# Patient Record
Sex: FEMALE | Race: WHITE | NOT HISPANIC OR LATINO | Employment: FULL TIME | URBAN - METROPOLITAN AREA
[De-identification: names, ages, dates, MRNs, and addresses within clinical notes are randomized per-mention and may not be internally consistent; named-entity substitution may affect disease eponyms.]

---

## 2021-07-08 LAB
ABO + RH BLD: NORMAL
C TRACH RRNA SPEC QL PROBE: NEGATIVE
HBV SURFACE AG SERPL QL IA: NEGATIVE
HIV-1 AND HIV-2 ANTIBODIES: NEGATIVE
INDIRECT COOMBS: NEGATIVE
N GONORRHOEAE, AMPLIFIED DNA: NEGATIVE
RPR: NORMAL
RUBELLA IMMUNE STATUS: NORMAL

## 2021-12-29 ENCOUNTER — HOSPITAL ENCOUNTER (OUTPATIENT)
Facility: HOSPITAL | Age: 26
Discharge: HOME | End: 2021-12-29
Attending: SPECIALIST | Admitting: SPECIALIST
Payer: COMMERCIAL

## 2021-12-29 VITALS
OXYGEN SATURATION: 100 % | HEIGHT: 71 IN | WEIGHT: 293 LBS | BODY MASS INDEX: 41.02 KG/M2 | RESPIRATION RATE: 18 BRPM | DIASTOLIC BLOOD PRESSURE: 64 MMHG | TEMPERATURE: 98 F | SYSTOLIC BLOOD PRESSURE: 119 MMHG | HEART RATE: 115 BPM

## 2021-12-29 DIAGNOSIS — M54.50 LOWER BACK PAIN: ICD-10-CM

## 2021-12-29 LAB
ALBUMIN SERPL BCP-MCNC: 3.1 G/DL (ref 3.5–5.2)
ALP SERPL-CCNC: 79 U/L (ref 55–135)
ALT SERPL W/O P-5'-P-CCNC: 10 U/L (ref 10–44)
AMPHET+METHAMPHET UR QL: NEGATIVE
ANION GAP SERPL CALC-SCNC: 9 MMOL/L (ref 8–16)
AST SERPL-CCNC: 15 U/L (ref 10–40)
BACTERIA #/AREA URNS HPF: ABNORMAL /HPF
BARBITURATES UR QL SCN>200 NG/ML: NEGATIVE
BASOPHILS # BLD AUTO: 0.01 K/UL (ref 0–0.2)
BASOPHILS NFR BLD: 0.2 % (ref 0–1.9)
BENZODIAZ UR QL SCN>200 NG/ML: NEGATIVE
BILIRUB SERPL-MCNC: 0.8 MG/DL (ref 0.1–1)
BILIRUB UR QL STRIP: NEGATIVE
BUN SERPL-MCNC: <5 MG/DL (ref 6–20)
BUPRENORPHINE UR QL: NEGATIVE
BZE UR QL SCN: NEGATIVE
CALCIUM SERPL-MCNC: 8.7 MG/DL (ref 8.7–10.5)
CANNABINOIDS UR QL SCN: NEGATIVE
CHLORIDE SERPL-SCNC: 104 MMOL/L (ref 95–110)
CLARITY UR: CLEAR
CO2 SERPL-SCNC: 21 MMOL/L (ref 23–29)
COLOR UR: YELLOW
CREAT SERPL-MCNC: 0.7 MG/DL (ref 0.5–1.4)
CREAT UR-MCNC: 201 MG/DL (ref 15–325)
DIFFERENTIAL METHOD: ABNORMAL
EOSINOPHIL # BLD AUTO: 0 K/UL (ref 0–0.5)
EOSINOPHIL NFR BLD: 0.2 % (ref 0–8)
ERYTHROCYTE [DISTWIDTH] IN BLOOD BY AUTOMATED COUNT: 13.3 % (ref 11.5–14.5)
EST. GFR  (AFRICAN AMERICAN): >60 ML/MIN/1.73 M^2
EST. GFR  (NON AFRICAN AMERICAN): >60 ML/MIN/1.73 M^2
GLUCOSE SERPL-MCNC: 85 MG/DL (ref 70–110)
GLUCOSE UR QL STRIP: NEGATIVE
HCT VFR BLD AUTO: 30.9 % (ref 37–48.5)
HGB BLD-MCNC: 10.1 G/DL (ref 12–16)
HGB UR QL STRIP: NEGATIVE
HYALINE CASTS #/AREA URNS LPF: 13 /LPF
IMM GRANULOCYTES # BLD AUTO: 0.04 K/UL (ref 0–0.04)
IMM GRANULOCYTES NFR BLD AUTO: 0.8 % (ref 0–0.5)
KETONES UR QL STRIP: 60
LEUKOCYTE ESTERASE UR QL STRIP: ABNORMAL
LYMPHOCYTES # BLD AUTO: 0.4 K/UL (ref 1–4.8)
LYMPHOCYTES NFR BLD: 8.4 % (ref 18–48)
MCH RBC QN AUTO: 28 PG (ref 27–31)
MCHC RBC AUTO-ENTMCNC: 32.7 G/DL (ref 32–36)
MCV RBC AUTO: 86 FL (ref 82–98)
MICROSCOPIC COMMENT: ABNORMAL
MONOCYTES # BLD AUTO: 0.4 K/UL (ref 0.3–1)
MONOCYTES NFR BLD: 7.8 % (ref 4–15)
NEUTROPHILS # BLD AUTO: 4 K/UL (ref 1.8–7.7)
NEUTROPHILS NFR BLD: 82.6 % (ref 38–73)
NITRITE UR QL STRIP: NEGATIVE
NRBC BLD-RTO: 0 /100 WBC
OPIATES UR QL SCN: NEGATIVE
PCP UR QL SCN>25 NG/ML: NEGATIVE
PH UR STRIP: 7 [PH] (ref 5–8)
PLATELET # BLD AUTO: 150 K/UL (ref 150–450)
PMV BLD AUTO: 11 FL (ref 9.2–12.9)
POTASSIUM SERPL-SCNC: 3.7 MMOL/L (ref 3.5–5.1)
PROT SERPL-MCNC: 6.5 G/DL (ref 6–8.4)
PROT UR QL STRIP: ABNORMAL
RBC # BLD AUTO: 3.61 M/UL (ref 4–5.4)
RBC #/AREA URNS HPF: 1 /HPF (ref 0–4)
SODIUM SERPL-SCNC: 134 MMOL/L (ref 136–145)
SP GR UR STRIP: 1.02 (ref 1–1.03)
SQUAMOUS #/AREA URNS HPF: 7 /HPF
TOXICOLOGY INFORMATION: NORMAL
URN SPEC COLLECT METH UR: ABNORMAL
UROBILINOGEN UR STRIP-ACNC: NEGATIVE EU/DL
WBC # BLD AUTO: 4.88 K/UL (ref 3.9–12.7)
WBC #/AREA URNS HPF: 8 /HPF (ref 0–5)

## 2021-12-29 PROCEDURE — 80053 COMPREHEN METABOLIC PANEL: CPT | Performed by: SPECIALIST

## 2021-12-29 PROCEDURE — 80307 DRUG TEST PRSMV CHEM ANLYZR: CPT | Performed by: SPECIALIST

## 2021-12-29 PROCEDURE — 99211 OFF/OP EST MAY X REQ PHY/QHP: CPT | Mod: 25

## 2021-12-29 PROCEDURE — 25000003 PHARM REV CODE 250: Performed by: SPECIALIST

## 2021-12-29 PROCEDURE — 81001 URINALYSIS AUTO W/SCOPE: CPT | Performed by: SPECIALIST

## 2021-12-29 PROCEDURE — 81003 URINALYSIS AUTO W/O SCOPE: CPT | Mod: 59 | Performed by: SPECIALIST

## 2021-12-29 PROCEDURE — 63600175 PHARM REV CODE 636 W HCPCS: Performed by: SPECIALIST

## 2021-12-29 PROCEDURE — 85025 COMPLETE CBC W/AUTO DIFF WBC: CPT | Performed by: SPECIALIST

## 2021-12-29 RX ORDER — SODIUM CHLORIDE, SODIUM LACTATE, POTASSIUM CHLORIDE, CALCIUM CHLORIDE 600; 310; 30; 20 MG/100ML; MG/100ML; MG/100ML; MG/100ML
INJECTION, SOLUTION INTRAVENOUS CONTINUOUS
Status: DISCONTINUED | OUTPATIENT
Start: 2021-12-29 | End: 2021-12-29 | Stop reason: HOSPADM

## 2021-12-29 RX ORDER — BUTORPHANOL TARTRATE 2 MG/ML
1 INJECTION INTRAMUSCULAR; INTRAVENOUS ONCE
Status: COMPLETED | OUTPATIENT
Start: 2021-12-29 | End: 2021-12-29

## 2021-12-29 RX ADMIN — PROMETHAZINE HYDROCHLORIDE 12.5 MG: 25 INJECTION INTRAMUSCULAR; INTRAVENOUS at 07:12

## 2021-12-29 RX ADMIN — BUTORPHANOL TARTRATE 1 MG: 2 INJECTION, SOLUTION INTRAMUSCULAR; INTRAVENOUS at 07:12

## 2021-12-29 RX ADMIN — SODIUM CHLORIDE, SODIUM LACTATE, POTASSIUM CHLORIDE, AND CALCIUM CHLORIDE 1000 ML: .6; .31; .03; .02 INJECTION, SOLUTION INTRAVENOUS at 07:12

## 2021-12-30 NOTE — NURSING
[facility]  Department of Obstetrics and Gynecology  Labor & Delivery Triage Assessment    PATIENT NAME: Rina Mckenzie  MRN: [id number]  TODAY'S DATE: 2021    CHIEF COMPLAINT: Contractions      OB History    Para Term  AB Living   2 1 0 0 0 0   SAB IAB Ectopic Multiple Live Births   0 0 0 0 0      # Outcome Date GA Lbr Polo/2nd Weight Sex Delivery Anes PTL Lv   2 Current            1 Para              Past Medical History:   Diagnosis Date    PCOS (polycystic ovarian syndrome)      Past Surgical History:   Procedure Laterality Date    CHOLECYSTECTOMY      gastric sleeve           VITAL SIGNS - ABNORMAL VITALS INCLUDE TEMP >100.4,RR <12 or >26, SUSTAINED MATERNAL PULSE <60 or >120     VITAL SIGNS (Most Recent)  Temp: 97.7 °F (36.5 °C) (21)  Pulse: (!) 115 (21)  Resp: 18 (21)  BP: 119/64 (21)  SpO2: 100 % (21)    VITAL SIGNS     normal  HEADACHE    no     VOMITING    no  VISUAL DISTURBANCES  no  EPIGASTRIC PAIN        no  PROTEINURIA 2+ or MORE             pending   EDEMA FACE/EXTREMITIES            no    FETAL MOVEMENT     FETAL MOVEMENT: Active  FETAL HEART RATE BASELINE = 145   normal  FETAL HEART RATE VARIABILITY:  Moderate  FETAL HEART RATE ACCELERATIONS FOR GESTATIONAL AGE: present  FETAL HEART RATE DECELERATIONS: none    ABDOMINAL PAIN/CRAMPING/CONTRACTIONS     Patient is not complaining of abdominal pain/cramping/contractions.    RUPTURE OF MEMBRANES OR LEAKING OF AMNIOTIC FLUID     Patient denies ROM or leaking of amniotic fluid.    VAGINAL BLEEDING     Patient denies vaginal bleeding.    VAGINAL EXAM       VAGINAL EXAM DEFERRED DUE TO:  Not in Labor    PAIN PRESENT ON ARRIVAL     ONSET:  Yesterday with increasing intensity today  LOCATION:  back  PAIN SCALE (0-10):  8  DESCRIPTION: Aching constant lower back with intermittent sharp pains in vagina    EXACERBATION OF CHRONIC CONDITION (i.e. DM, ASTHMA, HTN)      n/a    PATIENT DISPOSITION     Dr. Espino notified of above findings at 1858. report given to AMY Erazo RN  [facility]  12/29/2021

## 2021-12-30 NOTE — NURSING
[facility]  Department of Obstetrics and Gynecology  Labor & Delivery Triage Assessment    PATIENT NAME: Rina Mckenzie  MRN: [id number]  TODAY'S DATE: 2021    CHIEF COMPLAINT: Back Pain      OB History    Para Term  AB Living   2 1 0 0 0 0   SAB IAB Ectopic Multiple Live Births   0 0 0 0 0      # Outcome Date GA Lbr Polo/2nd Weight Sex Delivery Anes PTL Lv   2 Current            1 Para              Past Medical History:   Diagnosis Date    PCOS (polycystic ovarian syndrome)      Past Surgical History:   Procedure Laterality Date    CHOLECYSTECTOMY      gastric sleeve           VITAL SIGNS - ABNORMAL VITALS INCLUDE TEMP >100.4,RR <12 or >26, SUSTAINED MATERNAL PULSE <60 or >120     VITAL SIGNS (Most Recent)  Temp: 97.7 °F (36.5 °C) (21)  Pulse: (!) 115 (21)  Resp: 18 (21)  BP: 119/64 (21)  SpO2: 100 % (21)    VITAL SIGNS     normal  HEADACHE    no     VOMITING    no  VISUAL DISTURBANCES  no  EPIGASTRIC PAIN        no  PROTEINURIA 2+ or MORE             no   EDEMA FACE/EXTREMITIES            no    FETAL MOVEMENT     FETAL MOVEMENT: Active  FETAL HEART RATE BASELINE =  130  normal  FETAL HEART RATE VARIABILITY:  Moderate  FETAL HEART RATE ACCELERATIONS FOR GESTATIONAL AGE: present  FETAL HEART RATE DECELERATIONS: NA    ABDOMINAL PAIN/CRAMPING/CONTRACTIONS     Patient is not complaining of abdominal pain/cramping/contractions.    RUPTURE OF MEMBRANES OR LEAKING OF AMNIOTIC FLUID     Patient denies ROM or leaking of amniotic fluid.    VAGINAL BLEEDING     Patient denies vaginal bleeding.    VAGINAL EXAM     DILATION:  NA  STATION:  NA  EFFACEMENT:  NA  PRESENTATION:  NA    VAGINAL EXAM DEFERRED DUE TO:  Not in Labor    PAIN PRESENT ON ARRIVAL     ONSET:   Yesterday   LOCATION:  Lower back  And abd  PAIN SCALE (0-10):  8 and then 3 after pain med  DESCRIPTION: Constant     EXACERBATION OF CHRONIC CONDITION (i.e. DM, ASTHMA, HTN)      NA    PATIENT DISPOSITION     Discharged Home      Dr. Espino notified at 2123 of the above assessment.    Angelica Salinas RN  [facility]  12/29/2021

## 2022-01-18 LAB — PRENATAL STREP B CULTURE: POSITIVE

## 2022-01-21 ENCOUNTER — HOSPITAL ENCOUNTER (OUTPATIENT)
Facility: HOSPITAL | Age: 27
Discharge: HOME | End: 2022-01-21
Attending: OBSTETRICS & GYNECOLOGY | Admitting: OBSTETRICS & GYNECOLOGY
Payer: COMMERCIAL

## 2022-01-21 VITALS
BODY MASS INDEX: 41.02 KG/M2 | HEART RATE: 124 BPM | WEIGHT: 293 LBS | SYSTOLIC BLOOD PRESSURE: 137 MMHG | TEMPERATURE: 98 F | OXYGEN SATURATION: 99 % | HEIGHT: 71 IN | RESPIRATION RATE: 17 BRPM | DIASTOLIC BLOOD PRESSURE: 80 MMHG

## 2022-01-21 LAB
CTP QC/QA: YES
RUPTURE OF MEMBRANE: NEGATIVE

## 2022-01-21 PROCEDURE — 63600175 PHARM REV CODE 636 W HCPCS: Performed by: OBSTETRICS & GYNECOLOGY

## 2022-01-21 PROCEDURE — 59412 ANTEPARTUM MANIPULATION: CPT

## 2022-01-21 PROCEDURE — 96372 THER/PROPH/DIAG INJ SC/IM: CPT

## 2022-01-21 RX ORDER — TERBUTALINE SULFATE 1 MG/ML
0.25 INJECTION SUBCUTANEOUS ONCE
Status: COMPLETED | OUTPATIENT
Start: 2022-01-21 | End: 2022-01-21

## 2022-01-21 RX ADMIN — TERBUTALINE SULFATE 0.25 MG: 1 INJECTION, SOLUTION SUBCUTANEOUS at 08:01

## 2022-01-21 NOTE — DISCHARGE INSTRUCTIONS
Keep your scheduled appointment with your provider.    Call your Doctor if you have any of the following:  Temperature above 100 degrees  Nausea, vomiting and/or diarrhea  Severe headache, dizziness, or blurred vision  Notable increase in swelling of hands or feet  Notable swelling of face and lips  Difficulty, pain or burning with urination  Foul smelling vaginal discharge  Decreased fetal movement  Vaginal bleeding  Abdominal pain  Leakage of fluid    Come to the hospital if you have any of the following symptoms:  Your water breaks  More than 4-6 contractions in 1 hour for 2 or more hours  Vaginal bleeding like a period    After 28 weeks, you should feel 10 distinct fetal movements within a 2 hour period.    It is recommended that you drink 1/2 a gallon of water each day.  Tea, Soda and Juice are  in addition to this.

## 2022-01-21 NOTE — OP NOTE
Procedure - External cephalic version  Pre-op diagnosis - IUP at 36 wga, footling breech presentation  Post-op diagnosis - same  Surgeon - Becca Eastman MD  Assistant - Maryse Marquis MD    Procedure in detail - bedside ultrasound performed and baby was and footling breech presentation with the head in the maternal right upper quadrant.  Placenta was right lateral.  The patient had a reactive strip and was given a dose of terbutaline subQ.  Pressure was applied to the fetal head and buttocks to attempt to roll the baby backward.  Two attempts were made without success.  We then attempted to roll the baby forward without success.  The procedure was aborted.  The patient tolerated well.  She will be monitored for 4 hours after.  We discussed planning a primary  for 39 weeks, but if the baby flips before then she can have of vaginal delivery.

## 2022-01-21 NOTE — NURSING
0800 -Pt arrived for external version @ 0730. EFM applied and Saline lock placed.    0830 - Version attempted by Bakari Eastman and Kandis and unsuccessful.    1245 - Discharged home - No contractions, 0/10 pain scale FHR reactive. Discharge instructions given, which included to return for abdominal pain, leakage of fluid, vaginal bleeding, decrease fetal movement plus routine discharge instructions. Verbalized understanding

## 2022-02-09 ENCOUNTER — ANESTHESIA EVENT (OUTPATIENT)
Dept: OBSTETRICS AND GYNECOLOGY | Facility: HOSPITAL | Age: 27
End: 2022-02-09
Payer: COMMERCIAL

## 2022-02-10 ENCOUNTER — ANESTHESIA (OUTPATIENT)
Dept: OBSTETRICS AND GYNECOLOGY | Facility: HOSPITAL | Age: 27
End: 2022-02-10
Payer: COMMERCIAL

## 2022-02-10 ENCOUNTER — HOSPITAL ENCOUNTER (INPATIENT)
Facility: HOSPITAL | Age: 27
LOS: 2 days | Discharge: HOME | End: 2022-02-12
Attending: OBSTETRICS & GYNECOLOGY | Admitting: OBSTETRICS & GYNECOLOGY
Payer: COMMERCIAL

## 2022-02-10 LAB
ABO GROUP BLD: NORMAL
AMPHET+METHAMPHET UR QL: NEGATIVE
BACTERIA #/AREA URNS HPF: ABNORMAL /HPF
BARBITURATES UR QL SCN>200 NG/ML: NEGATIVE
BASOPHILS # BLD AUTO: 0.01 K/UL (ref 0–0.2)
BASOPHILS NFR BLD: 0.2 % (ref 0–1.9)
BENZODIAZ UR QL SCN>200 NG/ML: NEGATIVE
BILIRUB UR QL STRIP: NEGATIVE
BLD GP AB SCN CELLS X3 SERPL QL: NORMAL
BUN SERPL-MCNC: 5 MG/DL (ref 6–20)
BUPRENORPHINE UR QL: NEGATIVE
BZE UR QL SCN: NEGATIVE
CANNABINOIDS UR QL SCN: NEGATIVE
CLARITY UR: ABNORMAL
COLOR UR: YELLOW
CREAT SERPL-MCNC: 0.6 MG/DL (ref 0.5–1.4)
CREAT UR-MCNC: 467 MG/DL (ref 15–325)
DIFFERENTIAL METHOD: ABNORMAL
EOSINOPHIL # BLD AUTO: 0 K/UL (ref 0–0.5)
EOSINOPHIL NFR BLD: 0.2 % (ref 0–8)
ERYTHROCYTE [DISTWIDTH] IN BLOOD BY AUTOMATED COUNT: 14.2 % (ref 11.5–14.5)
ERYTHROCYTE [DISTWIDTH] IN BLOOD BY AUTOMATED COUNT: 14.5 % (ref 11.5–14.5)
EST. GFR  (AFRICAN AMERICAN): >60 ML/MIN/1.73 M^2
EST. GFR  (NON AFRICAN AMERICAN): >60 ML/MIN/1.73 M^2
GLUCOSE UR QL STRIP: NEGATIVE
HCT VFR BLD AUTO: 28.1 % (ref 37–48.5)
HCT VFR BLD AUTO: 32.5 % (ref 37–48.5)
HGB BLD-MCNC: 10.7 G/DL (ref 12–16)
HGB BLD-MCNC: 8.8 G/DL (ref 12–16)
HGB UR QL STRIP: NEGATIVE
HYALINE CASTS #/AREA URNS LPF: 4 /LPF
IMM GRANULOCYTES # BLD AUTO: 0.02 K/UL (ref 0–0.04)
IMM GRANULOCYTES NFR BLD AUTO: 0.3 % (ref 0–0.5)
KETONES UR QL STRIP: ABNORMAL
LEUKOCYTE ESTERASE UR QL STRIP: ABNORMAL
LYMPHOCYTES # BLD AUTO: 1.4 K/UL (ref 1–4.8)
LYMPHOCYTES NFR BLD: 22.4 % (ref 18–48)
MCH RBC QN AUTO: 27.1 PG (ref 27–31)
MCH RBC QN AUTO: 27.3 PG (ref 27–31)
MCHC RBC AUTO-ENTMCNC: 31.3 G/DL (ref 32–36)
MCHC RBC AUTO-ENTMCNC: 32.9 G/DL (ref 32–36)
MCV RBC AUTO: 83 FL (ref 82–98)
MCV RBC AUTO: 87 FL (ref 82–98)
MICROSCOPIC COMMENT: ABNORMAL
MONOCYTES # BLD AUTO: 0.4 K/UL (ref 0.3–1)
MONOCYTES NFR BLD: 6.6 % (ref 4–15)
NEUTROPHILS # BLD AUTO: 4.5 K/UL (ref 1.8–7.7)
NEUTROPHILS NFR BLD: 70.3 % (ref 38–73)
NITRITE UR QL STRIP: NEGATIVE
NRBC BLD-RTO: 0 /100 WBC
OPIATES UR QL SCN: NEGATIVE
OTHER ELEMENTS URNS MICRO: ABNORMAL
PCP UR QL SCN>25 NG/ML: NEGATIVE
PH UR STRIP: 6 [PH] (ref 5–8)
PLATELET # BLD AUTO: 158 K/UL (ref 150–450)
PLATELET # BLD AUTO: 207 K/UL (ref 150–450)
PMV BLD AUTO: 10.7 FL (ref 9.2–12.9)
PMV BLD AUTO: 10.8 FL (ref 9.2–12.9)
PROT UR QL STRIP: ABNORMAL
RBC # BLD AUTO: 3.25 M/UL (ref 4–5.4)
RBC # BLD AUTO: 3.92 M/UL (ref 4–5.4)
RBC #/AREA URNS HPF: 1 /HPF (ref 0–4)
RH BLD: NORMAL
RPR SER QL: NORMAL
SARS-COV-2 RDRP RESP QL NAA+PROBE: NEGATIVE
SP GR UR STRIP: 1.02 (ref 1–1.03)
SQUAMOUS #/AREA URNS HPF: 50 /HPF
TOXICOLOGY INFORMATION: ABNORMAL
URN SPEC COLLECT METH UR: ABNORMAL
UROBILINOGEN UR STRIP-ACNC: ABNORMAL EU/DL
WBC # BLD AUTO: 6.34 K/UL (ref 3.9–12.7)
WBC # BLD AUTO: 7.03 K/UL (ref 3.9–12.7)
WBC #/AREA URNS HPF: 60 /HPF (ref 0–5)

## 2022-02-10 PROCEDURE — 80307 DRUG TEST PRSMV CHEM ANLYZR: CPT | Performed by: OBSTETRICS & GYNECOLOGY

## 2022-02-10 PROCEDURE — 84520 ASSAY OF UREA NITROGEN: CPT | Performed by: OBSTETRICS & GYNECOLOGY

## 2022-02-10 PROCEDURE — 63600175 PHARM REV CODE 636 W HCPCS: Performed by: OBSTETRICS & GYNECOLOGY

## 2022-02-10 PROCEDURE — 37000009 HC ANESTHESIA EA ADD 15 MINS: Performed by: OBSTETRICS & GYNECOLOGY

## 2022-02-10 PROCEDURE — U0002 COVID-19 LAB TEST NON-CDC: HCPCS | Performed by: OBSTETRICS & GYNECOLOGY

## 2022-02-10 PROCEDURE — 25000003 PHARM REV CODE 250: Performed by: OBSTETRICS & GYNECOLOGY

## 2022-02-10 PROCEDURE — 63600175 PHARM REV CODE 636 W HCPCS: Performed by: ANESTHESIOLOGY

## 2022-02-10 PROCEDURE — 63600175 PHARM REV CODE 636 W HCPCS: Performed by: NURSE ANESTHETIST, CERTIFIED REGISTERED

## 2022-02-10 PROCEDURE — 86850 RBC ANTIBODY SCREEN: CPT | Performed by: OBSTETRICS & GYNECOLOGY

## 2022-02-10 PROCEDURE — 12000002 HC ACUTE/MED SURGE SEMI-PRIVATE ROOM

## 2022-02-10 PROCEDURE — 86592 SYPHILIS TEST NON-TREP QUAL: CPT | Performed by: OBSTETRICS & GYNECOLOGY

## 2022-02-10 PROCEDURE — 25000003 PHARM REV CODE 250: Performed by: ANESTHESIOLOGY

## 2022-02-10 PROCEDURE — 71000039 HC RECOVERY, EACH ADD'L HOUR: Performed by: OBSTETRICS & GYNECOLOGY

## 2022-02-10 PROCEDURE — 37000008 HC ANESTHESIA 1ST 15 MINUTES: Performed by: OBSTETRICS & GYNECOLOGY

## 2022-02-10 PROCEDURE — 36415 COLL VENOUS BLD VENIPUNCTURE: CPT | Performed by: OBSTETRICS & GYNECOLOGY

## 2022-02-10 PROCEDURE — 85025 COMPLETE CBC W/AUTO DIFF WBC: CPT | Performed by: OBSTETRICS & GYNECOLOGY

## 2022-02-10 PROCEDURE — 85027 COMPLETE CBC AUTOMATED: CPT | Performed by: OBSTETRICS & GYNECOLOGY

## 2022-02-10 PROCEDURE — 71000033 HC RECOVERY, INTIAL HOUR: Performed by: OBSTETRICS & GYNECOLOGY

## 2022-02-10 PROCEDURE — 81001 URINALYSIS AUTO W/SCOPE: CPT | Performed by: OBSTETRICS & GYNECOLOGY

## 2022-02-10 PROCEDURE — 86901 BLOOD TYPING SEROLOGIC RH(D): CPT | Performed by: OBSTETRICS & GYNECOLOGY

## 2022-02-10 PROCEDURE — 86900 BLOOD TYPING SEROLOGIC ABO: CPT | Performed by: OBSTETRICS & GYNECOLOGY

## 2022-02-10 PROCEDURE — 87086 URINE CULTURE/COLONY COUNT: CPT | Performed by: OBSTETRICS & GYNECOLOGY

## 2022-02-10 PROCEDURE — 36000685 HC CESAREAN SECTION LEVEL I

## 2022-02-10 PROCEDURE — 82565 ASSAY OF CREATININE: CPT | Performed by: OBSTETRICS & GYNECOLOGY

## 2022-02-10 RX ORDER — ONDANSETRON 2 MG/ML
INJECTION INTRAMUSCULAR; INTRAVENOUS
Status: DISCONTINUED | OUTPATIENT
Start: 2022-02-10 | End: 2022-02-10

## 2022-02-10 RX ORDER — MUPIROCIN 20 MG/G
OINTMENT TOPICAL 2 TIMES DAILY
Status: DISCONTINUED | OUTPATIENT
Start: 2022-02-10 | End: 2022-02-12 | Stop reason: HOSPADM

## 2022-02-10 RX ORDER — PRENATAL WITH FERROUS FUM AND FOLIC ACID 3080; 920; 120; 400; 22; 1.84; 3; 20; 10; 1; 12; 200; 27; 25; 2 [IU]/1; [IU]/1; MG/1; [IU]/1; MG/1; MG/1; MG/1; MG/1; MG/1; MG/1; UG/1; MG/1; MG/1; MG/1; MG/1
1 TABLET ORAL DAILY
Status: DISCONTINUED | OUTPATIENT
Start: 2022-02-11 | End: 2022-02-12 | Stop reason: HOSPADM

## 2022-02-10 RX ORDER — BUPIVACAINE HYDROCHLORIDE 5 MG/ML
24 INJECTION, SOLUTION EPIDURAL; INTRACAUDAL
Status: DISCONTINUED | OUTPATIENT
Start: 2022-02-10 | End: 2022-02-12 | Stop reason: HOSPADM

## 2022-02-10 RX ORDER — OXYTOCIN-SODIUM CHLORIDE 0.9% IV SOLN 30 UNIT/500ML 30-0.9/5 UT/ML-%
30 SOLUTION INTRAVENOUS ONCE
Status: DISCONTINUED | OUTPATIENT
Start: 2022-02-10 | End: 2022-02-12 | Stop reason: HOSPADM

## 2022-02-10 RX ORDER — PHENYLEPHRINE HYDROCHLORIDE 10 MG/ML
INJECTION INTRAVENOUS
Status: DISCONTINUED | OUTPATIENT
Start: 2022-02-10 | End: 2022-02-10

## 2022-02-10 RX ORDER — OXYCODONE HYDROCHLORIDE 5 MG/1
5 TABLET ORAL EVERY 4 HOURS PRN
Status: DISCONTINUED | OUTPATIENT
Start: 2022-02-10 | End: 2022-02-11

## 2022-02-10 RX ORDER — ADHESIVE BANDAGE
30 BANDAGE TOPICAL 2 TIMES DAILY PRN
Status: DISCONTINUED | OUTPATIENT
Start: 2022-02-11 | End: 2022-02-12 | Stop reason: HOSPADM

## 2022-02-10 RX ORDER — SODIUM CHLORIDE, SODIUM LACTATE, POTASSIUM CHLORIDE, CALCIUM CHLORIDE 600; 310; 30; 20 MG/100ML; MG/100ML; MG/100ML; MG/100ML
INJECTION, SOLUTION INTRAVENOUS CONTINUOUS
Status: DISCONTINUED | OUTPATIENT
Start: 2022-02-10 | End: 2022-02-12 | Stop reason: HOSPADM

## 2022-02-10 RX ORDER — ACETAMINOPHEN 10 MG/ML
INJECTION, SOLUTION INTRAVENOUS
Status: DISCONTINUED | OUTPATIENT
Start: 2022-02-10 | End: 2022-02-10

## 2022-02-10 RX ORDER — SIMETHICONE 80 MG
1 TABLET,CHEWABLE ORAL EVERY 6 HOURS PRN
Status: DISCONTINUED | OUTPATIENT
Start: 2022-02-10 | End: 2022-02-12 | Stop reason: HOSPADM

## 2022-02-10 RX ORDER — BISACODYL 10 MG
10 SUPPOSITORY, RECTAL RECTAL ONCE AS NEEDED
Status: DISCONTINUED | OUTPATIENT
Start: 2022-02-10 | End: 2022-02-12 | Stop reason: HOSPADM

## 2022-02-10 RX ORDER — PROCHLORPERAZINE EDISYLATE 5 MG/ML
5 INJECTION INTRAMUSCULAR; INTRAVENOUS EVERY 6 HOURS PRN
Status: DISCONTINUED | OUTPATIENT
Start: 2022-02-10 | End: 2022-02-12 | Stop reason: HOSPADM

## 2022-02-10 RX ORDER — SODIUM CHLORIDE, SODIUM LACTATE, POTASSIUM CHLORIDE, CALCIUM CHLORIDE 600; 310; 30; 20 MG/100ML; MG/100ML; MG/100ML; MG/100ML
INJECTION, SOLUTION INTRAVENOUS CONTINUOUS PRN
Status: DISCONTINUED | OUTPATIENT
Start: 2022-02-10 | End: 2022-02-10

## 2022-02-10 RX ORDER — HYDROMORPHONE HYDROCHLORIDE 1 MG/ML
1 INJECTION, SOLUTION INTRAMUSCULAR; INTRAVENOUS; SUBCUTANEOUS
Status: DISCONTINUED | OUTPATIENT
Start: 2022-02-10 | End: 2022-02-12 | Stop reason: HOSPADM

## 2022-02-10 RX ORDER — DIPHENHYDRAMINE HYDROCHLORIDE 50 MG/ML
25 INJECTION INTRAMUSCULAR; INTRAVENOUS EVERY 4 HOURS PRN
Status: DISCONTINUED | OUTPATIENT
Start: 2022-02-10 | End: 2022-02-12 | Stop reason: HOSPADM

## 2022-02-10 RX ORDER — DIPHENHYDRAMINE HCL 25 MG
25 CAPSULE ORAL EVERY 4 HOURS PRN
Status: DISCONTINUED | OUTPATIENT
Start: 2022-02-10 | End: 2022-02-12 | Stop reason: HOSPADM

## 2022-02-10 RX ORDER — DOCUSATE SODIUM 100 MG/1
200 CAPSULE, LIQUID FILLED ORAL 2 TIMES DAILY
Status: DISCONTINUED | OUTPATIENT
Start: 2022-02-10 | End: 2022-02-12 | Stop reason: HOSPADM

## 2022-02-10 RX ORDER — OXYCODONE HYDROCHLORIDE 5 MG/1
10 TABLET ORAL EVERY 4 HOURS PRN
Status: DISCONTINUED | OUTPATIENT
Start: 2022-02-10 | End: 2022-02-11

## 2022-02-10 RX ORDER — FENTANYL CITRATE 50 UG/ML
INJECTION, SOLUTION INTRAMUSCULAR; INTRAVENOUS
Status: DISCONTINUED | OUTPATIENT
Start: 2022-02-10 | End: 2022-02-10

## 2022-02-10 RX ORDER — NALBUPHINE HYDROCHLORIDE 10 MG/ML
5 INJECTION, SOLUTION INTRAMUSCULAR; INTRAVENOUS; SUBCUTANEOUS EVERY 4 HOURS PRN
Status: DISCONTINUED | OUTPATIENT
Start: 2022-02-10 | End: 2022-02-12 | Stop reason: HOSPADM

## 2022-02-10 RX ORDER — KETOROLAC TROMETHAMINE 30 MG/ML
30 INJECTION, SOLUTION INTRAMUSCULAR; INTRAVENOUS EVERY 6 HOURS
Status: COMPLETED | OUTPATIENT
Start: 2022-02-11 | End: 2022-02-11

## 2022-02-10 RX ORDER — ONDANSETRON 2 MG/ML
4 INJECTION INTRAMUSCULAR; INTRAVENOUS EVERY 6 HOURS PRN
Status: ACTIVE | OUTPATIENT
Start: 2022-02-10 | End: 2022-02-12

## 2022-02-10 RX ORDER — HYDROMORPHONE HYDROCHLORIDE 1 MG/ML
2 INJECTION, SOLUTION INTRAMUSCULAR; INTRAVENOUS; SUBCUTANEOUS
Status: DISCONTINUED | OUTPATIENT
Start: 2022-02-10 | End: 2022-02-10

## 2022-02-10 RX ORDER — OXYTOCIN-SODIUM CHLORIDE 0.9% IV SOLN 30 UNIT/500ML 30-0.9/5 UT/ML-%
SOLUTION INTRAVENOUS CONTINUOUS PRN
Status: DISCONTINUED | OUTPATIENT
Start: 2022-02-10 | End: 2022-02-10

## 2022-02-10 RX ORDER — KETOROLAC TROMETHAMINE 30 MG/ML
30 INJECTION, SOLUTION INTRAMUSCULAR; INTRAVENOUS EVERY 6 HOURS
Status: DISCONTINUED | OUTPATIENT
Start: 2022-02-11 | End: 2022-02-10

## 2022-02-10 RX ORDER — HYDROCORTISONE 25 MG/G
CREAM TOPICAL 3 TIMES DAILY PRN
Status: DISCONTINUED | OUTPATIENT
Start: 2022-02-10 | End: 2022-02-12 | Stop reason: HOSPADM

## 2022-02-10 RX ORDER — MORPHINE SULFATE 0.5 MG/ML
INJECTION, SOLUTION EPIDURAL; INTRATHECAL; INTRAVENOUS
Status: DISCONTINUED | OUTPATIENT
Start: 2022-02-10 | End: 2022-02-10

## 2022-02-10 RX ORDER — PROCHLORPERAZINE EDISYLATE 5 MG/ML
5 INJECTION INTRAMUSCULAR; INTRAVENOUS EVERY 6 HOURS PRN
Status: DISCONTINUED | OUTPATIENT
Start: 2022-02-10 | End: 2022-02-10

## 2022-02-10 RX ORDER — KETOROLAC TROMETHAMINE 30 MG/ML
30 INJECTION, SOLUTION INTRAMUSCULAR; INTRAVENOUS ONCE
Status: COMPLETED | OUTPATIENT
Start: 2022-02-10 | End: 2022-02-10

## 2022-02-10 RX ADMIN — SODIUM CHLORIDE, SODIUM LACTATE, POTASSIUM CHLORIDE, AND CALCIUM CHLORIDE: .6; .31; .03; .02 INJECTION, SOLUTION INTRAVENOUS at 12:02

## 2022-02-10 RX ADMIN — KETOROLAC TROMETHAMINE 30 MG: 30 INJECTION, SOLUTION INTRAMUSCULAR at 06:02

## 2022-02-10 RX ADMIN — MUPIROCIN: 20 OINTMENT TOPICAL at 09:02

## 2022-02-10 RX ADMIN — MORPHINE SULFATE 2.5 MG: 0.5 INJECTION, SOLUTION EPIDURAL; INTRATHECAL; INTRAVENOUS at 12:02

## 2022-02-10 RX ADMIN — ONDANSETRON 4 MG: 2 INJECTION INTRAMUSCULAR; INTRAVENOUS at 04:02

## 2022-02-10 RX ADMIN — PROCHLORPERAZINE EDISYLATE 5 MG: 5 INJECTION INTRAMUSCULAR; INTRAVENOUS at 06:02

## 2022-02-10 RX ADMIN — PHENYLEPHRINE HYDROCHLORIDE 100 MCG: 10 INJECTION INTRAVENOUS at 12:02

## 2022-02-10 RX ADMIN — HYDROMORPHONE HYDROCHLORIDE 1 MG: 1 INJECTION, SOLUTION INTRAMUSCULAR; INTRAVENOUS; SUBCUTANEOUS at 06:02

## 2022-02-10 RX ADMIN — CEFAZOLIN SODIUM 3 ML: 2 SOLUTION INTRAVENOUS at 12:02

## 2022-02-10 RX ADMIN — DIPHENHYDRAMINE HYDROCHLORIDE 25 MG: 25 CAPSULE ORAL at 09:02

## 2022-02-10 RX ADMIN — ONDANSETRON 4 MG: 2 INJECTION INTRAMUSCULAR; INTRAVENOUS at 12:02

## 2022-02-10 RX ADMIN — SODIUM CHLORIDE, SODIUM LACTATE, POTASSIUM CHLORIDE, AND CALCIUM CHLORIDE 1000 ML: .6; .31; .03; .02 INJECTION, SOLUTION INTRAVENOUS at 10:02

## 2022-02-10 RX ADMIN — DOCUSATE SODIUM 200 MG: 100 CAPSULE, LIQUID FILLED ORAL at 09:02

## 2022-02-10 RX ADMIN — ACETAMINOPHEN 1000 MG: 10 INJECTION, SOLUTION INTRAVENOUS at 12:02

## 2022-02-10 RX ADMIN — FENTANYL CITRATE 100 MCG: 50 INJECTION INTRAMUSCULAR; INTRAVENOUS at 12:02

## 2022-02-10 RX ADMIN — HYDROMORPHONE HYDROCHLORIDE 2 MG: 1 INJECTION, SOLUTION INTRAMUSCULAR; INTRAVENOUS; SUBCUTANEOUS at 03:02

## 2022-02-10 RX ADMIN — OXYCODONE HYDROCHLORIDE 10 MG: 5 TABLET ORAL at 03:02

## 2022-02-10 RX ADMIN — FENTANYL CITRATE 100 MCG: 50 INJECTION INTRAMUSCULAR; INTRAVENOUS at 01:02

## 2022-02-10 RX ADMIN — Medication 3500 ML/HR: at 12:02

## 2022-02-10 NOTE — L&D DELIVERY NOTE
[facility]   Section   Operative Note    SUMMARY     Date of Procedure: 2/10/2022     Procedure: primary low transverse  section    Surgeon(s) and Role:     * Becca Eastman MD - Primary     * Yanira Flores MD - Assisting        Pre-Operative Diagnosis: IUP at 39 wga, kelsey breech presentation    Post-Operative Diagnosis: same    Anesthesia: spinal           Description of the Findings of the Procedure: VMI, kelsey breech presentation, 8#0oz, apgars 8/9.  Grossly normal uterus, fallopian tubes, and ovaries      Complications: No    QBL - 406ml     Procedure in detail - The risks, benefits, indication, and alternatives of the procedure were discussed with the patient and informed consent was obtained.  She was taken to the operating room where spinal anesthesia was found to be adequate.She was prepped and draped in normal sterile fashion in the supine position with a Sotelo catheter in place. A Pfannenstiel skin incision was made with a scalpel and carried through to the underlying layer fascia with the bovie. The fascia was incised in the midline and this incision was extended laterally using the curved Olguin scissors. The superior aspect of the fascial incision was grasped with Leslie clamps and underlying rectus muscles were dissected off sharply using curved Olguin scissors and bluntly. The inferior aspect of the fascial incision was treated in a similar manner. The rectus muscles were  in the midline and the peritoneum was identified and entered bluntly. The peritoneal incision was extended superiorly and inferiorly with good visualization of bladder. The bladder blade was inserted and vesicouterine peritoneum was incised in the midline. This incision was extended laterally and the bladder flap created digitally. The lower uterine segment was incised in the midline and this incision was extended laterally digitally also. The membranes were ruptured upon entry. The bladder  blade was removed and the infant's buttocks and trunk delivered, arms were swept medially and delivered easily.  Head delivered in flexed position without difficulty.   The cord was clamped and cut and the infant was handed off to waiting nursery staff. The placenta was delivered and the uterus was exteriorized and cleared of all clots and debris. The uterine incision was repaired with 0 PDS in a running locked fashion in two layers.  Several zero and 2- O chromic sutures were placed along the incision and excellent hemostasis was seen. The uterus was returned the pelvis and the pelvis was irrigated copiously with warm normal saline. The uterine incision and lower uterine segment were again checked for hemostasis and all instruments and sponges were removed from the abdomen and pelvis. The peritoneum was closed using 2-0 Vicryl in running fashion. The rectus muscles were checked for hemostasis. The fascia was closed using 0 PDS in a running fashion. The subcuticular fat was irrigated with normal saline and hemostasis was achieved using the Bovie. Willard's fascia was reapproximated using 2-0 Vicryl in a running fashion. A solution of exparel and marcaine was injected sub q per unit protocol.  The skin was closed using 4-0 Vicryl in a subcuticular fashion and a Mepilex dressing was placed over the incision. The patient tolerated procedure well. Sponge lap needle and instrument counts were correct ×2. She was taken recovery in stable condition.    Specimens:   Specimen (24h ago, onward)            None          Condition: Good    Disposition: L&D    Attestation: Good         Delivery Information for Roc Mckenzie    Birth information:  YOB: [birth year]22   Time of birth: 12:56 PM   Sex: male   Head Delivery Date/Time:     Delivery type:    Gestational Age: 39w2d    Delivery Providers    Delivering clinician:            Measurements    Weight:   Length:          Apgars    Living status:   Apgars:  1 min.:  5  min.:  10 min.:  15 min.:  20 min.:    Skin color:         Heart rate:         Reflex irritability:         Muscle tone:         Respiratory effort:         Total:                                Interventions/Resuscitation         Cord    No data filed       Placenta    Placenta delivery date/time:   Placenta removal:            Labor Events:       labor:       Labor Onset Date/Time:         Dilation Complete Date/Time:         Start Pushing Date/Time:         Start Pushing Date/Time:       Rupture Date/Time:            Rupture type:          Fluid Amount:       Fluid Color:                steroids:       Antibiotics given for GBS:       Induction:       Indications for induction:        Augmentation:       Indications for augmentation:       Labor complications:       Additional complications:          Cervical ripening:                     Delivery:      Episiotomy:       Indication for Episiotomy:       Perineal Lacerations:   Repaired:      Periurethral Laceration:   Repaired:     Labial Laceration:   Repaired:     Sulcus Laceration:   Repaired:     Vaginal Laceration:   Repaired:     Cervical Laceration:   Repaired:     Repair suture:       Repair # of packets:       Last Value - EBL - Nursing (mL):       Sum - EBL - Nursing (mL): 0     Last Value - EBL - Anesthesia (mL):      Calculated QBL (mL):       Vaginal Sweep Performed:       Surgicount Correct:         Other providers:            Details (if applicable):  Trial of Labor      Categorization:      Priority:     Indications for :     Incision Type:       Additional  information:  Forceps:    Vacuum:    Breech:    Observed anomalies    Other (Comments):

## 2022-02-10 NOTE — ANESTHESIA PREPROCEDURE EVALUATION
02/10/2022  Rina Mckenzie is a 26 y.o., female.    Anesthesia Evaluation    I have reviewed the Patient Summary Reports.    I have reviewed the Nursing Notes. I have reviewed the NPO Status.   I have reviewed the Medications.     Review of Systems  Anesthesia Hx:  Patient reports painful placement with 1st epidural.  Patient also reports slow to wake with general anesthesia. Denies Family Hx of Anesthesia complications.   Denies Personal Hx of Anesthesia complications.   Social:  Non-Smoker, No Alcohol Use    Hematology/Oncology:  Hematology Normal   Oncology Normal     EENT/Dental:   Lower permanent retainer   Cardiovascular:  Cardiovascular Normal  History of palpitations due to anxiety   Pulmonary:  Pulmonary Normal    Renal/:  Renal/ Normal     Hepatic/GI:   GERD History of vertical sleeve gastrectomy   Musculoskeletal:   Occasional low back pain and pelvic clicking with pregnancy.  Occasional TMJ popping.   Neurological:   Headaches (Occasional)    Endocrine:  Endocrine Normal    Psych:   anxiety depression          Physical Exam  General:  Morbid Obesity    Airway/Jaw/Neck:  Airway Findings: Mouth Opening: Normal Tongue: Normal  Mallampati: III  Improves to II with phonation.  TM Distance: Normal, at least 6 cm  Jaw/Neck Findings:  Neck ROM: Normal ROM      Dental:  Dental Findings: In tact   Chest/Lungs:  Chest/Lungs Findings: Clear to auscultation, Normal Respiratory Rate     Heart/Vascular:  Heart Findings: Rate: Normal  Rhythm: Regular Rhythm  Sounds: Normal  Heart murmur: negative       Mental Status:  Mental Status Findings:  Cooperative, Alert and Oriented         Anesthesia Plan  Type of Anesthesia, risks & benefits discussed:  Anesthesia Type:  CSE    Patient's Preference:   Plan Factors:          Intra-op Monitoring Plan: standard ASA monitors  Intra-op Monitoring Plan Comments:   Post  Op Pain Control Plan: multimodal analgesia and IV/PO Opioids PRN  Post Op Pain Control Plan Comments:     Induction:    Beta Blocker:         Informed Consent: Patient understands risks and agrees with Anesthesia plan.  Questions answered. Anesthesia consent signed with patient.  ASA Score: 3     Day of Surgery Review of History & Physical:        Anesthesia Plan Notes: Multimodal analgesia: CSE, Duramorph, Ofirmev        Ready For Surgery From Anesthesia Perspective.

## 2022-02-10 NOTE — TRANSFER OF CARE
"Anesthesia Transfer of Care Note    Patient: Rina Mckenzie    Procedure(s) Performed: Procedure(s) (LRB):   SECTION  (PRIMARY)   (BREECH) (N/A)    Patient location: Labor and Delivery    Anesthesia Type: CSE    Transport from OR: Transported from OR on room air with adequate spontaneous ventilation    Post pain: adequate analgesia    Post assessment: no apparent anesthetic complications    Post vital signs: stable    Level of consciousness: awake, alert and oriented    Nausea/Vomiting: no nausea/vomiting    Complications: none    Transfer of care protocol was followed      Last vitals:   Visit Vitals  /62 (BP Location: Right arm, Patient Position: Lying)   Pulse 73   Temp 36 °C (96.8 °F) (Axillary)   Resp 16   Ht 5' 11" (1.803 m)   Wt (!) 140.6 kg (310 lb)   SpO2 100%   Breastfeeding No   BMI 43.24 kg/m²     "

## 2022-02-10 NOTE — LACTATION NOTE
This note was copied from a baby's chart.  Mom reports baby is breastfeeding well & had just finished breastfeed baby. Breastfeeding basics reviewed. Discussed feeding cues & feeding frequency 8 or more times in 24 hours. Instructed to never to delay or limit feedings. Encouraged lots of skin to skin with baby. Assistance offered prn. Mom verbalized understanding

## 2022-02-11 PROCEDURE — 12000002 HC ACUTE/MED SURGE SEMI-PRIVATE ROOM

## 2022-02-11 PROCEDURE — 63600175 PHARM REV CODE 636 W HCPCS: Performed by: STUDENT IN AN ORGANIZED HEALTH CARE EDUCATION/TRAINING PROGRAM

## 2022-02-11 PROCEDURE — 25000003 PHARM REV CODE 250: Performed by: ANESTHESIOLOGY

## 2022-02-11 PROCEDURE — 25000003 PHARM REV CODE 250: Performed by: OBSTETRICS & GYNECOLOGY

## 2022-02-11 RX ORDER — OXYCODONE AND ACETAMINOPHEN 5; 325 MG/1; MG/1
1 TABLET ORAL EVERY 4 HOURS PRN
Status: DISCONTINUED | OUTPATIENT
Start: 2022-02-11 | End: 2022-02-12 | Stop reason: HOSPADM

## 2022-02-11 RX ORDER — OXYCODONE AND ACETAMINOPHEN 10; 325 MG/1; MG/1
1 TABLET ORAL EVERY 4 HOURS PRN
Status: DISCONTINUED | OUTPATIENT
Start: 2022-02-11 | End: 2022-02-12 | Stop reason: HOSPADM

## 2022-02-11 RX ADMIN — OXYCODONE HYDROCHLORIDE AND ACETAMINOPHEN 1 TABLET: 10; 325 TABLET ORAL at 01:02

## 2022-02-11 RX ADMIN — Medication: at 08:02

## 2022-02-11 RX ADMIN — MUPIROCIN: 20 OINTMENT TOPICAL at 08:02

## 2022-02-11 RX ADMIN — DIPHENHYDRAMINE HYDROCHLORIDE 25 MG: 25 CAPSULE ORAL at 01:02

## 2022-02-11 RX ADMIN — KETOROLAC TROMETHAMINE 30 MG: 30 INJECTION, SOLUTION INTRAMUSCULAR at 05:02

## 2022-02-11 RX ADMIN — OXYCODONE HYDROCHLORIDE 5 MG: 5 TABLET ORAL at 02:02

## 2022-02-11 RX ADMIN — MUPIROCIN: 20 OINTMENT TOPICAL at 09:02

## 2022-02-11 RX ADMIN — DOCUSATE SODIUM 200 MG: 100 CAPSULE, LIQUID FILLED ORAL at 09:02

## 2022-02-11 RX ADMIN — OXYCODONE HYDROCHLORIDE 5 MG: 5 TABLET ORAL at 05:02

## 2022-02-11 RX ADMIN — KETOROLAC TROMETHAMINE 30 MG: 30 INJECTION, SOLUTION INTRAMUSCULAR at 12:02

## 2022-02-11 RX ADMIN — OXYCODONE HYDROCHLORIDE AND ACETAMINOPHEN 1 TABLET: 10; 325 TABLET ORAL at 06:02

## 2022-02-11 RX ADMIN — DOCUSATE SODIUM 200 MG: 100 CAPSULE, LIQUID FILLED ORAL at 08:02

## 2022-02-11 RX ADMIN — OXYCODONE HYDROCHLORIDE AND ACETAMINOPHEN 1 TABLET: 10; 325 TABLET ORAL at 09:02

## 2022-02-11 RX ADMIN — OXYCODONE HYDROCHLORIDE 10 MG: 5 TABLET ORAL at 09:02

## 2022-02-11 RX ADMIN — PRENATAL VIT W/ FE FUMARATE-FA TAB 27-0.8 MG 1 TABLET: 27-0.8 TAB at 08:02

## 2022-02-11 NOTE — SUBJECTIVE & OBJECTIVE
Interval History: s/p primary c/section POD #1    She is doing well this morning. She is tolerating a regular diet without nausea or vomiting. She is voiding spontaneously. She is ambulating. She has passed flatus, and has not a BM. Vaginal bleeding is mild. She denies fever or chills. Abdominal pain is mild and controlled with oral medications. She Is breastfeeding. She desires circumcision for her male baby: yes.    Objective:     Vital Signs (Most Recent):  Temp: 98 °F (36.7 °C) (02/11/22 1110)  Pulse: 89 (02/11/22 1110)  Resp: 19 (02/11/22 1359)  BP: 135/78 (02/11/22 1110)  SpO2: 97 % (02/11/22 1110) Vital Signs (24h Range):  Temp:  [97.7 °F (36.5 °C)-98.5 °F (36.9 °C)] 98 °F (36.7 °C)  Pulse:  [66-94] 89  Resp:  [15-19] 19  SpO2:  [96 %-99 %] 97 %  BP: (106-136)/(58-85) 135/78     Weight: (!) 140.6 kg (310 lb)  Body mass index is 43.24 kg/m².      Intake/Output Summary (Last 24 hours) at 2/11/2022 1508  Last data filed at 2/11/2022 1359  Gross per 24 hour   Intake --   Output 3256 ml   Net -3256 ml         Significant Labs:  Lab Results   Component Value Date    GROUPTRH AB POS 07/08/2021    HEPBSAG Negative 07/08/2021    STREPBCULT positive 01/18/2022     Recent Labs   Lab 02/10/22  1946   HGB 8.8*   HCT 28.1*       I have personallly reviewed all pertinent lab results from the last 24 hours.    Physical Exam   Gen - NAD  Uterus - firm below umbilicus, non tender  Ext - trace edema bilateral lower extremity, no calf tenderness  Incision - mepilex in place, dry

## 2022-02-11 NOTE — PLAN OF CARE
Rina is progressing well. V.S. WNL. Pain controlled with PO medication. Ambulates around room and hallway with ease. Fundus firm, lochia light. Denies needs or concerns.

## 2022-02-11 NOTE — ANESTHESIA POSTPROCEDURE EVALUATION
Anesthesia Post Evaluation    Patient: Rina Mckenzie    Procedure(s) Performed: Procedure(s) (LRB):   SECTION  (PRIMARY)   (BREECH) (N/A)    Final Anesthesia Type: CSE      Patient location during evaluation: labor & delivery  Patient participation: Yes- Able to Participate  Level of consciousness: awake and alert and oriented  Post-procedure vital signs: reviewed and stable  Pain management: adequate  Airway patency: patent    PONV status at discharge: No PONV  Anesthetic complications: no      Cardiovascular status: blood pressure returned to baseline and hemodynamically stable  Respiratory status: spontaneous ventilation, unassisted and room air  Hydration status: euvolemic  Follow-up not needed.  Comments: Patient reports full return of motor and sensory to her lower extremities. No headache or backache. Ambulating without difficulty. No apparent anesthetic complications.          Vitals Value Taken Time   /78 22 0834   Temp 36.5 °C (97.7 °F) 22 0834   Pulse 79 22 0834   Resp 17 22 0955   SpO2 99 % 22 0834         No case tracking events are documented in the log.      Pain/Shlomo Score: Pain Rating Prior to Med Admin: 7 (2022  9:55 AM)  Pain Rating Post Med Admin: 3 (2/10/2022  6:45 PM)

## 2022-02-11 NOTE — PROGRESS NOTES
[facility]  Obstetrics  Postpartum Progress Note    Patient Name: Rina Mckenzie  MRN: [id number]  Admission Date: 2/10/2022  Hospital Length of Stay: 1 days  Attending Physician: Becca Eastman MD  Primary Care Provider: Primary Doctor No    Subjective:     Principal Problem:Breech presentation    Hospital Course:  No notes on file    Interval History: s/p primary c/section POD #1    She is doing well this morning. She is tolerating a regular diet without nausea or vomiting. She is voiding spontaneously. She is ambulating. She has passed flatus, and has not a BM. Vaginal bleeding is mild. She denies fever or chills. Abdominal pain is mild and controlled with oral medications. She Is breastfeeding. She desires circumcision for her male baby: yes.    Objective:     Vital Signs (Most Recent):  Temp: 98 °F (36.7 °C) (22 1110)  Pulse: 89 (22 1110)  Resp: 19 (22 1359)  BP: 135/78 (22 1110)  SpO2: 97 % (22 1110) Vital Signs (24h Range):  Temp:  [97.7 °F (36.5 °C)-98.5 °F (36.9 °C)] 98 °F (36.7 °C)  Pulse:  [66-94] 89  Resp:  [15-19] 19  SpO2:  [96 %-99 %] 97 %  BP: (106-136)/(58-85) 135/78     Weight: (!) 140.6 kg (310 lb)  Body mass index is 43.24 kg/m².      Intake/Output Summary (Last 24 hours) at 2022 1508  Last data filed at 2022 1359  Gross per 24 hour   Intake --   Output 3256 ml   Net -3256 ml         Significant Labs:  Lab Results   Component Value Date    GROUPTRH AB POS 2021    HEPBSAG Negative 2021    STREPBCULT positive 2022     Recent Labs   Lab 02/10/22  1946   HGB 8.8*   HCT 28.1*       I have personallly reviewed all pertinent lab results from the last 24 hours.    Physical Exam   Gen - NAD  Uterus - firm below umbilicus, non tender  Ext - trace edema bilateral lower extremity, no calf tenderness  Incision - mepilex in place, dry    Assessment/Plan:     26 y.o. female  for:    * Breech presentation  S/p primary c/section  POD #1, doing well    Continue post op care        .    Becca Eastman MD  Obstetrics  [facility]

## 2022-02-11 NOTE — NURSING
Dr. Maddox contacted by RN to request Toradol order to replace scheduled Ibuprofen order. Patient states that she has had the gastric sleeve procedure and can't take oral Ibuprofen. Order received for Toradol 30mg x2 doses (0000, 0600).

## 2022-02-11 NOTE — LACTATION NOTE
02/11/22 1010   Maternal Assessment   Breast Density Bilateral:;soft   Areola Bilateral:;elastic   Nipples Bilateral:;everted   Maternal Infant Feeding   Maternal Emotional State independent   Infant Positioning cradle   Signs of Milk Transfer audible swallow   Pain with Feeding no   Latch Assistance no     Patient breastfeeding baby on right breast in cradle position. Baby latched deeply, nursing well with audible swallows. Mother denies pain during feeding. Reviewed basic breastfeeding instructions and encouraged to call me for any further breastfeeding assistance. Patient verbalizes understanding of all instructions with good recall.

## 2022-02-11 NOTE — PROGRESS NOTES
OB Screen completed and no needs identified at this time.  White board in room updated with contact information, and mother was encouraged to contact office if further needs arise.     22 1040   OB SCREEN   Assessment Type Discharge Planning Assessment   Source of Information patient;health record   Received Prenatal Care Yes   Any indications/suspicions for None   Is this a teen pregnancy No   Is the baby in NICU No   Indication for adoption/Safe Haven No   Indication for DME/post-acute needs No   HIV (+) No   Any congenital  disorders No   Fetal demise/ death No

## 2022-02-12 VITALS
DIASTOLIC BLOOD PRESSURE: 84 MMHG | OXYGEN SATURATION: 98 % | TEMPERATURE: 99 F | HEART RATE: 80 BPM | HEIGHT: 71 IN | RESPIRATION RATE: 20 BRPM | BODY MASS INDEX: 41.02 KG/M2 | SYSTOLIC BLOOD PRESSURE: 125 MMHG | WEIGHT: 293 LBS

## 2022-02-12 LAB
BACTERIA UR CULT: NO GROWTH
BASOPHILS # BLD AUTO: 0.01 K/UL (ref 0–0.2)
BASOPHILS NFR BLD: 0.2 % (ref 0–1.9)
DIFFERENTIAL METHOD: ABNORMAL
EOSINOPHIL # BLD AUTO: 0.1 K/UL (ref 0–0.5)
EOSINOPHIL NFR BLD: 1 % (ref 0–8)
ERYTHROCYTE [DISTWIDTH] IN BLOOD BY AUTOMATED COUNT: 14.6 % (ref 11.5–14.5)
HCT VFR BLD AUTO: 28.4 % (ref 37–48.5)
HGB BLD-MCNC: 8.9 G/DL (ref 12–16)
IMM GRANULOCYTES # BLD AUTO: 0.02 K/UL (ref 0–0.04)
IMM GRANULOCYTES NFR BLD AUTO: 0.3 % (ref 0–0.5)
LYMPHOCYTES # BLD AUTO: 1.3 K/UL (ref 1–4.8)
LYMPHOCYTES NFR BLD: 22.8 % (ref 18–48)
MCH RBC QN AUTO: 27.1 PG (ref 27–31)
MCHC RBC AUTO-ENTMCNC: 31.3 G/DL (ref 32–36)
MCV RBC AUTO: 86 FL (ref 82–98)
MONOCYTES # BLD AUTO: 0.4 K/UL (ref 0.3–1)
MONOCYTES NFR BLD: 7.1 % (ref 4–15)
NEUTROPHILS # BLD AUTO: 4 K/UL (ref 1.8–7.7)
NEUTROPHILS NFR BLD: 68.6 % (ref 38–73)
NRBC BLD-RTO: 0 /100 WBC
PLATELET # BLD AUTO: 180 K/UL (ref 150–450)
PMV BLD AUTO: 10.8 FL (ref 9.2–12.9)
RBC # BLD AUTO: 3.29 M/UL (ref 4–5.4)
WBC # BLD AUTO: 5.78 K/UL (ref 3.9–12.7)

## 2022-02-12 PROCEDURE — 25000003 PHARM REV CODE 250: Performed by: OBSTETRICS & GYNECOLOGY

## 2022-02-12 PROCEDURE — 36415 COLL VENOUS BLD VENIPUNCTURE: CPT | Performed by: OBSTETRICS & GYNECOLOGY

## 2022-02-12 PROCEDURE — 85025 COMPLETE CBC W/AUTO DIFF WBC: CPT | Performed by: OBSTETRICS & GYNECOLOGY

## 2022-02-12 RX ORDER — OXYCODONE AND ACETAMINOPHEN 5; 325 MG/1; MG/1
1-2 TABLET ORAL EVERY 4 HOURS PRN
Qty: 30 TABLET | Refills: 0 | Status: SHIPPED | OUTPATIENT
Start: 2022-02-12 | End: 2022-11-03

## 2022-02-12 RX ADMIN — OXYCODONE HYDROCHLORIDE AND ACETAMINOPHEN 1 TABLET: 10; 325 TABLET ORAL at 03:02

## 2022-02-12 RX ADMIN — OXYCODONE HYDROCHLORIDE AND ACETAMINOPHEN 1 TABLET: 10; 325 TABLET ORAL at 08:02

## 2022-02-12 RX ADMIN — PRENATAL VIT W/ FE FUMARATE-FA TAB 27-0.8 MG 1 TABLET: 27-0.8 TAB at 08:02

## 2022-02-12 RX ADMIN — DOCUSATE SODIUM 200 MG: 100 CAPSULE, LIQUID FILLED ORAL at 08:02

## 2022-02-12 RX ADMIN — OXYCODONE HYDROCHLORIDE AND ACETAMINOPHEN 1 TABLET: 10; 325 TABLET ORAL at 12:02

## 2022-02-12 NOTE — DISCHARGE INSTRUCTIONS
Pelvic rest for 6 weeks (no sex, tampons, douching, nothing in the vagina)    You can experience vaginal bleeding on and off for up to 6 weeks, it will gradually get lighter and the color will change from bright red to a brownish discharge towards the end.    Activity:  NO strenuous activities or exercising for 6 weeks.  Do not /lift anything over 15 pounds and no heavy housework or cleaning for 6 weeks.  Limit stair climbing to twice a day during the first 2 weeks.   NO driving for 4 weeks.  You may take short car trips but do not drive.    You may shower ONLY for the first 2 weeks, after 2 weeks you can soak in a bathtub.  Use a mild soap, no heavy perfumes or fragrances to avoid irritation.     Walking frequently following a  delivery promotes healing and decreases pain associated with gas.   If constipation develops:  You may take Colace (stool softener), Milk of Magnesia, Dulcolax or Miralax.  All of these medications are sold over the counter.      Incision Care:   Clean your incision with mild soap & warm water only- do not scrub- let warm water run over it, then pat dry.      Pain Relief:  You may take Motrin for mild pain & uterine cramping.      Emotional Changes:  You may experience baby blues after delivery.  You may feel let down, anxious and cry easily.  This is normal.  These feelings can begin 2-3 days after delivery and usually disappear in about a week or two.  Prolonged sadness may indicate postpartum depression.      Call your doctor for any of the following:  Difficulty breathing, problems with any of your medications, inability to eat.    Foul smelling vaginal discharge.  If you notice pus-like drainage from your incision, if your incision or the area around it becomes hot or swollen, or if you notice a foul smelling odor.  Temperature above 100.4.    Heavy vaginal bleeding.  All women bleed different after delivery and each delivery is different.  Heavy bleeding consists of  saturating a shamir pad in a 1 hour time period.  Passing clots are normal, if you pass a blood clot larger than the size of a golf ball call your doctor's office.   If you experience pain in your legs/calves, if one leg increases in size and becomes swollen or becomes hot to touch or discolored.   Crying or periods of sadness beyond 2 weeks.      If you are breast feeding:  Wash your breasts with mild soap and warm water.  You should wear a supportive bra.  You should continue to take a prenatal vitamin for 6 weeks or until breastfeeding is discontinued.  If nipples are sore, apply a few drops of breast milk after nursing and let air dry or you can use Lanolin cream.   If breasts are engorged, apply warmth and express milk.    If you are not breastfeeding:  Wear a tight bra and do not stimulate your breasts.  Avoid handling your breasts and do not express milk.  You may apply ice packs or cabbage leaves to relieve discomfort from engorgement.  If your breasts become warm to touch, reddened or lumps develop call your doctor.

## 2022-02-12 NOTE — DISCHARGE SUMMARY
[facility]  Discharge Summary  Obstetrics - Triage      Admit Date: 2/10/2022    Discharge Date and Time:  2022 11:22 AM    Attending Physician: Becca Eastman MD     Discharge Provider: Becca Eastman    Reason for Admission: IUP at 39 wga, breech presentation    Hospital Course (synopsis of major diagnoses, care, treatment, and services provided during the course of the hospital stay):     Rina Mckenzie is a 26 y.o.  female who presented to labor and delivery for primary  at 39 weeks secondary to breech presentation.  She had a  section without complication and her postop course was unremarkable.  On postop day 2. She was tolerating a regular diet, passing gas, her pain was well controlled with oral medication, her vaginal bleeding was minimal, and she requested discharge home.  Throughout her stay her vital signs are stable and she was afebrile.  She will be discharged home with discharge instructions, a prescription for pain medicine, and she will follow up with me next week.  We discussed the use of ibuprofen since she has a history of a gastric sleeve.  The patient was uncertain whether she can take it or not.  We discussed taking it with caution with food and no more than 400 mg at a time.    Gen - NAD  Uterus - firm below umbilicus, non tender  Ext - trace edema bilateral lower extremity, no calf tenderness  Incision - mepilex in place, dry.    She was admitted to the labor and delivery triage area. Vital signs on admit were: Temp: 98.1 °F (36.7 °C), Pulse: 98, Resp: 17, BP: 118/77, SpO2: 97 %.      Final Diagnoses:    Principal Problem: Breech presentation   Secondary Diagnoses: none    Discharged Condition: good    Disposition: Home or Self Care    Follow Up/Patient Instructions:     Medications:  Reconciled Home Medications:      Medication List      START taking these medications    oxyCODONE-acetaminophen 5-325 mg per tablet  Commonly known as:  PERCOCET  Take 1-2 tablets by mouth every 4 (four) hours as needed for Pain.        CONTINUE taking these medications    PRENATAL 1+1 ORAL  Take by mouth.          Discharge Procedure Orders   Diet Adult Regular     Lifting restrictions   Scheduling Instructions: No lifting greater than 10 # for 6 weeks     No driving until:   Order Comments: No driving for 2 weeks      Follow-up Information     Becca Eastman MD. Schedule an appointment as soon as possible for a visit in 1 week.    Specialties: Obstetrics, Obstetrics and Gynecology  Why: For follow up  Contact information:  [address]  [address]  [facility]  [place] [address]  [phone]

## 2022-02-28 ENCOUNTER — OFFICE VISIT (OUTPATIENT)
Dept: URGENT CARE | Facility: CLINIC | Age: 27
End: 2022-02-28
Payer: MEDICAID

## 2022-02-28 VITALS
RESPIRATION RATE: 16 BRPM | OXYGEN SATURATION: 99 % | HEIGHT: 71 IN | DIASTOLIC BLOOD PRESSURE: 85 MMHG | HEART RATE: 82 BPM | TEMPERATURE: 97 F | WEIGHT: 283 LBS | BODY MASS INDEX: 39.62 KG/M2 | SYSTOLIC BLOOD PRESSURE: 137 MMHG

## 2022-02-28 DIAGNOSIS — B37.2 CANDIDIASIS OF SKIN: Primary | ICD-10-CM

## 2022-02-28 PROCEDURE — 1159F PR MEDICATION LIST DOCUMENTED IN MEDICAL RECORD: ICD-10-PCS | Mod: CPTII,S$GLB,, | Performed by: NURSE PRACTITIONER

## 2022-02-28 PROCEDURE — 3075F SYST BP GE 130 - 139MM HG: CPT | Mod: CPTII,S$GLB,, | Performed by: NURSE PRACTITIONER

## 2022-02-28 PROCEDURE — 3079F DIAST BP 80-89 MM HG: CPT | Mod: CPTII,S$GLB,, | Performed by: NURSE PRACTITIONER

## 2022-02-28 PROCEDURE — 3075F PR MOST RECENT SYSTOLIC BLOOD PRESS GE 130-139MM HG: ICD-10-PCS | Mod: CPTII,S$GLB,, | Performed by: NURSE PRACTITIONER

## 2022-02-28 PROCEDURE — 99203 OFFICE O/P NEW LOW 30 MIN: CPT | Mod: S$GLB,,, | Performed by: NURSE PRACTITIONER

## 2022-02-28 PROCEDURE — 1159F MED LIST DOCD IN RCRD: CPT | Mod: CPTII,S$GLB,, | Performed by: NURSE PRACTITIONER

## 2022-02-28 PROCEDURE — 1160F RVW MEDS BY RX/DR IN RCRD: CPT | Mod: CPTII,S$GLB,, | Performed by: NURSE PRACTITIONER

## 2022-02-28 PROCEDURE — 99203 PR OFFICE/OUTPT VISIT, NEW, LEVL III, 30-44 MIN: ICD-10-PCS | Mod: S$GLB,,, | Performed by: NURSE PRACTITIONER

## 2022-02-28 PROCEDURE — 3079F PR MOST RECENT DIASTOLIC BLOOD PRESSURE 80-89 MM HG: ICD-10-PCS | Mod: CPTII,S$GLB,, | Performed by: NURSE PRACTITIONER

## 2022-02-28 PROCEDURE — 1160F PR REVIEW ALL MEDS BY PRESCRIBER/CLIN PHARMACIST DOCUMENTED: ICD-10-PCS | Mod: CPTII,S$GLB,, | Performed by: NURSE PRACTITIONER

## 2022-02-28 PROCEDURE — 3008F PR BODY MASS INDEX (BMI) DOCUMENTED: ICD-10-PCS | Mod: CPTII,S$GLB,, | Performed by: NURSE PRACTITIONER

## 2022-02-28 PROCEDURE — 3008F BODY MASS INDEX DOCD: CPT | Mod: CPTII,S$GLB,, | Performed by: NURSE PRACTITIONER

## 2022-02-28 RX ORDER — FLUCONAZOLE 150 MG/1
150 TABLET ORAL EVERY OTHER DAY
Qty: 5 TABLET | Refills: 0 | Status: SHIPPED | OUTPATIENT
Start: 2022-02-28 | End: 2022-03-09

## 2022-02-28 RX ORDER — NYSTATIN 100000 [USP'U]/G
POWDER TOPICAL 2 TIMES DAILY
Qty: 60 G | Refills: 0 | Status: SHIPPED | OUTPATIENT
Start: 2022-02-28 | End: 2023-02-08

## 2022-03-01 NOTE — PATIENT INSTRUCTIONS
Nystatin powder to affected area twice daily  Keep area clean and dry  Fluconazole 150mg every other day x 5  Follow up if symptoms persist or worsen

## 2022-03-01 NOTE — PROGRESS NOTES
"Subjective:       Patient ID: Rina Mckenzie is a 26 y.o. female.    Vitals:  height is 5' 11" (1.803 m) and weight is 128.4 kg (283 lb). Her oral temperature is 97 °F (36.1 °C). Her blood pressure is 137/85 and her pulse is 82. Her respiration is 16 and oxygen saturation is 99%.     Chief Complaint: Wound Infection    Pt had a  on 02/10/2022.  States she believes her incision site is infected.   Incision site with redness and foul odor.  OB is Dr Becca Eastman. She is breastfeeding.       Constitution: Negative for fever.   Skin: Positive for rash.       Objective:      Physical Exam   Constitutional: She is oriented to person, place, and time. She appears well-developed.   HENT:   Head: Normocephalic and atraumatic.   Mouth/Throat: Oropharynx is clear and moist.   Eyes: Conjunctivae and EOM are normal. Pupils are equal, round, and reactive to light.   Neck: Neck supple.   Cardiovascular: Normal rate, regular rhythm and normal heart sounds.   Pulmonary/Chest: Effort normal and breath sounds normal.   Musculoskeletal: Normal range of motion.         General: Normal range of motion.   Neurological: She is alert and oriented to person, place, and time.   Skin: Skin is warm, dry, rash and papular (Lower abdomen with papular red rash). Capillary refill takes 2 to 3 seconds.         Comments:  incision well approximated and healing well. No drainage noted from incision.   Psychiatric: Her behavior is normal.   Nursing note and vitals reviewed.        Assessment:       1. Candidiasis of skin          Plan:         Candidiasis of skin    Other orders  -     fluconazole (DIFLUCAN) 150 MG Tab; Take 1 tablet (150 mg total) by mouth every other day. for 5 doses  Dispense: 5 tablet; Refill: 0  -     nystatin (MYCOSTATIN) powder; Apply topically 2 (two) times daily.  Dispense: 60 g; Refill: 0        Nystatin powder to affected area twice daily  Keep area clean and dry  Fluconazole 150mg every other day x " 5  Follow up with Dr Eastman  Follow up if symptoms persist or worsen

## 2022-03-03 ENCOUNTER — PATIENT MESSAGE (OUTPATIENT)
Dept: URGENT CARE | Facility: CLINIC | Age: 27
End: 2022-03-03
Payer: MEDICAID

## 2022-11-02 ENCOUNTER — HOSPITAL ENCOUNTER (EMERGENCY)
Facility: HOSPITAL | Age: 27
Discharge: HOME | End: 2022-11-02
Attending: EMERGENCY MEDICINE
Payer: COMMERCIAL

## 2022-11-02 VITALS
RESPIRATION RATE: 16 BRPM | TEMPERATURE: 98 F | HEART RATE: 74 BPM | DIASTOLIC BLOOD PRESSURE: 55 MMHG | OXYGEN SATURATION: 98 % | BODY MASS INDEX: 39.2 KG/M2 | HEIGHT: 71 IN | WEIGHT: 280 LBS | SYSTOLIC BLOOD PRESSURE: 104 MMHG

## 2022-11-02 DIAGNOSIS — N83.209 HEMORRHAGIC OVARIAN CYST: Primary | ICD-10-CM

## 2022-11-02 LAB
ALBUMIN SERPL BCP-MCNC: 4 G/DL (ref 3.5–5.2)
ALP SERPL-CCNC: 69 U/L (ref 55–135)
ALT SERPL W/O P-5'-P-CCNC: 16 U/L (ref 10–44)
ANION GAP SERPL CALC-SCNC: 8 MMOL/L (ref 8–16)
AST SERPL-CCNC: 13 U/L (ref 10–40)
B-HCG UR QL: NEGATIVE
BACTERIA #/AREA URNS HPF: NEGATIVE /HPF
BASOPHILS # BLD AUTO: 0.01 K/UL (ref 0–0.2)
BASOPHILS NFR BLD: 0.1 % (ref 0–1.9)
BILIRUB SERPL-MCNC: 1 MG/DL (ref 0.1–1)
BILIRUB UR QL STRIP: NEGATIVE
BUN SERPL-MCNC: 8 MG/DL (ref 6–20)
CALCIUM SERPL-MCNC: 9.3 MG/DL (ref 8.7–10.5)
CHLORIDE SERPL-SCNC: 108 MMOL/L (ref 95–110)
CLARITY UR: ABNORMAL
CO2 SERPL-SCNC: 24 MMOL/L (ref 23–29)
COLOR UR: YELLOW
CREAT SERPL-MCNC: 0.7 MG/DL (ref 0.5–1.4)
CTP QC/QA: YES
DIFFERENTIAL METHOD: ABNORMAL
EOSINOPHIL # BLD AUTO: 0.1 K/UL (ref 0–0.5)
EOSINOPHIL NFR BLD: 0.7 % (ref 0–8)
ERYTHROCYTE [DISTWIDTH] IN BLOOD BY AUTOMATED COUNT: 14.3 % (ref 11.5–14.5)
EST. GFR  (NO RACE VARIABLE): >60 ML/MIN/1.73 M^2
GLUCOSE SERPL-MCNC: 97 MG/DL (ref 70–110)
GLUCOSE UR QL STRIP: NEGATIVE
HCT VFR BLD AUTO: 38.9 % (ref 37–48.5)
HGB BLD-MCNC: 12.3 G/DL (ref 12–16)
HGB UR QL STRIP: NEGATIVE
HYALINE CASTS #/AREA URNS LPF: 8 /LPF
IMM GRANULOCYTES # BLD AUTO: 0.02 K/UL (ref 0–0.04)
IMM GRANULOCYTES NFR BLD AUTO: 0.3 % (ref 0–0.5)
KETONES UR QL STRIP: NEGATIVE
LEUKOCYTE ESTERASE UR QL STRIP: ABNORMAL
LIPASE SERPL-CCNC: 36 U/L (ref 4–60)
LYMPHOCYTES # BLD AUTO: 1.4 K/UL (ref 1–4.8)
LYMPHOCYTES NFR BLD: 18 % (ref 18–48)
MCH RBC QN AUTO: 26.5 PG (ref 27–31)
MCHC RBC AUTO-ENTMCNC: 31.6 G/DL (ref 32–36)
MCV RBC AUTO: 84 FL (ref 82–98)
MICROSCOPIC COMMENT: ABNORMAL
MONOCYTES # BLD AUTO: 0.4 K/UL (ref 0.3–1)
MONOCYTES NFR BLD: 5.5 % (ref 4–15)
NEUTROPHILS # BLD AUTO: 5.8 K/UL (ref 1.8–7.7)
NEUTROPHILS NFR BLD: 75.4 % (ref 38–73)
NITRITE UR QL STRIP: NEGATIVE
NRBC BLD-RTO: 0 /100 WBC
PH UR STRIP: 7 [PH] (ref 5–8)
PLATELET # BLD AUTO: 238 K/UL (ref 150–450)
PMV BLD AUTO: 11.2 FL (ref 9.2–12.9)
POTASSIUM SERPL-SCNC: 4.2 MMOL/L (ref 3.5–5.1)
PROT SERPL-MCNC: 7.1 G/DL (ref 6–8.4)
PROT UR QL STRIP: ABNORMAL
RBC # BLD AUTO: 4.64 M/UL (ref 4–5.4)
RBC #/AREA URNS HPF: 1 /HPF (ref 0–4)
SODIUM SERPL-SCNC: 140 MMOL/L (ref 136–145)
SP GR UR STRIP: 1.02 (ref 1–1.03)
SQUAMOUS #/AREA URNS HPF: 19 /HPF
URN SPEC COLLECT METH UR: ABNORMAL
UROBILINOGEN UR STRIP-ACNC: NEGATIVE EU/DL
WBC # BLD AUTO: 7.63 K/UL (ref 3.9–12.7)
WBC #/AREA URNS HPF: 8 /HPF (ref 0–5)

## 2022-11-02 PROCEDURE — 25000003 PHARM REV CODE 250: Performed by: EMERGENCY MEDICINE

## 2022-11-02 PROCEDURE — 96376 TX/PRO/DX INJ SAME DRUG ADON: CPT

## 2022-11-02 PROCEDURE — 85025 COMPLETE CBC W/AUTO DIFF WBC: CPT | Performed by: EMERGENCY MEDICINE

## 2022-11-02 PROCEDURE — 96375 TX/PRO/DX INJ NEW DRUG ADDON: CPT

## 2022-11-02 PROCEDURE — 81001 URINALYSIS AUTO W/SCOPE: CPT | Performed by: EMERGENCY MEDICINE

## 2022-11-02 PROCEDURE — 96374 THER/PROPH/DIAG INJ IV PUSH: CPT

## 2022-11-02 PROCEDURE — 81003 URINALYSIS AUTO W/O SCOPE: CPT | Performed by: EMERGENCY MEDICINE

## 2022-11-02 PROCEDURE — 81025 URINE PREGNANCY TEST: CPT | Performed by: EMERGENCY MEDICINE

## 2022-11-02 PROCEDURE — 25500020 PHARM REV CODE 255: Performed by: EMERGENCY MEDICINE

## 2022-11-02 PROCEDURE — 63600175 PHARM REV CODE 636 W HCPCS: Performed by: EMERGENCY MEDICINE

## 2022-11-02 PROCEDURE — 83690 ASSAY OF LIPASE: CPT | Performed by: EMERGENCY MEDICINE

## 2022-11-02 PROCEDURE — 96361 HYDRATE IV INFUSION ADD-ON: CPT

## 2022-11-02 PROCEDURE — 99285 EMERGENCY DEPT VISIT HI MDM: CPT | Mod: 25

## 2022-11-02 PROCEDURE — 80053 COMPREHEN METABOLIC PANEL: CPT | Performed by: EMERGENCY MEDICINE

## 2022-11-02 RX ORDER — HYDROMORPHONE HYDROCHLORIDE 1 MG/ML
1 INJECTION, SOLUTION INTRAMUSCULAR; INTRAVENOUS; SUBCUTANEOUS
Status: COMPLETED | OUTPATIENT
Start: 2022-11-02 | End: 2022-11-02

## 2022-11-02 RX ORDER — HYDROCODONE BITARTRATE AND ACETAMINOPHEN 5; 325 MG/1; MG/1
1 TABLET ORAL EVERY 6 HOURS PRN
Qty: 12 TABLET | Refills: 0 | Status: SHIPPED | OUTPATIENT
Start: 2022-11-02 | End: 2022-11-03

## 2022-11-02 RX ORDER — ONDANSETRON 2 MG/ML
4 INJECTION INTRAMUSCULAR; INTRAVENOUS
Status: COMPLETED | OUTPATIENT
Start: 2022-11-02 | End: 2022-11-02

## 2022-11-02 RX ORDER — DICLOFENAC SODIUM 75 MG/1
75 TABLET, DELAYED RELEASE ORAL 2 TIMES DAILY
Qty: 14 TABLET | Refills: 0 | Status: SHIPPED | OUTPATIENT
Start: 2022-11-02 | End: 2023-02-08

## 2022-11-02 RX ORDER — KETOROLAC TROMETHAMINE 30 MG/ML
15 INJECTION, SOLUTION INTRAMUSCULAR; INTRAVENOUS
Status: COMPLETED | OUTPATIENT
Start: 2022-11-02 | End: 2022-11-02

## 2022-11-02 RX ORDER — MORPHINE SULFATE 4 MG/ML
4 INJECTION, SOLUTION INTRAMUSCULAR; INTRAVENOUS
Status: COMPLETED | OUTPATIENT
Start: 2022-11-02 | End: 2022-11-02

## 2022-11-02 RX ADMIN — HYDROMORPHONE HYDROCHLORIDE 1 MG: 1 INJECTION, SOLUTION INTRAMUSCULAR; INTRAVENOUS; SUBCUTANEOUS at 01:11

## 2022-11-02 RX ADMIN — KETOROLAC TROMETHAMINE 15 MG: 30 INJECTION, SOLUTION INTRAMUSCULAR at 01:11

## 2022-11-02 RX ADMIN — MORPHINE SULFATE 4 MG: 4 INJECTION, SOLUTION INTRAMUSCULAR; INTRAVENOUS at 10:11

## 2022-11-02 RX ADMIN — ONDANSETRON 4 MG: 2 INJECTION INTRAMUSCULAR; INTRAVENOUS at 08:11

## 2022-11-02 RX ADMIN — SODIUM CHLORIDE 1000 ML: 0.9 INJECTION, SOLUTION INTRAVENOUS at 08:11

## 2022-11-02 RX ADMIN — SODIUM CHLORIDE 1000 ML: 0.9 INJECTION, SOLUTION INTRAVENOUS at 01:11

## 2022-11-02 RX ADMIN — IOHEXOL 100 ML: 350 INJECTION, SOLUTION INTRAVENOUS at 09:11

## 2022-11-02 RX ADMIN — MORPHINE SULFATE 4 MG: 4 INJECTION, SOLUTION INTRAMUSCULAR; INTRAVENOUS at 08:11

## 2022-11-02 NOTE — ED PROVIDER NOTES
Encounter Date: 2022       History     Chief Complaint   Patient presents with    Abdominal Pain     Patient presents emergency department reported abdominal pain began in the periumbilical area not radiating over to the right lower quadrant symptoms began yesterday worsened somewhat she reports nausea decreased p.o. intake she denies any vomiting no diarrhea is noted she denies any blood in her stool last bowel movement was yesterday was normal she denies any urinary symptoms patient does report a history of cholecystectomy and gastric sleeve in the past she also reports that she has polycystic ovarian syndrome and may have ovarian cyst at this time she reports her last ultrasound was wall she was pregnant and delivered her child February patient denies any sick contacts at home mother reports that she is a       Review of patient's allergies indicates:  No Known Allergies  Past Medical History:   Diagnosis Date    PCOS (polycystic ovarian syndrome)      Past Surgical History:   Procedure Laterality Date     SECTION N/A 02/10/2022    Procedure:  SECTION  (PRIMARY)   (BREECH);  Surgeon: Becca Eastman MD;  [facility];  Service: OB/GYN;  Laterality: N/A;     SECTION      CHOLECYSTECTOMY      gastric sleeve       No family history on file.  Social History     Tobacco Use    Smoking status: Never    Smokeless tobacco: Never   Substance Use Topics    Alcohol use: Not Currently    Drug use: Never     Review of Systems   Constitutional:  Negative for chills, fatigue and fever.   HENT:  Positive for congestion.    Respiratory:  Positive for cough.    Gastrointestinal:  Positive for abdominal pain and nausea. Negative for blood in stool, diarrhea and vomiting.   Genitourinary:  Negative for dysuria, frequency and hematuria.   All other systems reviewed and are negative.    Physical Exam     Initial Vitals [22 0638]   BP Pulse Resp Temp SpO2   126/80 97 16 97.2  °F (36.2 °C) 98 %      MAP       --         Physical Exam    Constitutional: She appears well-developed and well-nourished. No distress.   HENT:   Head: Normocephalic and atraumatic.   Right Ear: External ear normal.   Left Ear: External ear normal.   Mouth/Throat: Oropharynx is clear and moist.   Eyes: Conjunctivae and EOM are normal. Pupils are equal, round, and reactive to light.   Neck: Neck supple.   Normal range of motion.  Cardiovascular:  Normal rate, regular rhythm, normal heart sounds and intact distal pulses.           Pulmonary/Chest: Breath sounds normal. No respiratory distress.   Abdominal: Abdomen is soft. Bowel sounds are normal. There is abdominal tenderness in the right lower quadrant, periumbilical area and suprapubic area.   Musculoskeletal:         General: No edema. Normal range of motion.      Cervical back: Normal range of motion and neck supple.     Neurological: She is alert and oriented to person, place, and time. GCS score is 15. GCS eye subscore is 4. GCS verbal subscore is 5. GCS motor subscore is 6.   Skin: Skin is warm and dry. Capillary refill takes less than 2 seconds. No rash noted.   Psychiatric: She has a normal mood and affect. Her behavior is normal.       ED Course   Procedures  Labs Reviewed   CBC W/ AUTO DIFFERENTIAL - Abnormal; Notable for the following components:       Result Value    MCH 26.5 (*)     MCHC 31.6 (*)     Gran % 75.4 (*)     All other components within normal limits   URINALYSIS, REFLEX TO URINE CULTURE - Abnormal; Notable for the following components:    Appearance, UA Hazy (*)     Protein, UA Trace (*)     Leukocytes, UA 1+ (*)     All other components within normal limits    Narrative:     Specimen Source->Urine   URINALYSIS MICROSCOPIC - Abnormal; Notable for the following components:    WBC, UA 8 (*)     Hyaline Casts, UA 8 (*)     All other components within normal limits    Narrative:     Specimen Source->Urine   COMPREHENSIVE METABOLIC PANEL    LIPASE   POCT URINE PREGNANCY          Imaging Results              US Pelvis Comp with Transvag NON-OB (xpd (Final result)  Result time 11/02/22 11:38:49      Final result by Jeanna Espinoza MD (11/02/22 11:38:49)                   Narrative:    Pelvic ultrasound    Clinical history is right lower quadrant pain    Transabdominal and transvaginal pelvic ultrasound was performed. The uterus measures 11.3 x 4.5 x 7.0 cm.  The right ovary measures 5.2 x 2.9 x 2.7 cm. There is a 2 cm complex right ovarian cyst. There is normal flow to the right ovary.  The left ovary measures 3.0 x 1.9 x 1.9 cm with normal flow.    Transvaginally there are small nabothian cysts. Endometrium measures 13 mm. There is minimal fluid in the cul-de-sac.    IMPRESSION: Minimal free fluid in the cul-de-sac    2 cm right ovarian hemorrhagic cyst is    Normal appearance left ovary and uterus    Electronically signed by:  Jeanna Espinoza MD  11/2/2022 11:38 AM CDT Workstation: [id number]                                     CT Abdomen Pelvis With Contrast (Final result)  Result time 11/02/22 10:06:20      Final result by Jeanna Espinoza MD (11/02/22 10:06:20)                   Narrative:    All CT scans at this facility used dose modulation, iterative reconstruction and/or weight-based dosing when appropriate to reduce radiation doses  as low as reasonably achievable.    HISTORY: RLQ abdominal pain (Age >= 14y)    FINDINGS: Axial postcontrast imaging was performed with 100 mL Omnipaque 350 IV contrast .    CT ABDOMEN: The lung bases are clear.    The liver, spleen, pancreas and adrenal glands are normal. Gallbladder is absent  The kidneys enhance symmetrically without hydronephrosis or calculi there are small bilateral renal cysts.    There are no thick-walled or dilated bowel loops. There are postsurgical changes from gastric sleeve procedure. The appendix is normal.    There is no mesenteric or retroperitoneal adenopathy. Aorta is  normal in caliber. The musculature is normal.    CT PELVIS: There is minimal free fluid within the cul-de-sac. The uterus, ovaries and bladder are normal. There is no pelvic adenopathy. There are no osseous abnormalities.    IMPRESSION: No evidence of appendicitis    Minimal free fluid in the cul-de-sac most likely physiologic    Cholecystectomy and gastric sleeve procedure    Electronically signed by:  Jeanna Espinoza MD  11/2/2022 10:06 AM CDT Workstation: [id number]                                     Medications   ketorolac injection 15 mg (has no administration in time range)   HYDROmorphone injection 1 mg (has no administration in time range)   sodium chloride 0.9% bolus 1,000 mL (0 mLs Intravenous Stopped 11/2/22 0905)   ondansetron injection 4 mg (4 mg Intravenous Given 11/2/22 0805)   morphine injection 4 mg (4 mg Intravenous Given 11/2/22 0809)   iohexoL (OMNIPAQUE 350) injection 100 mL (100 mLs Intravenous Given 11/2/22 0935)   morphine injection 4 mg (4 mg Intravenous Given 11/2/22 1010)     Medical Decision Making:   ED Management:  Patient presents emergency department reported abdominal pain CT scan showed no evidence of appendicitis there was noted some is a normal free fluid in the pelvis ultrasound shows a right-sided hemorrhagic ovarian cyst no evidence of torsion at this time will treat with anti-inflammatories pain control outpatient follow-up with Gynecology return to the ER for any worsened symptoms or new symptoms concerns                        Clinical Impression:   Final diagnoses:  [N83.209] Hemorrhagic ovarian cyst (Primary)      ED Disposition Condition    Discharge Stable          ED Prescriptions       Medication Sig Dispense Start Date End Date Auth. Provider    diclofenac (VOLTAREN) 75 MG EC tablet Take 1 tablet (75 mg total) by mouth 2 (two) times daily. 14 tablet 11/2/2022 -- Gurpreet Frank MD    HYDROcodone-acetaminophen (NORCO) 5-325 mg per tablet Take 1 tablet by mouth  every 6 (six) hours as needed for Pain. 12 tablet 11/2/2022 -- Gurpreet Frank MD          Follow-up Information       Follow up With Specialties Details Why Contact Info    Becca Eastman MD Obstetrics, Obstetrics and Gynecology Call today for re-examination of your symptoms [address]  [address]  [facility]  [place] [address]  [phone]               Gurpreet Frank MD  11/02/22 1212

## 2022-11-02 NOTE — ED NOTES
"PRIVATE ROOM. EVEN AND NON LABORED RESPIRATIONS.  AIRWAY CLEAR.  PULSES REGULAR.  < 3" CAPILLARY REFILL. SKIN WDI.  MAEW.  NON DISTENDED OBESE ABDOMEN. C/O LOW ABDOMINAL PAIN. DENIES VAGINAL DISCHARGE, URINARY FREQUENCY OR URGENCY. ALERT, ORIENTED AND AMBULATORY. CALM  AT THIS TIME.  CALL LIGHT IN REACH.   "

## 2022-11-03 ENCOUNTER — ANESTHESIA (OUTPATIENT)
Dept: SURGERY | Facility: HOSPITAL | Age: 27
End: 2022-11-03
Payer: COMMERCIAL

## 2022-11-03 ENCOUNTER — ANESTHESIA EVENT (OUTPATIENT)
Dept: SURGERY | Facility: HOSPITAL | Age: 27
End: 2022-11-03
Payer: COMMERCIAL

## 2022-11-03 ENCOUNTER — HOSPITAL ENCOUNTER (OUTPATIENT)
Facility: HOSPITAL | Age: 27
Discharge: HOME | End: 2022-11-04
Attending: EMERGENCY MEDICINE | Admitting: SURGERY
Payer: MEDICAID

## 2022-11-03 DIAGNOSIS — K35.30 ACUTE APPENDICITIS WITH LOCALIZED PERITONITIS, WITHOUT PERFORATION, ABSCESS, OR GANGRENE: Primary | ICD-10-CM

## 2022-11-03 DIAGNOSIS — N83.201 RIGHT OVARIAN CYST: ICD-10-CM

## 2022-11-03 DIAGNOSIS — R10.31 RIGHT LOWER QUADRANT ABDOMINAL PAIN: ICD-10-CM

## 2022-11-03 LAB
ALBUMIN SERPL BCP-MCNC: 3.6 G/DL (ref 3.5–5.2)
ALP SERPL-CCNC: 63 U/L (ref 55–135)
ALT SERPL W/O P-5'-P-CCNC: 15 U/L (ref 10–44)
ANION GAP SERPL CALC-SCNC: 5 MMOL/L (ref 8–16)
AST SERPL-CCNC: 11 U/L (ref 10–40)
BASOPHILS # BLD AUTO: 0.01 K/UL (ref 0–0.2)
BASOPHILS NFR BLD: 0.1 % (ref 0–1.9)
BILIRUB SERPL-MCNC: 0.8 MG/DL (ref 0.1–1)
BUN SERPL-MCNC: 9 MG/DL (ref 6–20)
CALCIUM SERPL-MCNC: 8.6 MG/DL (ref 8.7–10.5)
CHLORIDE SERPL-SCNC: 105 MMOL/L (ref 95–110)
CO2 SERPL-SCNC: 26 MMOL/L (ref 23–29)
CREAT SERPL-MCNC: 0.8 MG/DL (ref 0.5–1.4)
DIFFERENTIAL METHOD: ABNORMAL
EOSINOPHIL # BLD AUTO: 0 K/UL (ref 0–0.5)
EOSINOPHIL NFR BLD: 0.5 % (ref 0–8)
ERYTHROCYTE [DISTWIDTH] IN BLOOD BY AUTOMATED COUNT: 14 % (ref 11.5–14.5)
EST. GFR  (NO RACE VARIABLE): >60 ML/MIN/1.73 M^2
GLUCOSE SERPL-MCNC: 79 MG/DL (ref 70–110)
HCT VFR BLD AUTO: 33.5 % (ref 37–48.5)
HGB BLD-MCNC: 10.7 G/DL (ref 12–16)
IMM GRANULOCYTES # BLD AUTO: 0.02 K/UL (ref 0–0.04)
IMM GRANULOCYTES NFR BLD AUTO: 0.3 % (ref 0–0.5)
LYMPHOCYTES # BLD AUTO: 1.3 K/UL (ref 1–4.8)
LYMPHOCYTES NFR BLD: 18 % (ref 18–48)
MAGNESIUM SERPL-MCNC: 1.9 MG/DL (ref 1.6–2.6)
MCH RBC QN AUTO: 26.4 PG (ref 27–31)
MCHC RBC AUTO-ENTMCNC: 31.9 G/DL (ref 32–36)
MCV RBC AUTO: 83 FL (ref 82–98)
MONOCYTES # BLD AUTO: 0.5 K/UL (ref 0.3–1)
MONOCYTES NFR BLD: 6.9 % (ref 4–15)
NEUTROPHILS # BLD AUTO: 5.5 K/UL (ref 1.8–7.7)
NEUTROPHILS NFR BLD: 74.2 % (ref 38–73)
NRBC BLD-RTO: 0 /100 WBC
PLATELET # BLD AUTO: 186 K/UL (ref 150–450)
PMV BLD AUTO: 10.7 FL (ref 9.2–12.9)
POTASSIUM SERPL-SCNC: 4.1 MMOL/L (ref 3.5–5.1)
PROT SERPL-MCNC: 6.7 G/DL (ref 6–8.4)
RBC # BLD AUTO: 4.06 M/UL (ref 4–5.4)
SODIUM SERPL-SCNC: 136 MMOL/L (ref 136–145)
WBC # BLD AUTO: 7.35 K/UL (ref 3.9–12.7)

## 2022-11-03 PROCEDURE — 37000008 HC ANESTHESIA 1ST 15 MINUTES: Performed by: SURGERY

## 2022-11-03 PROCEDURE — 83735 ASSAY OF MAGNESIUM: CPT | Performed by: EMERGENCY MEDICINE

## 2022-11-03 PROCEDURE — 71000039 HC RECOVERY, EACH ADD'L HOUR: Performed by: SURGERY

## 2022-11-03 PROCEDURE — G0378 HOSPITAL OBSERVATION PER HR: HCPCS

## 2022-11-03 PROCEDURE — 99219 PR INITIAL OBSERVATION CARE,LEVL II: CPT | Mod: 57,,, | Performed by: SURGERY

## 2022-11-03 PROCEDURE — D9220A PRA ANESTHESIA: Mod: CRNA,,, | Performed by: NURSE ANESTHETIST, CERTIFIED REGISTERED

## 2022-11-03 PROCEDURE — 63600175 PHARM REV CODE 636 W HCPCS: Performed by: NURSE ANESTHETIST, CERTIFIED REGISTERED

## 2022-11-03 PROCEDURE — D9220A PRA ANESTHESIA: ICD-10-PCS | Mod: ANES,,, | Performed by: STUDENT IN AN ORGANIZED HEALTH CARE EDUCATION/TRAINING PROGRAM

## 2022-11-03 PROCEDURE — 36000709 HC OR TIME LEV III EA ADD 15 MIN: Performed by: SURGERY

## 2022-11-03 PROCEDURE — D9220A PRA ANESTHESIA: Mod: ANES,,, | Performed by: STUDENT IN AN ORGANIZED HEALTH CARE EDUCATION/TRAINING PROGRAM

## 2022-11-03 PROCEDURE — 96376 TX/PRO/DX INJ SAME DRUG ADON: CPT | Mod: 59

## 2022-11-03 PROCEDURE — 44970 LAPAROSCOPY APPENDECTOMY: CPT | Mod: ,,, | Performed by: SURGERY

## 2022-11-03 PROCEDURE — 94799 UNLISTED PULMONARY SVC/PX: CPT

## 2022-11-03 PROCEDURE — 85025 COMPLETE CBC W/AUTO DIFF WBC: CPT | Performed by: EMERGENCY MEDICINE

## 2022-11-03 PROCEDURE — 63600175 PHARM REV CODE 636 W HCPCS: Performed by: SURGERY

## 2022-11-03 PROCEDURE — 27201423 OPTIME MED/SURG SUP & DEVICES STERILE SUPPLY: Performed by: SURGERY

## 2022-11-03 PROCEDURE — 94761 N-INVAS EAR/PLS OXIMETRY MLT: CPT

## 2022-11-03 PROCEDURE — 25000003 PHARM REV CODE 250: Performed by: NURSE ANESTHETIST, CERTIFIED REGISTERED

## 2022-11-03 PROCEDURE — 99219 PR INITIAL OBSERVATION CARE,LEVL II: ICD-10-PCS | Mod: 57,,, | Performed by: SURGERY

## 2022-11-03 PROCEDURE — 71000033 HC RECOVERY, INTIAL HOUR: Performed by: SURGERY

## 2022-11-03 PROCEDURE — C9290 INJ, BUPIVACAINE LIPOSOME: HCPCS | Performed by: SURGERY

## 2022-11-03 PROCEDURE — 36000708 HC OR TIME LEV III 1ST 15 MIN: Performed by: SURGERY

## 2022-11-03 PROCEDURE — 99285 EMERGENCY DEPT VISIT HI MDM: CPT

## 2022-11-03 PROCEDURE — 37000009 HC ANESTHESIA EA ADD 15 MINS: Performed by: SURGERY

## 2022-11-03 PROCEDURE — 44970 PR LAP,APPENDECTOMY: ICD-10-PCS | Mod: ,,, | Performed by: SURGERY

## 2022-11-03 PROCEDURE — 25000003 PHARM REV CODE 250: Performed by: SURGERY

## 2022-11-03 PROCEDURE — D9220A PRA ANESTHESIA: ICD-10-PCS | Mod: CRNA,,, | Performed by: NURSE ANESTHETIST, CERTIFIED REGISTERED

## 2022-11-03 PROCEDURE — 25000003 PHARM REV CODE 250: Performed by: EMERGENCY MEDICINE

## 2022-11-03 PROCEDURE — 63600175 PHARM REV CODE 636 W HCPCS: Performed by: STUDENT IN AN ORGANIZED HEALTH CARE EDUCATION/TRAINING PROGRAM

## 2022-11-03 PROCEDURE — 63600175 PHARM REV CODE 636 W HCPCS: Performed by: EMERGENCY MEDICINE

## 2022-11-03 PROCEDURE — 96361 HYDRATE IV INFUSION ADD-ON: CPT

## 2022-11-03 PROCEDURE — 96375 TX/PRO/DX INJ NEW DRUG ADDON: CPT | Mod: 59

## 2022-11-03 PROCEDURE — 80053 COMPREHEN METABOLIC PANEL: CPT | Performed by: EMERGENCY MEDICINE

## 2022-11-03 PROCEDURE — 25000003 PHARM REV CODE 250: Performed by: STUDENT IN AN ORGANIZED HEALTH CARE EDUCATION/TRAINING PROGRAM

## 2022-11-03 RX ORDER — SUCCINYLCHOLINE CHLORIDE 20 MG/ML
INJECTION INTRAMUSCULAR; INTRAVENOUS
Status: DISCONTINUED | OUTPATIENT
Start: 2022-11-03 | End: 2022-11-03

## 2022-11-03 RX ORDER — HYDROMORPHONE HYDROCHLORIDE 1 MG/ML
1 INJECTION, SOLUTION INTRAMUSCULAR; INTRAVENOUS; SUBCUTANEOUS
Status: COMPLETED | OUTPATIENT
Start: 2022-11-03 | End: 2022-11-03

## 2022-11-03 RX ORDER — DEXAMETHASONE SODIUM PHOSPHATE 4 MG/ML
INJECTION, SOLUTION INTRA-ARTICULAR; INTRALESIONAL; INTRAMUSCULAR; INTRAVENOUS; SOFT TISSUE
Status: DISCONTINUED | OUTPATIENT
Start: 2022-11-03 | End: 2022-11-03

## 2022-11-03 RX ORDER — MIDAZOLAM HYDROCHLORIDE 1 MG/ML
INJECTION INTRAMUSCULAR; INTRAVENOUS
Status: DISCONTINUED | OUTPATIENT
Start: 2022-11-03 | End: 2022-11-03

## 2022-11-03 RX ORDER — ROCURONIUM BROMIDE 10 MG/ML
INJECTION, SOLUTION INTRAVENOUS
Status: DISCONTINUED | OUTPATIENT
Start: 2022-11-03 | End: 2022-11-03

## 2022-11-03 RX ORDER — TALC
6 POWDER (GRAM) TOPICAL NIGHTLY PRN
Status: DISCONTINUED | OUTPATIENT
Start: 2022-11-03 | End: 2022-11-04 | Stop reason: HOSPADM

## 2022-11-03 RX ORDER — PREDNISONE 20 MG/1
20 TABLET ORAL DAILY
Status: DISCONTINUED | OUTPATIENT
Start: 2022-11-04 | End: 2022-11-04 | Stop reason: HOSPADM

## 2022-11-03 RX ORDER — ONDANSETRON 2 MG/ML
4 INJECTION INTRAMUSCULAR; INTRAVENOUS DAILY PRN
Status: DISCONTINUED | OUTPATIENT
Start: 2022-11-03 | End: 2022-11-03 | Stop reason: HOSPADM

## 2022-11-03 RX ORDER — SERTRALINE HYDROCHLORIDE 50 MG/1
50 TABLET, FILM COATED ORAL DAILY
COMMUNITY
Start: 2022-07-22 | End: 2023-02-08

## 2022-11-03 RX ORDER — MUPIROCIN 20 MG/G
1 OINTMENT TOPICAL 2 TIMES DAILY
Status: DISCONTINUED | OUTPATIENT
Start: 2022-11-03 | End: 2022-11-04 | Stop reason: HOSPADM

## 2022-11-03 RX ORDER — FENTANYL CITRATE 50 UG/ML
INJECTION, SOLUTION INTRAMUSCULAR; INTRAVENOUS
Status: DISCONTINUED | OUTPATIENT
Start: 2022-11-03 | End: 2022-11-03

## 2022-11-03 RX ORDER — DIPHENHYDRAMINE HYDROCHLORIDE 50 MG/ML
12.5 INJECTION INTRAMUSCULAR; INTRAVENOUS
Status: DISCONTINUED | OUTPATIENT
Start: 2022-11-03 | End: 2022-11-03 | Stop reason: HOSPADM

## 2022-11-03 RX ORDER — LIDOCAINE HYDROCHLORIDE 20 MG/ML
INJECTION, SOLUTION EPIDURAL; INFILTRATION; INTRACAUDAL; PERINEURAL
Status: DISCONTINUED | OUTPATIENT
Start: 2022-11-03 | End: 2022-11-03

## 2022-11-03 RX ORDER — SODIUM CHLORIDE 0.9 % (FLUSH) 0.9 %
10 SYRINGE (ML) INJECTION
Status: DISCONTINUED | OUTPATIENT
Start: 2022-11-03 | End: 2022-11-04 | Stop reason: HOSPADM

## 2022-11-03 RX ORDER — SODIUM CHLORIDE 9 MG/ML
1000 INJECTION, SOLUTION INTRAVENOUS
Status: ACTIVE | OUTPATIENT
Start: 2022-11-03 | End: 2022-11-04

## 2022-11-03 RX ORDER — SERTRALINE HYDROCHLORIDE 50 MG/1
50 TABLET, FILM COATED ORAL DAILY
Status: DISCONTINUED | OUTPATIENT
Start: 2022-11-04 | End: 2022-11-04 | Stop reason: HOSPADM

## 2022-11-03 RX ORDER — OXYCODONE HYDROCHLORIDE 5 MG/1
5 TABLET ORAL
Status: DISCONTINUED | OUTPATIENT
Start: 2022-11-03 | End: 2022-11-03 | Stop reason: HOSPADM

## 2022-11-03 RX ORDER — PREDNISONE 10 MG/1
20 TABLET ORAL DAILY
COMMUNITY
Start: 2022-10-18 | End: 2023-02-08

## 2022-11-03 RX ORDER — ONDANSETRON 4 MG/1
TABLET, ORALLY DISINTEGRATING ORAL
COMMUNITY
End: 2022-11-03

## 2022-11-03 RX ORDER — ONDANSETRON 4 MG/1
8 TABLET, ORALLY DISINTEGRATING ORAL EVERY 8 HOURS PRN
Status: DISCONTINUED | OUTPATIENT
Start: 2022-11-03 | End: 2022-11-04 | Stop reason: HOSPADM

## 2022-11-03 RX ORDER — ONDANSETRON 2 MG/ML
4 INJECTION INTRAMUSCULAR; INTRAVENOUS
Status: COMPLETED | OUTPATIENT
Start: 2022-11-03 | End: 2022-11-03

## 2022-11-03 RX ORDER — HYDROMORPHONE HYDROCHLORIDE 1 MG/ML
0.2 INJECTION, SOLUTION INTRAMUSCULAR; INTRAVENOUS; SUBCUTANEOUS EVERY 5 MIN PRN
Status: DISCONTINUED | OUTPATIENT
Start: 2022-11-03 | End: 2022-11-03 | Stop reason: HOSPADM

## 2022-11-03 RX ORDER — ONDANSETRON 2 MG/ML
INJECTION INTRAMUSCULAR; INTRAVENOUS
Status: DISCONTINUED | OUTPATIENT
Start: 2022-11-03 | End: 2022-11-03

## 2022-11-03 RX ORDER — ACETAMINOPHEN 10 MG/ML
INJECTION, SOLUTION INTRAVENOUS
Status: DISCONTINUED | OUTPATIENT
Start: 2022-11-03 | End: 2022-11-03

## 2022-11-03 RX ORDER — LIDOCAINE HYDROCHLORIDE 20 MG/ML
JELLY TOPICAL
Status: DISCONTINUED | OUTPATIENT
Start: 2022-11-03 | End: 2022-11-03

## 2022-11-03 RX ORDER — ACETAMINOPHEN 325 MG/1
650 TABLET ORAL EVERY 8 HOURS PRN
Status: DISCONTINUED | OUTPATIENT
Start: 2022-11-03 | End: 2022-11-04 | Stop reason: HOSPADM

## 2022-11-03 RX ORDER — MEPERIDINE HYDROCHLORIDE 50 MG/ML
12.5 INJECTION INTRAMUSCULAR; INTRAVENOUS; SUBCUTANEOUS EVERY 10 MIN PRN
Status: DISCONTINUED | OUTPATIENT
Start: 2022-11-03 | End: 2022-11-03 | Stop reason: HOSPADM

## 2022-11-03 RX ORDER — HYDROCODONE BITARTRATE AND ACETAMINOPHEN 5; 325 MG/1; MG/1
1 TABLET ORAL EVERY 4 HOURS PRN
Status: DISCONTINUED | OUTPATIENT
Start: 2022-11-03 | End: 2022-11-04 | Stop reason: HOSPADM

## 2022-11-03 RX ORDER — FAMOTIDINE 10 MG/ML
INJECTION INTRAVENOUS
Status: DISCONTINUED | OUTPATIENT
Start: 2022-11-03 | End: 2022-11-03

## 2022-11-03 RX ORDER — BUPIVACAINE HYDROCHLORIDE AND EPINEPHRINE 2.5; 5 MG/ML; UG/ML
INJECTION, SOLUTION EPIDURAL; INFILTRATION; INTRACAUDAL; PERINEURAL
Status: DISCONTINUED | OUTPATIENT
Start: 2022-11-03 | End: 2022-11-03 | Stop reason: HOSPADM

## 2022-11-03 RX ORDER — HYDROMORPHONE HYDROCHLORIDE 1 MG/ML
1 INJECTION, SOLUTION INTRAMUSCULAR; INTRAVENOUS; SUBCUTANEOUS EVERY 4 HOURS PRN
Status: DISCONTINUED | OUTPATIENT
Start: 2022-11-03 | End: 2022-11-04 | Stop reason: HOSPADM

## 2022-11-03 RX ORDER — LIDOCAINE HYDROCHLORIDE 10 MG/ML
1 INJECTION, SOLUTION EPIDURAL; INFILTRATION; INTRACAUDAL; PERINEURAL ONCE AS NEEDED
Status: DISCONTINUED | OUTPATIENT
Start: 2022-11-03 | End: 2022-11-04 | Stop reason: HOSPADM

## 2022-11-03 RX ORDER — PROPOFOL 10 MG/ML
VIAL (ML) INTRAVENOUS
Status: DISCONTINUED | OUTPATIENT
Start: 2022-11-03 | End: 2022-11-03

## 2022-11-03 RX ORDER — BUTALBITAL, ACETAMINOPHEN AND CAFFEINE 300; 40; 50 MG/1; MG/1; MG/1
CAPSULE ORAL
COMMUNITY
End: 2022-11-03

## 2022-11-03 RX ADMIN — SUGAMMADEX 100 MG: 100 INJECTION, SOLUTION INTRAVENOUS at 04:11

## 2022-11-03 RX ADMIN — MIDAZOLAM HYDROCHLORIDE 2 MG: 1 INJECTION, SOLUTION INTRAMUSCULAR; INTRAVENOUS at 03:11

## 2022-11-03 RX ADMIN — LIDOCAINE HYDROCHLORIDE 1 EACH: 20 JELLY TOPICAL at 03:11

## 2022-11-03 RX ADMIN — OXYCODONE HYDROCHLORIDE 5 MG: 5 TABLET ORAL at 04:11

## 2022-11-03 RX ADMIN — Medication 120 MG: at 03:11

## 2022-11-03 RX ADMIN — ROCURONIUM BROMIDE 10 MG: 10 INJECTION, SOLUTION INTRAVENOUS at 03:11

## 2022-11-03 RX ADMIN — SODIUM CHLORIDE, SODIUM LACTATE, POTASSIUM CHLORIDE, AND CALCIUM CHLORIDE: .6; .31; .03; .02 INJECTION, SOLUTION INTRAVENOUS at 03:11

## 2022-11-03 RX ADMIN — HYDROMORPHONE HYDROCHLORIDE 0.2 MG: 1 INJECTION, SOLUTION INTRAMUSCULAR; INTRAVENOUS; SUBCUTANEOUS at 04:11

## 2022-11-03 RX ADMIN — DIPHENHYDRAMINE HYDROCHLORIDE 12.5 MG: 50 INJECTION INTRAMUSCULAR; INTRAVENOUS at 06:11

## 2022-11-03 RX ADMIN — DEXAMETHASONE SODIUM PHOSPHATE 4 MG: 4 INJECTION, SOLUTION INTRAMUSCULAR; INTRAVENOUS at 03:11

## 2022-11-03 RX ADMIN — LIDOCAINE HYDROCHLORIDE 80 MG: 20 INJECTION, SOLUTION EPIDURAL; INFILTRATION; INTRACAUDAL; PERINEURAL at 03:11

## 2022-11-03 RX ADMIN — PROPOFOL 200 MG: 10 INJECTION, EMULSION INTRAVENOUS at 03:11

## 2022-11-03 RX ADMIN — ONDANSETRON 4 MG: 2 INJECTION INTRAMUSCULAR; INTRAVENOUS at 03:11

## 2022-11-03 RX ADMIN — PIPERACILLIN SODIUM AND TAZOBACTAM SODIUM 3.38 G: 2; .25 INJECTION, POWDER, LYOPHILIZED, FOR SOLUTION INTRAVENOUS at 03:11

## 2022-11-03 RX ADMIN — HYDROCODONE BITARTRATE AND ACETAMINOPHEN 1 TABLET: 5; 325 TABLET ORAL at 06:11

## 2022-11-03 RX ADMIN — HYDROMORPHONE HYDROCHLORIDE 1 MG: 1 INJECTION, SOLUTION INTRAMUSCULAR; INTRAVENOUS; SUBCUTANEOUS at 09:11

## 2022-11-03 RX ADMIN — HYDROMORPHONE HYDROCHLORIDE 1 MG: 1 INJECTION, SOLUTION INTRAMUSCULAR; INTRAVENOUS; SUBCUTANEOUS at 12:11

## 2022-11-03 RX ADMIN — MUPIROCIN 1 G: 20 OINTMENT TOPICAL at 09:11

## 2022-11-03 RX ADMIN — SODIUM CHLORIDE 1000 ML: 0.9 INJECTION, SOLUTION INTRAVENOUS at 12:11

## 2022-11-03 RX ADMIN — ACETAMINOPHEN 1000 MG: 10 INJECTION, SOLUTION INTRAVENOUS at 03:11

## 2022-11-03 RX ADMIN — HYDROMORPHONE HYDROCHLORIDE 0.2 MG: 1 INJECTION, SOLUTION INTRAMUSCULAR; INTRAVENOUS; SUBCUTANEOUS at 05:11

## 2022-11-03 RX ADMIN — FENTANYL CITRATE 100 MCG: 50 INJECTION INTRAMUSCULAR; INTRAVENOUS at 03:11

## 2022-11-03 RX ADMIN — ONDANSETRON 4 MG: 2 INJECTION, SOLUTION INTRAMUSCULAR; INTRAVENOUS at 04:11

## 2022-11-03 RX ADMIN — FAMOTIDINE 20 MG: 10 INJECTION, SOLUTION INTRAVENOUS at 03:11

## 2022-11-03 RX ADMIN — ONDANSETRON 4 MG: 2 INJECTION INTRAMUSCULAR; INTRAVENOUS at 12:11

## 2022-11-03 NOTE — ED PROVIDER NOTES
Encounter Date: 11/3/2022       History     Chief Complaint   Patient presents with    Abdominal Pain     RLQ pain for 2 days and is worsening     Patient returns emergency department reported abdominal pain she was seen by me yesterday for evaluation for right lower quadrant pain CT scan the abdomen showed no appendicitis of was evidence of some free fluid in the pelvis ultrasound showed a right hemorrhagic ovarian cyst on the measuring 2 centimeters with minimal free fluid in the pelvis no evidence of torsion that time her laboratory evaluation was within normal limits and I treated her symptoms likely pain from a hemorrhagic cyst she followed up with Dr. Eastman today her pain remains intractable with continued nausea vomiting      Review of patient's allergies indicates:  No Known Allergies  Past Medical History:   Diagnosis Date    PCOS (polycystic ovarian syndrome)      Past Surgical History:   Procedure Laterality Date     SECTION N/A 02/10/2022    Procedure:  SECTION  (PRIMARY)   (BREECH);  Surgeon: Becca Eastman MD;  Location: [facility] L[facility]D;  Service: OB/GYN;  Laterality: N/A;     SECTION      CHOLECYSTECTOMY      gastric sleeve       No family history on file.  Social History     Tobacco Use    Smoking status: Never    Smokeless tobacco: Never   Substance Use Topics    Alcohol use: Not Currently    Drug use: Never     Review of Systems   Constitutional:  Negative for chills and fever.   Gastrointestinal:  Positive for abdominal pain, nausea and vomiting. Negative for diarrhea.   Genitourinary:  Negative for dysuria and hematuria.   All other systems reviewed and are negative.    Physical Exam     Initial Vitals [22 1200]   BP Pulse Resp Temp SpO2   115/60 90 18 98.5 °F (36.9 °C) 99 %      MAP       --         Physical Exam    Constitutional: She appears well-developed and well-nourished. No distress.   HENT:   Head: Normocephalic and atraumatic.   Right Ear: External ear normal.    Left Ear: External ear normal.   Mouth/Throat: Oropharynx is clear and moist.   Eyes: Conjunctivae and EOM are normal. Pupils are equal, round, and reactive to light.   Neck: Neck supple.   Normal range of motion.  Cardiovascular:  Normal rate, regular rhythm, normal heart sounds and intact distal pulses.           Pulmonary/Chest: Breath sounds normal. No respiratory distress.   Abdominal: Abdomen is soft. Bowel sounds are normal. There is abdominal tenderness.   Generalized tenderness worse in the right lower and suprapubic regions   Musculoskeletal:         General: No edema. Normal range of motion.      Cervical back: Normal range of motion and neck supple.     Neurological: She is alert and oriented to person, place, and time. GCS score is 15. GCS eye subscore is 4. GCS verbal subscore is 5. GCS motor subscore is 6.   Skin: Skin is warm and dry. Capillary refill takes less than 2 seconds. No rash noted.   Psychiatric: She has a normal mood and affect. Her behavior is normal.       ED Course   Procedures  Labs Reviewed   CBC W/ AUTO DIFFERENTIAL - Abnormal; Notable for the following components:       Result Value    Hemoglobin 10.7 (*)     Hematocrit 33.5 (*)     MCH 26.4 (*)     MCHC 31.9 (*)     Gran % 74.2 (*)     All other components within normal limits   COMPREHENSIVE METABOLIC PANEL - Abnormal; Notable for the following components:    Calcium 8.6 (*)     Anion Gap 5 (*)     All other components within normal limits   MAGNESIUM   URINALYSIS, REFLEX TO URINE CULTURE   PREGNANCY TEST, URINE RAPID          Imaging Results    None          Medications   0.9%  NaCl infusion (has no administration in time range)   sodium chloride 0.9% bolus 1,000 mL (1,000 mLs Intravenous New Bag 11/3/22 1233)   HYDROmorphone injection 1 mg (1 mg Intravenous Given 11/3/22 1232)   ondansetron injection 4 mg (4 mg Intravenous Given 11/3/22 1232)     Medical Decision Making:   ED Management:  I have consult Dr. Lopez who has  evaluated patient in the emergency department he is recommending appendectomy will admit patient to the operating room for operative management                        Clinical Impression:   Final diagnoses:  [R10.31] Right lower quadrant abdominal pain      ED Disposition Condition    Observation                 Gurpreet Frank MD  11/03/22 9528

## 2022-11-03 NOTE — TRANSFER OF CARE
"Anesthesia Transfer of Care Note    Patient: Rina Hearn    Procedure(s) Performed: Procedure(s) (LRB):  APPENDECTOMY, LAPAROSCOPIC (N/A)    Patient location: PACU    Anesthesia Type: general    Transport from OR: Transported from OR on room air with adequate spontaneous ventilation    Post pain: adequate analgesia    Post assessment: no apparent anesthetic complications and tolerated procedure well    Post vital signs: stable    Level of consciousness: responds to stimulation    Nausea/Vomiting: no nausea/vomiting    Complications: none    Transfer of care protocol was followedComments: SV, exchanging well.  To PACU, VSS upon arrival. Report to RN       Last vitals:   Visit Vitals  /60   Pulse 67   Temp 36.9 °C (98.5 °F) (Oral)   Resp 18   Ht 5' 11" (1.803 m)   Wt 123.4 kg (272 lb)   LMP 10/19/2022   SpO2 99%   BMI 37.94 kg/m²     "

## 2022-11-03 NOTE — ANESTHESIA PREPROCEDURE EVALUATION
2022  Rina Hearn is a 27 y.o., female.      Patient Active Problem List   Diagnosis    Acute appendicitis with localized peritonitis, without perforation, abscess, or gangrene    Right ovarian cyst       Past Surgical History:   Procedure Laterality Date     SECTION N/A 02/10/2022    Procedure:  SECTION  (PRIMARY)   (BREECH);  Surgeon: Becca Eastman MD;  Location: [facility] L&D;  Service: OB/GYN;  Laterality: N/A;     SECTION      CHOLECYSTECTOMY      gastric sleeve          Tobacco Use:  The patient  reports that she has never smoked. She has never used smokeless tobacco.     No results found for this or any previous visit.     Imaging Results    None          Lab Results   Component Value Date    WBC 7.35 2022    HGB 10.7 (L) 2022    HCT 33.5 (L) 2022    MCV 83 2022     2022     BMP  Lab Results   Component Value Date     2022    K 4.1 2022     2022    CO2 26 2022    BUN 9 2022    CREATININE 0.8 2022    CALCIUM 8.6 (L) 2022    ANIONGAP 5 (L) 2022    GLU 79 2022    GLU 97 2022    GLU 85 2021       No results found for this or any previous visit.            Pre-op Assessment    I have reviewed the Patient Summary Reports.    I have reviewed the Nursing Notes. I have reviewed the NPO Status.   I have reviewed the Medications.     Review of Systems  Anesthesia Hx:  Patient reports painful placement with 1st epidural.  Patient also reports slow to wake with general anesthesia. Denies Family Hx of Anesthesia complications.   Denies Personal Hx of Anesthesia complications.   Social:  Non-Smoker, No Alcohol Use    Hematology/Oncology:     Oncology Normal    -- Anemia:   EENT/Dental:   Lower permanent retainer   Cardiovascular:  Cardiovascular Normal  ECG has been  reviewed. History of palpitations due to anxiety   Pulmonary:  Pulmonary Normal    Renal/:  Renal/ Normal     Hepatic/GI:   GERD History of vertical sleeve gastrectomy   Musculoskeletal:   Occasional low back pain and pelvic clicking with pregnancy.  Occasional TMJ popping.   Neurological:   Headaches (Occasional)    Endocrine:  Endocrine Normal  Obesity / BMI > 30  Psych:   anxiety depression          Physical Exam  General:  Well nourished, Cooperative, Alert and Oriented      Airway/Jaw/Neck:  Airway Findings: Mouth Opening: Normal   Tongue: Normal   Mallampati: III Improves to II with phonation.  TM Distance: Normal, at least 6 cm   Jaw/Neck Findings:  Neck ROM: Normal ROM       Dental:  Dental Findings: In tact     Chest/Lungs:  Chest/Lungs Findings: Clear to auscultation, Normal Respiratory Rate      Heart/Vascular:  Heart Findings: Rate: Normal  Rhythm: Regular Rhythm  Sounds: Normal  Heart murmur: negative       Mental Status:  Mental Status Findings:  Cooperative, Alert and Oriented         Anesthesia Plan  Type of Anesthesia, risks & benefits discussed:  Anesthesia Type:  Gen ETT    Patient's Preference:   Plan Factors:          Intra-op Monitoring Plan: standard ASA monitors  Intra-op Monitoring Plan Comments:   Post Op Pain Control Plan: multimodal analgesia and IV/PO Opioids PRN  Post Op Pain Control Plan Comments:     Induction:   rapid sequence  Beta Blocker:         Informed Consent: Informed consent signed with the Patient and all parties understand the risks and agree with anesthesia plan.  All questions answered.  Anesthesia consent signed with patient.  ASA Score: 3   Emergent   Day of Surgery Review of History & Physical:        Anesthesia Plan Notes: Regine Rae        Ready For Surgery From Anesthesia Perspective.           Physical Exam  General: Well nourished, Cooperative, Alert and Oriented    Airway:  Mallampati: III / II  Mouth Opening: Normal  TM Distance: Normal, at least  6 cm  Tongue: Normal  Neck ROM: Normal ROM    Dental:  In tact    Chest/Lungs:  Clear to auscultation, Normal Respiratory Rate    Heart:  Rate: Normal  Rhythm: Regular Rhythm  Sounds: Normal          Anesthesia Plan  Type of Anesthesia, risks & benefits discussed:    Anesthesia Type: Gen ETT  Intra-op Monitoring Plan: standard ASA monitors  Post Op Pain Control Plan: multimodal analgesia and IV/PO Opioids PRN  Induction:  rapid sequence  Informed Consent: Informed consent signed with the Patient and all parties understand the risks and agree with anesthesia plan.  All questions answered.   ASA Score: 3 Emergent  Anesthesia Plan Notes: Regine Rae    Ready For Surgery From Anesthesia Perspective.       .

## 2022-11-03 NOTE — BRIEF OP NOTE
[facility]  Brief Operative Note    SUMMARY     Surgery Date: 11/3/2022     Surgeon(s) and Role:     * Sanjay Lopez III, MD - Primary    Assisting Surgeon: None    Pre-op Diagnosis:  Acute appendicitis with localized peritonitis, without perforation, abscess, or gangrene [K35.30] hemorrhagic right ovarian cyst     Post-op Diagnosis:  Post-Op Diagnosis Codes:     * Acute appendicitis with localized peritonitis, without perforation, abscess, or gangrene [K35.30], hemorrhagic ovarian cyst.     Procedure(s) (LRB):  APPENDECTOMY, LAPAROSCOPIC (N/A)    Anesthesia: General    Operative Findings: The patient was taken to the operating room and transferred to the operating room table in the supine position.  The patient was given general anesthesia and intubated.  Sotelo catheter was placed.  Abdomen was sterilely prepped and draped.  LUQ incision was made. Abdomen insufflated with Veress needle. Abdomen entered with the 5 mm visiport. The patient was put in Trendelenburg position.  Under direct visualization, a 5 mm port was placed in the suprapubic position in the midline.  Another 12 mm port was placed in the left lower quadrant.  The patient was then tilted a little to the left and the small bowel was swept away from the right lower quadrant.  The cecum was identified.  The appendix was identified.  It did not appear significantly inflamed.  Appendix was retracted anteriorly. The Ligasure was used to divide mesoappendix. The appendix was then transected at its base using a laparoscopic stapler.  The appendix was removed using an endocatch bag. There was brownish fluid in the pelvis most likely from the hemorrhagic ovarian cyst. It did not look like pus. It was suctioned. The ovaries were large but there was no active bleeding. The lower quadrants of the abdomen and pelvis were irrigated with suction . There was no evidence of any bleeding or leak at the end of the case, so our instruments  were removed, the ports were removed. The abdomen was deflated. The 12 mm port site abdominal fascia was closed using interrupted vicryl suture. The skin sites were closed using 4-0 Monocryl.  The patient's Sotelo catheter was removed.  Patient was extubated and brought to the recovery room in stable condition.     Estimated Blood Loss: 5 mL  Complications none   Instrument counts correct   Estimated Blood Loss has been documented.         Specimens:   Specimen (24h ago, onward)       Start     Ordered    11/03/22 1555  Specimen to Pathology - Surgery  Once        Comments: Pre-op Diagnosis: Acute appendicitis with localized peritonitis, without perforation, abscess, or gangrene [K35.30]Procedure(s):APPENDECTOMY, LAPAROSCOPIC Number of specimens: 1Name of specimens: APPENDIX     Question:  Release to patient  Answer:  Immediate    11/03/22 4830                    [id number]

## 2022-11-03 NOTE — ANESTHESIA POSTPROCEDURE EVALUATION
Anesthesia Post Evaluation    Patient: Rina Hearn    Procedure(s) Performed: Procedure(s) (LRB):  APPENDECTOMY, LAPAROSCOPIC (N/A)    Final Anesthesia Type: general      Patient location during evaluation: PACU  Patient participation: Yes- Able to Participate  Level of consciousness: awake and alert  Post-procedure vital signs: reviewed and stable  Pain management: adequate  Airway patency: patent    PONV status at discharge: No PONV  Anesthetic complications: no      Cardiovascular status: hemodynamically stable  Respiratory status: unassisted, spontaneous ventilation and room air  Hydration status: euvolemic  Follow-up not needed.          Vitals Value Taken Time   /63 11/03/22 1800   Temp 36.7 °C (98 °F) 11/03/22 1645   Pulse 70 11/03/22 1812   Resp 18 11/03/22 1800   SpO2 98 % 11/03/22 1812   Vitals shown include unvalidated device data.      Event Time   Out of Recovery 18:13:03         Pain/Shlomo Score: Pain Rating Prior to Med Admin: 6 (11/3/2022  5:15 PM)  Pain Rating Post Med Admin: 4 (11/2/2022  1:30 PM)  Shlomo Score: 10 (11/3/2022  6:00 PM)

## 2022-11-03 NOTE — CONSULTS
[facility] - Emergency Dept  General Surgery  Consult Note    Inpatient consult to General Surgery  Consult performed by: Sanjay Lopez III, MD  Consult ordered by: Gurpreet Frank MD      Subjective:     Chief Complaint/Reason for Admission:  Worsening right lower quadrant pain and nausea    History of Present Illness:  Patient is 27-year-old female with past medical history of polycystic ovarian disease, obesity with BMI 37.9.  She had sudden onset of severe lower abdominal pain associated with nausea Tuesday night.  She presented to the emergency department yesterday.  Found to be tender on exam worse in the right lower quadrant.  CT scan negative for any inflammatory process.  Appendix read as normal.  Pelvic ultrasound showed 2 cm right ovarian cyst probably hemorrhagic with normal-appearing tube and ovary.  Patient was discharged.  Overnight pain has become more severe.  It is constant.  She is had persistent nausea and several episodes of vomiting.  She saw her gyn who then recommended that she came back to the emergency department.  She is afebrile.  She is hemodynamically stable.  White blood cell count normal at 7.3.  She states that she has experienced symptoms of pelvic discomfort due to her polycystic ovarian disease but this seems to be much different.  She has past surgical history of gastric sleeve, cholecystectomy, .    UPT negative     Current Facility-Administered Medications on File Prior to Encounter   Medication    [COMPLETED] sodium chloride 0.9% bolus 1,000 mL     Current Outpatient Medications on File Prior to Encounter   Medication Sig    butalbital-acetaminophen-caff (FIORICET) -40 mg Cap Fioricet 50 mg-300 mg-40 mg capsule   Take 1 capsule every 8 hours by oral route as needed.    diclofenac (VOLTAREN) 75 MG EC tablet Take 1 tablet (75 mg total) by mouth 2 (two) times daily.    HYDROcodone-acetaminophen (NORCO) 5-325 mg per tablet Take 1 tablet by  mouth every 6 (six) hours as needed for Pain.    nystatin (MYCOSTATIN) powder Apply topically 2 (two) times daily.    ondansetron (ZOFRAN-ODT) 4 MG TbDL ondansetron 4 mg disintegrating tablet   Place 1 tablet twice a day by translingual route.    oxyCODONE-acetaminophen (PERCOCET) 5-325 mg per tablet Take 1-2 tablets by mouth every 4 (four) hours as needed for Pain. (Patient not taking: Reported on 2022)    predniSONE (DELTASONE) 10 MG tablet Take 20 mg by mouth once daily.    prenatal vit/iron fum/folic ac (PRENATAL 1+1 ORAL) Take by mouth.    sertraline (ZOLOFT) 50 MG tablet Take 50 mg by mouth once daily.       Review of patient's allergies indicates:  No Known Allergies    Past Medical History:   Diagnosis Date    PCOS (polycystic ovarian syndrome)      Past Surgical History:   Procedure Laterality Date     SECTION N/A 02/10/2022    Procedure:  SECTION  (PRIMARY)   (BREECH);  Surgeon: Becca Eastman MD;  Location: [facility] L&D;  Service: OB/GYN;  Laterality: N/A;     SECTION      CHOLECYSTECTOMY      gastric sleeve       Family History    None       Tobacco Use    Smoking status: Never    Smokeless tobacco: Never   Substance and Sexual Activity    Alcohol use: Not Currently    Drug use: Never    Sexual activity: Yes     Partners: Male     Review of Systems   Constitutional:  Negative for appetite change, chills, fever and unexpected weight change.   HENT:  Negative for hearing loss, rhinorrhea, sore throat and voice change.    Eyes:  Negative for photophobia and visual disturbance.   Respiratory:  Negative for cough, choking and shortness of breath.    Cardiovascular:  Negative for chest pain, palpitations and leg swelling.   Gastrointestinal:  Positive for abdominal pain, nausea and vomiting. Negative for blood in stool, constipation and diarrhea.   Endocrine: Negative for cold intolerance, heat intolerance, polydipsia and polyuria.   Musculoskeletal:  Negative for arthralgias, back  pain, joint swelling and neck stiffness.   Skin:  Negative for color change, pallor and rash.   Neurological:  Negative for dizziness, seizures, syncope and headaches.   Hematological:  Negative for adenopathy. Does not bruise/bleed easily.   Psychiatric/Behavioral:  Negative for agitation, behavioral problems and confusion.    Objective:     Vital Signs (Most Recent):  Temp: 98.5 °F (36.9 °C) (11/03/22 1200)  Pulse: 90 (11/03/22 1200)  Resp: 18 (11/03/22 1232)  BP: 115/60 (11/03/22 1200)  SpO2: 99 % (11/03/22 1200) Vital Signs (24h Range):  Temp:  [97.9 °F (36.6 °C)-98.5 °F (36.9 °C)] 98.5 °F (36.9 °C)  Pulse:  [64-90] 90  Resp:  [13-19] 18  SpO2:  [98 %-100 %] 99 %  BP: ()/(50-60) 115/60     Weight: 123.4 kg (272 lb)  Body mass index is 37.94 kg/m².    No intake or output data in the 24 hours ending 11/03/22 1342    Physical Exam  Vitals reviewed.   Constitutional:       General: She is awake. She is not in acute distress.     Appearance: She is morbidly obese. She is not toxic-appearing.   HENT:      Head: Normocephalic and atraumatic.   Pulmonary:      Effort: Pulmonary effort is normal. No tachypnea, bradypnea, accessory muscle usage or respiratory distress.   Abdominal:      General: There is no distension.      Palpations: Abdomen is soft.      Tenderness: There is abdominal tenderness in the right lower quadrant and suprapubic area. There is guarding.   Musculoskeletal:      Cervical back: Neck supple.   Neurological:      Mental Status: She is alert and oriented to person, place, and time.   Psychiatric:         Behavior: Behavior is cooperative.       Significant Labs:  CBC:   Recent Labs   Lab 11/03/22  1227   WBC 7.35   RBC 4.06   HGB 10.7*   HCT 33.5*      MCV 83   MCH 26.4*   MCHC 31.9*     CMP:   Recent Labs   Lab 11/03/22  1227   GLU 79   CALCIUM 8.6*   ALBUMIN 3.6   PROT 6.7      K 4.1   CO2 26      BUN 9   CREATININE 0.8   ALKPHOS 63   ALT 15   AST 11   BILITOT 0.8        Significant Diagnostics:  I have reviewed all pertinent imaging results/findings within the past 24 hours.    Assessment/Plan:   Worsening RLQ pain with nausea.   Possible acute appendicitis  Known 2 cm right ovarian cyst - hemorrhagic cyst     CT imaging showed normal appearing appendix yesterday. She has had worsening RLQ pain and nausea overnight after ED discharge. She is focally tender on exam with guarding in the RLQ. We had long discussion about her case. There is a high clinical suspicion for acute appendicitis. I have recommended and offered laparoscopy with appendectomy. She grace like to proceed with appendectomy. Risks and benefits discussed. Risks include possibility of removing normal appendix.     Thank you for your consult.     Sanjay Lopez III, MD  General Surgery  [facility] - Emergency Dept

## 2022-11-04 VITALS
WEIGHT: 271.63 LBS | TEMPERATURE: 98 F | OXYGEN SATURATION: 100 % | DIASTOLIC BLOOD PRESSURE: 75 MMHG | BODY MASS INDEX: 38.03 KG/M2 | HEART RATE: 69 BPM | HEIGHT: 71 IN | SYSTOLIC BLOOD PRESSURE: 129 MMHG | RESPIRATION RATE: 16 BRPM

## 2022-11-04 LAB
BACTERIA #/AREA URNS HPF: NEGATIVE /HPF
BILIRUB UR QL STRIP: NEGATIVE
CLARITY UR: CLEAR
COLOR UR: YELLOW
GLUCOSE UR QL STRIP: NEGATIVE
HGB UR QL STRIP: NEGATIVE
HYALINE CASTS #/AREA URNS LPF: 1 /LPF
KETONES UR QL STRIP: ABNORMAL
LEUKOCYTE ESTERASE UR QL STRIP: ABNORMAL
MICROSCOPIC COMMENT: NORMAL
NITRITE UR QL STRIP: NEGATIVE
PH UR STRIP: 7 [PH] (ref 5–8)
PROT UR QL STRIP: NEGATIVE
RBC #/AREA URNS HPF: 1 /HPF (ref 0–4)
SP GR UR STRIP: 1.01 (ref 1–1.03)
SQUAMOUS #/AREA URNS HPF: 2 /HPF
URN SPEC COLLECT METH UR: ABNORMAL
UROBILINOGEN UR STRIP-ACNC: NEGATIVE EU/DL
WBC #/AREA URNS HPF: 4 /HPF (ref 0–5)

## 2022-11-04 PROCEDURE — 96365 THER/PROPH/DIAG IV INF INIT: CPT

## 2022-11-04 PROCEDURE — G0378 HOSPITAL OBSERVATION PER HR: HCPCS

## 2022-11-04 PROCEDURE — 25000003 PHARM REV CODE 250: Performed by: SURGERY

## 2022-11-04 PROCEDURE — 99900035 HC TECH TIME PER 15 MIN (STAT)

## 2022-11-04 PROCEDURE — 81001 URINALYSIS AUTO W/SCOPE: CPT | Performed by: SURGERY

## 2022-11-04 PROCEDURE — 96376 TX/PRO/DX INJ SAME DRUG ADON: CPT

## 2022-11-04 PROCEDURE — 63600175 PHARM REV CODE 636 W HCPCS: Performed by: SURGERY

## 2022-11-04 RX ORDER — HYDROCODONE BITARTRATE AND ACETAMINOPHEN 5; 325 MG/1; MG/1
1 TABLET ORAL EVERY 6 HOURS PRN
Qty: 25 TABLET | Refills: 0 | Status: SHIPPED | OUTPATIENT
Start: 2022-11-04 | End: 2023-02-08

## 2022-11-04 RX ORDER — AMOXICILLIN AND CLAVULANATE POTASSIUM 875; 125 MG/1; MG/1
1 TABLET, FILM COATED ORAL EVERY 12 HOURS
Qty: 14 TABLET | Refills: 0 | Status: SHIPPED | OUTPATIENT
Start: 2022-11-04 | End: 2023-02-08

## 2022-11-04 RX ORDER — ONDANSETRON 4 MG/1
4 TABLET, FILM COATED ORAL 2 TIMES DAILY
Qty: 30 TABLET | Refills: 0 | Status: SHIPPED | OUTPATIENT
Start: 2022-11-04 | End: 2023-02-08

## 2022-11-04 RX ADMIN — PIPERACILLIN SODIUM AND TAZOBACTAM SODIUM 3.38 G: 3; .375 INJECTION, POWDER, LYOPHILIZED, FOR SOLUTION INTRAVENOUS at 03:11

## 2022-11-04 RX ADMIN — ONDANSETRON 8 MG: 4 TABLET, ORALLY DISINTEGRATING ORAL at 02:11

## 2022-11-04 RX ADMIN — MUPIROCIN 1 G: 20 OINTMENT TOPICAL at 08:11

## 2022-11-04 RX ADMIN — PIPERACILLIN SODIUM AND TAZOBACTAM SODIUM 3.38 G: 3; .375 INJECTION, POWDER, LYOPHILIZED, FOR SOLUTION INTRAVENOUS at 12:11

## 2022-11-04 RX ADMIN — HYDROMORPHONE HYDROCHLORIDE 1 MG: 1 INJECTION, SOLUTION INTRAMUSCULAR; INTRAVENOUS; SUBCUTANEOUS at 08:11

## 2022-11-04 RX ADMIN — PIPERACILLIN SODIUM AND TAZOBACTAM SODIUM 3.38 G: 3; .375 INJECTION, POWDER, LYOPHILIZED, FOR SOLUTION INTRAVENOUS at 08:11

## 2022-11-04 RX ADMIN — PREDNISONE 20 MG: 20 TABLET ORAL at 08:11

## 2022-11-04 RX ADMIN — SERTRALINE HYDROCHLORIDE 50 MG: 50 TABLET ORAL at 08:11

## 2022-11-04 RX ADMIN — HYDROMORPHONE HYDROCHLORIDE 1 MG: 1 INJECTION, SOLUTION INTRAMUSCULAR; INTRAVENOUS; SUBCUTANEOUS at 04:11

## 2022-11-04 RX ADMIN — HYDROCODONE BITARTRATE AND ACETAMINOPHEN 1 TABLET: 5; 325 TABLET ORAL at 12:11

## 2022-11-04 RX ADMIN — HYDROMORPHONE HYDROCHLORIDE 1 MG: 1 INJECTION, SOLUTION INTRAMUSCULAR; INTRAVENOUS; SUBCUTANEOUS at 02:11

## 2022-11-04 NOTE — PLAN OF CARE
[facility]  Initial Discharge Assessment       Primary Care Provider: Primary Doctor No    Admission Diagnosis: Acute appendicitis with localized peritonitis, without perforation, abscess, or gangrene [K35.30]  Right lower quadrant abdominal pain [R10.31]    Admission Date: 11/3/2022  Expected Discharge Date: TBD    Social work intern met with Pt at bedside to complete discharge assessment. Pt AAOx4s. Demographics and insurance verified. Pt stated she goes to OB for primary care. No home health. No dialysis. Pt reports ability to complete ADLs without assistance. Pt verbalized plan to discharge home via family transport. Pt has no other needs to be addressed at this time.    Discharge Barriers Identified: None    Payor: BLUE CROSS BLUE SHIELD / Plan: BCBS OF [employer] EPO / Product Type: Commercial /     Extended Emergency Contact Information  Primary Emergency Contact: NadiyaKeyon  Address: [address] [place] [address] [facility]  Home Phone: [phone]  Mobile Phone: [phone]  Relation: Spouse  Secondary Emergency Contact: Ashlyn Hearn  Mobile Phone: [phone]  Relation: Relative  Preferred language: English   needed? No    Discharge Plan A: Home with family  Discharge Plan B: Home with family      [id number] - [place], [place]  [address]  [address] [facility]  [address] [address]  Phone: [phone] Fax: [phone]    [facility] [address] [place], [place] - [address]  [address] [address]  Phone: [phone] Fax: [phone]      Initial Assessment (most recent)       Adult Discharge Assessment - 11/04/22 1108          Discharge Assessment    Assessment Type Discharge Planning Assessment     Confirmed/corrected address, phone number and insurance Yes     Confirmed Demographics Correct on Facesheet     Source of Information patient     Does patient/caregiver understand  observation status Yes     Communicated DELANEY with patient/caregiver Yes     Reason For Admission Acute appendicitis with localized peritonitis, without perforation, abscess, or gangrene     Lives With child(mt), dependent;spouse     Facility Arrived From: Home     Do you expect to return to your current living situation? Yes     Do you have help at home or someone to help you manage your care at home? Yes     Who are your caregiver(s) and their phone number(s)? Keyon Hearn (Spouse)   [phone]     Prior to hospitilization cognitive status: Alert/Oriented     Current cognitive status: Alert/Oriented     Walking or Climbing Stairs Difficulty none     Dressing/Bathing Difficulty none     Home Accessibility wheelchair accessible     Home Layout Able to live on 1st floor     Equipment Currently Used at Home none     Readmission within 30 days? No     Patient currently being followed by outpatient case management? No     Do you currently have service(s) that help you manage your care at home? No     Do you take prescription medications? No     Do you have prescription coverage? Yes     Coverage Payor:  [employer] [place] OF [place] [employer] [facility]     Do you have any problems affording any of your prescribed medications? No     Who is going to help you get home at discharge? Keyon Hearn (Spouse)   [phone]     How do you get to doctors appointments? car, drives self     Are you on dialysis? No     Do you take coumadin? No     Discharge Plan A Home with family     Discharge Plan B Home with family     DME Needed Upon Discharge  none     Discharge Plan discussed with: Patient     Discharge Barriers Identified None        Physical Activity    On average, how many days per week do you engage in moderate to strenuous exercise (like a brisk walk)? 5 days     On average, how many minutes do you engage in exercise at this level? 30 min        Financial Resource Strain    How hard is it for you to pay for the very  basics like food, housing, medical care, and heating? Somewhat hard        Housing Stability    In the last 12 months, was there a time when you were not able to pay the mortgage or rent on time? No     In the last 12 months, how many places have you lived? 2     In the last 12 months, was there a time when you did not have a steady place to sleep or slept in a shelter (including now)? No        Transportation Needs    In the past 12 months, has lack of transportation kept you from medical appointments or from getting medications? No     In the past 12 months, has lack of transportation kept you from meetings, work, or from getting things needed for daily living? No        Food Insecurity    Within the past 12 months, you worried that your food would run out before you got the money to buy more. Sometimes true     Within the past 12 months, the food you bought just didn't last and you didn't have money to get more. Never true        Stress    Do you feel stress - tense, restless, nervous, or anxious, or unable to sleep at night because your mind is troubled all the time - these days? To some extent        Social Connections    In a typical week, how many times do you talk on the phone with family, friends, or neighbors? More than three times a week     How often do you get together with friends or relatives? Once a week     How often do you attend Catholic or Anabaptist services? Never     Do you belong to any clubs or organizations such as Catholic groups, unions, fraternal or athletic groups, or school groups? No     How often do you attend meetings of the clubs or organizations you belong to? Never     Are you , , , , never , or living with a partner?         Alcohol Use    Q1: How often do you have a drink containing alcohol? Never     Q2: How many drinks containing alcohol do you have on a typical day when you are drinking? Patient does not drink     Q3: How often do you  have six or more drinks on one occasion? Never        Relationship/Environment    Name(s) of Who Lives With Patient Keyon Hearn (Spouse)   [phone]

## 2022-11-04 NOTE — PROGRESS NOTES
DC instructions reviewed with pt, pt verbalizes understanding, copy given to pt. Pt escorted off unit via wheelchair and family.

## 2022-11-04 NOTE — H&P
Chief Complaint/Reason for Admission:  Worsening right lower quadrant pain and nausea     History of Present Illness:  Patient is 27-year-old female with past medical history of polycystic ovarian disease, obesity with BMI 37.9.  She had sudden onset of severe lower abdominal pain associated with nausea Tuesday night.  She presented to the emergency department yesterday.  Found to be tender on exam worse in the right lower quadrant.  CT scan negative for any inflammatory process.  Appendix read as normal.  Pelvic ultrasound showed 2 cm right ovarian cyst probably hemorrhagic with normal-appearing tube and ovary.  Patient was discharged.  Overnight pain has become more severe.  It is constant.  She is had persistent nausea and several episodes of vomiting.  She saw her gyn who then recommended that she came back to the emergency department.  She is afebrile.  She is hemodynamically stable.  White blood cell count normal at 7.3.  She states that she has experienced symptoms of pelvic discomfort due to her polycystic ovarian disease but this seems to be much different.  She has past surgical history of gastric sleeve, cholecystectomy, .     UPT negative          Current Facility-Administered Medications on File Prior to Encounter   Medication    [COMPLETED] sodium chloride 0.9% bolus 1,000 mL           Current Outpatient Medications on File Prior to Encounter   Medication Sig    butalbital-acetaminophen-caff (FIORICET) -40 mg Cap Fioricet 50 mg-300 mg-40 mg capsule   Take 1 capsule every 8 hours by oral route as needed.    diclofenac (VOLTAREN) 75 MG EC tablet Take 1 tablet (75 mg total) by mouth 2 (two) times daily.    HYDROcodone-acetaminophen (NORCO) 5-325 mg per tablet Take 1 tablet by mouth every 6 (six) hours as needed for Pain.    nystatin (MYCOSTATIN) powder Apply topically 2 (two) times daily.    ondansetron (ZOFRAN-ODT) 4 MG TbDL ondansetron 4 mg disintegrating tablet   Place 1 tablet twice a  day by translingual route.    oxyCODONE-acetaminophen (PERCOCET) 5-325 mg per tablet Take 1-2 tablets by mouth every 4 (four) hours as needed for Pain. (Patient not taking: Reported on 2022)    predniSONE (DELTASONE) 10 MG tablet Take 20 mg by mouth once daily.    prenatal vit/iron fum/folic ac (PRENATAL 1+1 ORAL) Take by mouth.    sertraline (ZOLOFT) 50 MG tablet Take 50 mg by mouth once daily.         Review of patient's allergies indicates:  No Known Allergies          Past Medical History:   Diagnosis Date    PCOS (polycystic ovarian syndrome)              Past Surgical History:   Procedure Laterality Date     SECTION N/A 02/10/2022     Procedure:  SECTION  (PRIMARY)   (BREECH);  Surgeon: Becca Eastman MD;  Location: [facility] L&D;  Service: OB/GYN;  Laterality: N/A;     SECTION        CHOLECYSTECTOMY        gastric sleeve          Family History    None               Tobacco Use    Smoking status: Never    Smokeless tobacco: Never   Substance and Sexual Activity    Alcohol use: Not Currently    Drug use: Never    Sexual activity: Yes       Partners: Male      Review of Systems   Constitutional:  Negative for appetite change, chills, fever and unexpected weight change.   HENT:  Negative for hearing loss, rhinorrhea, sore throat and voice change.    Eyes:  Negative for photophobia and visual disturbance.   Respiratory:  Negative for cough, choking and shortness of breath.    Cardiovascular:  Negative for chest pain, palpitations and leg swelling.   Gastrointestinal:  Positive for abdominal pain, nausea and vomiting. Negative for blood in stool, constipation and diarrhea.   Endocrine: Negative for cold intolerance, heat intolerance, polydipsia and polyuria.   Musculoskeletal:  Negative for arthralgias, back pain, joint swelling and neck stiffness.   Skin:  Negative for color change, pallor and rash.   Neurological:  Negative for dizziness, seizures, syncope and headaches.    Hematological:  Negative for adenopathy. Does not bruise/bleed easily.   Psychiatric/Behavioral:  Negative for agitation, behavioral problems and confusion.    Objective:      Vital Signs (Most Recent):  Temp: 98.5 °F (36.9 °C) (11/03/22 1200)  Pulse: 90 (11/03/22 1200)  Resp: 18 (11/03/22 1232)  BP: 115/60 (11/03/22 1200)  SpO2: 99 % (11/03/22 1200) Vital Signs (24h Range):  Temp:  [97.9 °F (36.6 °C)-98.5 °F (36.9 °C)] 98.5 °F (36.9 °C)  Pulse:  [64-90] 90  Resp:  [13-19] 18  SpO2:  [98 %-100 %] 99 %  BP: ()/(50-60) 115/60      Weight: 123.4 kg (272 lb)  Body mass index is 37.94 kg/m².     No intake or output data in the 24 hours ending 11/03/22 1342     Physical Exam  Vitals reviewed.   Constitutional:       General: She is awake. She is not in acute distress.     Appearance: She is morbidly obese. She is not toxic-appearing.   HENT:      Head: Normocephalic and atraumatic.   Pulmonary:      Effort: Pulmonary effort is normal. No tachypnea, bradypnea, accessory muscle usage or respiratory distress.   Abdominal:      General: There is no distension.      Palpations: Abdomen is soft.      Tenderness: There is abdominal tenderness in the right lower quadrant and suprapubic area. There is guarding.   Musculoskeletal:      Cervical back: Neck supple.   Neurological:      Mental Status: She is alert and oriented to person, place, and time.   Psychiatric:         Behavior: Behavior is cooperative.         Significant Labs:  CBC:       Recent Labs   Lab 11/03/22  1227   WBC 7.35   RBC 4.06   HGB 10.7*   HCT 33.5*      MCV 83   MCH 26.4*   MCHC 31.9*      CMP:       Recent Labs   Lab 11/03/22  1227   GLU 79   CALCIUM 8.6*   ALBUMIN 3.6   PROT 6.7      K 4.1   CO2 26      BUN 9   CREATININE 0.8   ALKPHOS 63   ALT 15   AST 11   BILITOT 0.8         Significant Diagnostics:  I have reviewed all pertinent imaging results/findings within the past 24 hours.     Assessment/Plan:   Worsening RLQ pain with  nausea.   Possible acute appendicitis  Known 2 cm right ovarian cyst - hemorrhagic cyst      CT imaging showed normal appearing appendix yesterday. She has had worsening RLQ pain and nausea overnight after ED discharge. She is focally tender on exam with guarding in the RLQ. We had long discussion about her case. There is a high clinical suspicion for acute appendicitis. I have recommended and offered laparoscopy with appendectomy. She grace like to proceed with appendectomy. Risks and benefits discussed. Risks include possibility of removing normal appendix.      Thank you for your consult.      Sanjay Lopez III, MD  General Surgery  [facility] - Emergency Dept          Revision History

## 2022-11-04 NOTE — OP NOTE
Surgery Date: 11/3/2022     Surgeon(s) and Role:     * Sanjay Lopez III, MD - Primary    Assisting Surgeon: None    Pre-op Diagnosis:  Acute appendicitis with localized peritonitis, without perforation, abscess, or gangrene [K35.30] hemorrhagic right ovarian cyst     Post-op Diagnosis:  Post-Op Diagnosis Codes:     * Acute appendicitis with localized peritonitis, without perforation, abscess, or gangrene [K35.30], hemorrhagic ovarian cyst.     Procedure(s) (LRB):  APPENDECTOMY, LAPAROSCOPIC (N/A)    Anesthesia: General    Operative Findings: The patient was taken to the operating room and transferred to the operating room table in the supine position.  The patient was given general anesthesia and intubated.  Sotelo catheter was placed.  Abdomen was sterilely prepped and draped.  LUQ incision was made. Abdomen insufflated with Veress needle. Abdomen entered with the 5 mm visiport. The patient was put in Trendelenburg position.  Under direct visualization, a 5 mm port was placed in the suprapubic position in the midline.  Another 12 mm port was placed in the left lower quadrant.  The patient was then tilted a little to the left and the small bowel was swept away from the right lower quadrant.  The cecum was identified.  The appendix was identified.  It did not appear significantly inflamed.  Appendix was retracted anteriorly. The Ligasure was used to divide mesoappendix. The appendix was then transected at its base using a laparoscopic stapler.  The appendix was removed using an endocatch bag. There was brownish fluid in the pelvis most likely from the hemorrhagic ovarian cyst. It did not look like pus. It was suctioned. The ovaries were large but there was no active bleeding. The lower quadrants of the abdomen and pelvis were irrigated with suction . There was no evidence of any bleeding or leak at the end of the case, so our instruments were removed, the ports were removed. The abdomen was deflated.  The 12 mm port site abdominal fascia was closed using interrupted vicryl suture. The skin sites were closed using 4-0 Monocryl.  The patient's Sotelo catheter was removed.  Patient was extubated and brought to the recovery room in stable condition.     Estimated Blood Loss: 5 mL  Complications none   Instrument counts correct   Estimated Blood Loss has been documented.         Specimens:   Specimen (24h ago, onward)       Start     Ordered    11/03/22 1551  Specimen to Pathology - Surgery  Once        Comments: Pre-op Diagnosis: Acute appendicitis with localized peritonitis, without perforation, abscess, or gangrene [K35.30]Procedure(s):APPENDECTOMY, LAPAROSCOPIC Number of specimens: 1Name of specimens: APPENDIX     Question:  Release to patient  Answer:  Immediate    11/03/22 1892

## 2022-11-07 NOTE — PLAN OF CARE
Chart and discharge orders reviewed.  Patient discharged home with no further case management needs.     11/07/22 1437   Final Note   Assessment Type Final Discharge Note   Anticipated Discharge Disposition Home   Post-Acute Status   Discharge Delays None known at this time

## 2022-11-17 ENCOUNTER — OFFICE VISIT (OUTPATIENT)
Dept: SURGERY | Facility: CLINIC | Age: 27
End: 2022-11-17
Payer: COMMERCIAL

## 2022-11-17 VITALS — SYSTOLIC BLOOD PRESSURE: 123 MMHG | TEMPERATURE: 90 F | HEART RATE: 73 BPM | DIASTOLIC BLOOD PRESSURE: 76 MMHG

## 2022-11-17 DIAGNOSIS — K35.30 ACUTE APPENDICITIS WITH LOCALIZED PERITONITIS, WITHOUT PERFORATION, ABSCESS, OR GANGRENE: Primary | ICD-10-CM

## 2022-11-17 PROCEDURE — 3074F SYST BP LT 130 MM HG: CPT | Mod: CPTII,S$GLB,, | Performed by: SURGERY

## 2022-11-17 PROCEDURE — 3078F PR MOST RECENT DIASTOLIC BLOOD PRESSURE < 80 MM HG: ICD-10-PCS | Mod: CPTII,S$GLB,, | Performed by: SURGERY

## 2022-11-17 PROCEDURE — 99024 PR POST-OP FOLLOW-UP VISIT: ICD-10-PCS | Mod: S$GLB,,, | Performed by: SURGERY

## 2022-11-17 PROCEDURE — 1159F PR MEDICATION LIST DOCUMENTED IN MEDICAL RECORD: ICD-10-PCS | Mod: CPTII,S$GLB,, | Performed by: SURGERY

## 2022-11-17 PROCEDURE — 1160F PR REVIEW ALL MEDS BY PRESCRIBER/CLIN PHARMACIST DOCUMENTED: ICD-10-PCS | Mod: CPTII,S$GLB,, | Performed by: SURGERY

## 2022-11-17 PROCEDURE — 1159F MED LIST DOCD IN RCRD: CPT | Mod: CPTII,S$GLB,, | Performed by: SURGERY

## 2022-11-17 PROCEDURE — 3074F PR MOST RECENT SYSTOLIC BLOOD PRESSURE < 130 MM HG: ICD-10-PCS | Mod: CPTII,S$GLB,, | Performed by: SURGERY

## 2022-11-17 PROCEDURE — 1160F RVW MEDS BY RX/DR IN RCRD: CPT | Mod: CPTII,S$GLB,, | Performed by: SURGERY

## 2022-11-17 PROCEDURE — 99024 POSTOP FOLLOW-UP VISIT: CPT | Mod: S$GLB,,, | Performed by: SURGERY

## 2022-11-17 PROCEDURE — 3078F DIAST BP <80 MM HG: CPT | Mod: CPTII,S$GLB,, | Performed by: SURGERY

## 2022-11-22 NOTE — PROGRESS NOTES
Subjective:       Patient ID: Rina Hearn is a 27 y.o. female.    Chief Complaint: Post-op Evaluation      HPI:  Post op appendectomy. Feeling well. RLQ pain resolved. Path benign.     Review of Systems    Objective:      Physical Exam  Constitutional:       General: She is not in acute distress.  Pulmonary:      Effort: Pulmonary effort is normal. No respiratory distress.   Abdominal:      General: There is no distension.      Palpations: Abdomen is soft.      Tenderness: There is no abdominal tenderness. There is no guarding or rebound.   Skin:     Comments: Incisions are clean, dry and intact  There is no evidence of infection, hematoma or seroma    Neurological:      Mental Status: She is alert and oriented to person, place, and time.   Psychiatric:         Behavior: Behavior is cooperative.       Assessment/Plan:   Acute appendicitis with localized peritonitis, without perforation, abscess, or gangrene      S/p appendectomy. Path benign. Doing well. RTC PRN.

## 2023-02-08 ENCOUNTER — OFFICE VISIT (OUTPATIENT)
Dept: URGENT CARE | Facility: CLINIC | Age: 28
End: 2023-02-08
Payer: COMMERCIAL

## 2023-02-08 VITALS
SYSTOLIC BLOOD PRESSURE: 117 MMHG | HEIGHT: 72 IN | BODY MASS INDEX: 37 KG/M2 | TEMPERATURE: 98 F | OXYGEN SATURATION: 99 % | RESPIRATION RATE: 20 BRPM | HEART RATE: 99 BPM | DIASTOLIC BLOOD PRESSURE: 77 MMHG | WEIGHT: 273.19 LBS

## 2023-02-08 DIAGNOSIS — J02.0 STREP THROAT: ICD-10-CM

## 2023-02-08 DIAGNOSIS — J02.9 SORE THROAT: Primary | ICD-10-CM

## 2023-02-08 DIAGNOSIS — R05.9 COUGH, UNSPECIFIED TYPE: ICD-10-CM

## 2023-02-08 DIAGNOSIS — J06.9 VIRAL URI: ICD-10-CM

## 2023-02-08 LAB
CTP QC/QA: YES
CTP QC/QA: YES
S PYO RRNA THROAT QL PROBE: POSITIVE
SARS-COV-2 AG RESP QL IA.RAPID: NEGATIVE

## 2023-02-08 PROCEDURE — 87811 SARS-COV-2 COVID19 W/OPTIC: CPT | Mod: QW,S$GLB,,

## 2023-02-08 PROCEDURE — 99214 OFFICE O/P EST MOD 30 MIN: CPT | Mod: S$GLB,,,

## 2023-02-08 PROCEDURE — 87880 STREP A ASSAY W/OPTIC: CPT | Mod: QW,,,

## 2023-02-08 PROCEDURE — 87811 SARS CORONAVIRUS 2 ANTIGEN POCT, MANUAL READ: ICD-10-PCS | Mod: QW,S$GLB,,

## 2023-02-08 PROCEDURE — 87880 POCT RAPID STREP A: ICD-10-PCS | Mod: QW,,,

## 2023-02-08 PROCEDURE — 99214 PR OFFICE/OUTPT VISIT, EST, LEVL IV, 30-39 MIN: ICD-10-PCS | Mod: S$GLB,,,

## 2023-02-08 RX ORDER — AMOXICILLIN 500 MG/1
500 TABLET, FILM COATED ORAL EVERY 12 HOURS
Qty: 20 TABLET | Refills: 0 | Status: SHIPPED | OUTPATIENT
Start: 2023-02-08 | End: 2023-02-18

## 2023-02-08 RX ORDER — BENZONATATE 200 MG/1
200 CAPSULE ORAL 3 TIMES DAILY PRN
Qty: 15 CAPSULE | Refills: 0 | Status: SHIPPED | OUTPATIENT
Start: 2023-02-08 | End: 2023-02-13

## 2023-02-08 RX ORDER — AZELASTINE 1 MG/ML
1 SPRAY, METERED NASAL 2 TIMES DAILY
Qty: 30 ML | Refills: 0 | Status: SHIPPED | OUTPATIENT
Start: 2023-02-08 | End: 2023-02-15

## 2023-02-08 NOTE — PROGRESS NOTES
"Subjective:       Patient ID: Rina Hearn is a 27 y.o. female.    Vitals:  height is 6' (1.829 m) and weight is 123.9 kg (273 lb 3.2 oz). Her temperature is 97.8 °F (36.6 °C). Her blood pressure is 117/77 and her pulse is 99. Her respiration is 20 and oxygen saturation is 99%.     Chief Complaint: Sore Throat    Pt states" c/o migraine that radiates to neck, sore throat, fever, nausea that has been going on for 3 days. Took ibuprofen @ 7:45 a.m.."       Constitution: Negative for activity change, appetite change, chills, sweating and fever.   HENT:  Positive for congestion and sore throat. Negative for ear pain, sinus pain and sinus pressure.    Neck: Negative for neck pain.   Cardiovascular:  Negative for chest pain.   Eyes:  Negative for blurred vision.   Respiratory:  Positive for cough. Negative for chest tightness, sputum production and shortness of breath.    Gastrointestinal:  Negative for abdominal pain.   Neurological:  Positive for headaches. Negative for dizziness and history of vertigo.     Objective:      Physical Exam   Constitutional:  Non-toxic appearance. She does not appear ill. No distress.   HENT:   Head: Normocephalic.   Ears:   Right Ear: Tympanic membrane, external ear and ear canal normal.   Left Ear: Tympanic membrane, external ear and ear canal normal.   Nose: Nose normal.   Mouth/Throat: Uvula is midline. Mucous membranes are moist. No uvula swelling. No oropharyngeal exudate, posterior oropharyngeal erythema or tonsillar abscesses. Tonsils are 1+ on the right. Tonsils are 1+ on the left. No tonsillar exudate.   Eyes: Conjunctivae are normal. Pupils are equal, round, and reactive to light. Extraocular movement intact   Cardiovascular: Normal rate, normal heart sounds and normal pulses.   Pulmonary/Chest: Effort normal and breath sounds normal. No respiratory distress. She has no wheezes. She has no rhonchi.   Abdominal: Soft. There is no abdominal tenderness. There is no guarding. "   Neurological: no focal deficit. She is alert.   Skin: Skin is warm, dry and not diaphoretic. Capillary refill takes 2 to 3 seconds.   Psychiatric: Her behavior is normal. Mood normal.       Assessment:       1. Sore throat    2. Cough, unspecified type    3. Viral URI    4. Strep throat          Plan:         Sore throat  -     SARS Coronavirus 2 Antigen, POCT Manual Read  -     POCT rapid strep A    Cough, unspecified type  -     benzonatate (TESSALON) 200 MG capsule; Take 1 capsule (200 mg total) by mouth 3 (three) times daily as needed for Cough.  Dispense: 15 capsule; Refill: 0    Viral URI  -     azelastine (ASTELIN) 137 mcg (0.1 %) nasal spray; 1 spray (137 mcg total) by Nasal route 2 (two) times daily. for 7 days  Dispense: 30 mL; Refill: 0    Strep throat  -     amoxicillin (AMOXIL) 500 MG Tab; Take 1 tablet (500 mg total) by mouth every 12 (twelve) hours. for 10 days  Dispense: 20 tablet; Refill: 0         Pt presents with sore throat and cough, covid negative, declined flu, strep positive, pt tolerating po, lungs clear, denies sob or cp, neuro intact, denies worst headache of her life, denies allergies, will cover with amoxicillin, pt instructed to fu with pcp if symptoms persist.

## 2023-02-08 NOTE — LETTER
February 8, 2023      [facility]  [address]  [place] [address]  Phone: [phone]       Patient: Rina Hearn   YOB: 1995  Date of Visit: 02/08/2023    To Whom It May Concern:    Asa Hearn  was at [facility] on 02/08/2023. The patient may return to work/school on 02/10/2023 if fever free for greater than 24 hours without antipyretics and symptoms resolving.. If you have any questions or concerns, or if I can be of further assistance, please do not hesitate to contact me.    Sincerely,    Joel Clemente, NP

## 2023-02-08 NOTE — PATIENT INSTRUCTIONS
Symptomatic treatment to include:    Rest, increase fluid intake to include electrolyte replacement  Ibuprofen/Tylenol as directed for fever, sore throat, body aches  Zrytec and flonase for sinus symptoms  Tessalon perles cough pills as needed day or night  Mucinex D over the counter as directed for sinus congestion.  Coricidin HBP if you have high blood pressure.  Warm, salt water gargles, over the counter throat lozenges or sprays as desires.   ER for difficulty breathing not relieved by rest, excessive lethargy and/or change in mental status, oxygen level less than 93% or worsening of symptoms.   Follow up with PCP if symptoms persist.   Take antibiotics with food.   Change tooth brush after four days of antibiotics.

## 2023-03-09 LAB
C TRACH RRNA SPEC QL PROBE: NEGATIVE
GONORRHEA: NEGATIVE
HIV 1+2 AB+HIV1 P24 AG SERPL QL IA: NEGATIVE

## 2023-06-25 ENCOUNTER — HOSPITAL ENCOUNTER (EMERGENCY)
Facility: HOSPITAL | Age: 28
Discharge: HOME | End: 2023-06-25
Attending: EMERGENCY MEDICINE
Payer: COMMERCIAL

## 2023-06-25 VITALS
TEMPERATURE: 98 F | HEART RATE: 109 BPM | OXYGEN SATURATION: 98 % | RESPIRATION RATE: 17 BRPM | DIASTOLIC BLOOD PRESSURE: 81 MMHG | BODY MASS INDEX: 37.3 KG/M2 | SYSTOLIC BLOOD PRESSURE: 132 MMHG | WEIGHT: 275 LBS

## 2023-06-25 DIAGNOSIS — Z34.92 SECOND TRIMESTER PREGNANCY: ICD-10-CM

## 2023-06-25 DIAGNOSIS — L29.9 PRURITUS: ICD-10-CM

## 2023-06-25 DIAGNOSIS — F41.0 ANXIETY ATTACK: ICD-10-CM

## 2023-06-25 DIAGNOSIS — L55.0 SUNBURN OF FIRST DEGREE: Primary | ICD-10-CM

## 2023-06-25 PROCEDURE — 25000003 PHARM REV CODE 250: Performed by: EMERGENCY MEDICINE

## 2023-06-25 PROCEDURE — 63600175 PHARM REV CODE 636 W HCPCS: Performed by: EMERGENCY MEDICINE

## 2023-06-25 PROCEDURE — 99284 EMERGENCY DEPT VISIT MOD MDM: CPT

## 2023-06-25 PROCEDURE — 96372 THER/PROPH/DIAG INJ SC/IM: CPT | Performed by: EMERGENCY MEDICINE

## 2023-06-25 RX ORDER — DIPHENHYDRAMINE HYDROCHLORIDE 50 MG/ML
50 INJECTION INTRAMUSCULAR; INTRAVENOUS
Status: COMPLETED | OUTPATIENT
Start: 2023-06-25 | End: 2023-06-25

## 2023-06-25 RX ORDER — ACETAMINOPHEN 500 MG
1000 TABLET ORAL
Status: COMPLETED | OUTPATIENT
Start: 2023-06-25 | End: 2023-06-25

## 2023-06-25 RX ORDER — DIPHENHYDRAMINE HYDROCHLORIDE 50 MG/ML
12.5 INJECTION INTRAMUSCULAR; INTRAVENOUS
Status: DISCONTINUED | OUTPATIENT
Start: 2023-06-25 | End: 2023-06-25

## 2023-06-25 RX ORDER — ONDANSETRON 4 MG/1
4 TABLET, ORALLY DISINTEGRATING ORAL
Status: COMPLETED | OUTPATIENT
Start: 2023-06-25 | End: 2023-06-25

## 2023-06-25 RX ORDER — METOCLOPRAMIDE HYDROCHLORIDE 5 MG/ML
10 INJECTION INTRAMUSCULAR; INTRAVENOUS
Status: DISCONTINUED | OUTPATIENT
Start: 2023-06-25 | End: 2023-06-25

## 2023-06-25 RX ORDER — PROMETHAZINE HYDROCHLORIDE 25 MG/1
12.5 TABLET ORAL EVERY 6 HOURS PRN
Qty: 15 TABLET | Refills: 0 | Status: SHIPPED | OUTPATIENT
Start: 2023-06-25 | End: 2023-06-30

## 2023-06-25 RX ADMIN — ONDANSETRON 4 MG: 4 TABLET, ORALLY DISINTEGRATING ORAL at 08:06

## 2023-06-25 RX ADMIN — DIPHENHYDRAMINE HYDROCHLORIDE 50 MG: 50 INJECTION INTRAMUSCULAR; INTRAVENOUS at 08:06

## 2023-06-25 RX ADMIN — ACETAMINOPHEN 1000 MG: 500 TABLET, FILM COATED ORAL at 08:06

## 2023-06-26 NOTE — FIRST PROVIDER EVALUATION
Medical screening examination initiated.  I have conducted a focused provider triage encounter, findings are as follows:    Brief history of present illness:  27-year-old 20 weeks pregnant.  Sunburn since Friday.  Nausea and vomiting since yesterday.  Nothing relieves the pain.    Vitals:    06/25/23 1910   BP: 132/81   BP Location: Left arm   Patient Position: Sitting   Pulse: 109   Resp: 17   Temp: 98.1 °F (36.7 °C)   TempSrc: Oral   SpO2: 98%   Weight: 124.7 kg (275 lb)       Pertinent physical exam:  Sunburn on shoulders and upper back.    Brief workup plan:  Labs, IV fluids    Preliminary workup initiated; this workup will be continued and followed by the physician or advanced practice provider that is assigned to the patient when roomed.

## 2023-06-26 NOTE — ED PROVIDER NOTES
Encounter Date: 2023       History     Chief Complaint   Patient presents with    Sunburn     Sunburn to upper back x 2 days. States it is painful and itching. States pain is so bad she has been vomiting. Patient states she is 20 weeks pregnant.  Dr. Eastman is her OBGYN     Emergent evaluation of a 27-year-old female who is 20 weeks pregnant developed a sunburn on Friday afternoon to her back and shoulders she reports a proximally 10 minutes ago she started having severe pain at the sunburn site and itching.  She took Benadryl but became nauseous and vomited.  She reports she suffers with anxiety and panic disorder and has developed a anxiety and panic attack.  She reports she did not take any pain medication they did try applying aloe vera.    Review of patient's allergies indicates:  No Known Allergies  Past Medical History:   Diagnosis Date    PCOS (polycystic ovarian syndrome)      Past Surgical History:   Procedure Laterality Date     SECTION N/A 02/10/2022    Procedure:  SECTION  (PRIMARY)   (BREECH);  Surgeon: Becca Eastman MD;  Location: [facility] L&D;  Service: OB/GYN;  Laterality: N/A;     SECTION      CHOLECYSTECTOMY      gastric sleeve      LAPAROSCOPIC APPENDECTOMY N/A 11/3/2022    Procedure: APPENDECTOMY, LAPAROSCOPIC;  Surgeon: Sanjay Lopez III, MD;  Location: [facility] OR;  Service: General;  Laterality: N/A;     History reviewed. No pertinent family history.  Social History     Tobacco Use    Smoking status: Never    Smokeless tobacco: Never   Substance Use Topics    Alcohol use: Not Currently    Drug use: Never     Review of Systems   Respiratory:  Negative for shortness of breath.    Cardiovascular:  Negative for chest pain.   Gastrointestinal:  Positive for nausea and vomiting.   Musculoskeletal:  Positive for myalgias.   Skin:  Positive for color change. Negative for pallor, rash and wound.   Allergic/Immunologic: Negative for immunocompromised state.   Hematological:   Does not bruise/bleed easily.   Psychiatric/Behavioral:  Positive for agitation. Negative for behavioral problems and confusion. The patient is nervous/anxious.    All other systems reviewed and are negative.    Physical Exam     Initial Vitals [06/25/23 1910]   BP Pulse Resp Temp SpO2   132/81 109 17 98.1 °F (36.7 °C) 98 %      MAP       --         Physical Exam    Nursing note and vitals reviewed.  Constitutional: She appears well-developed and well-nourished. She is not diaphoretic. No distress.   HENT:   Head: Normocephalic.   Cardiovascular:            Heart rate 109 blood pressure 132/81   Pulmonary/Chest: Breath sounds normal. No respiratory distress. She has no wheezes. She has no rhonchi. She has no rales.   Sats 98% on room air respirations 17   Musculoskeletal:         General: Tenderness present.     Neurological: She is alert and oriented to person, place, and time. She has normal strength. GCS score is 15. GCS eye subscore is 4. GCS verbal subscore is 5. GCS motor subscore is 6.   Skin: Skin is warm and dry. Burn noted. No abrasion, no bruising, no ecchymosis, no laceration, no lesion, no petechiae, no purpura, no rash and no abscess noted. Rash is not macular, not papular, not maculopapular, not nodular, not pustular, not vesicular and not urticarial. There is erythema. No pallor.        Patient has a first-degree sunburn to the back.  No signs of second-degree sunburn no signs of cellulitis no open wounds.  No crepitus or bruising.  Sunburn is well delineated with her clothing lines.     Psychiatric:   Patient is very anxious and upset crying       ED Course   Procedures  Labs Reviewed - No data to display       Imaging Results    None          Medications   diphenhydrAMINE injection 50 mg (50 mg Intramuscular Given 6/25/23 2013)   ondansetron disintegrating tablet 4 mg (4 mg Oral Given 6/25/23 2013)   acetaminophen tablet 1,000 mg (1,000 mg Oral Given 6/25/23 2013)     Medical Decision Making:   ED  Management:  Emergent evaluation of a 27-year-old female who is 20 weeks pregnant developed a sunburn on Friday afternoon to her back and shoulders she reports a proximally 10 minutes ago she started having severe pain at the sunburn site and itching.  She took Benadryl but became nauseous and vomited.  She reports she suffers with anxiety and panic disorder and has developed a anxiety and panic attack.  She reports she did not take any pain medication they did try applying aloe vera.  On physical exam patient has a first-degree sunburn to her shoulders and upper back with no signs of second-degree sunburn.  No signs of secondary infection.  Her  is lightly rubbing her back which she reports improves the pain.  She is crying and very upset.  No chest pain or shortness of breath.  She denies any abdominal pain.  Mild ongoing nausea.  No further vomiting  [facility]    Patient presents for emergent evaluation of acute sunburn to back causing increased pain and itching for the past 10 minutes that poses a threat to life and/or bodily function.   Differential diagnosis includes but was not limited to sun poisoning, 1st or second-degree sunburn, second-degree bacterial infection, necrotizing fasciitis, staph scalded skin syndrome, TEN, SJS, anxiety and panic disorder.   In the ED patient found to have acute first-degree sunburn with pruritus causing anxiety reaction.        Discharge [facility]     Patient was managed in the ED with IM Benadryl 50 mg, Zofran 4 mg orally and Tylenol for pain.  As well as cool compresses.  The response to treatment was good   Patient was discharged in stable condition.  Detailed return precautions discussed.  Patient was told to follow up with primary care physician or specialist based on their diagnosis  Jessie Avila MD                          Clinical Impression:   Final diagnoses:  [L55.0] Sunburn of first degree (Primary)  [F41.0] Anxiety attack  [L29.9] Pruritus  [Z34.92] Second  trimester pregnancy        ED Disposition Condition    Discharge Stable          ED Prescriptions       Medication Sig Dispense Start Date End Date Auth. Provider    promethazine (PHENERGAN) 25 MG tablet Take 0.5 tablets (12.5 mg total) by mouth every 6 (six) hours as needed for Nausea. 15 tablet 6/25/2023 6/30/2023 Jessie Avila MD          Follow-up Information       Follow up With Specialties Details Why Contact Info Additional Information    [facility] - Emergency Dept Emergency Medicine Go to  If symptoms worsen [address] [facility] [id number] [phone] 1st floor    Your OBGYN   As scheduled               Jessie Avila MD  06/25/23 2042

## 2023-06-26 NOTE — DISCHARGE INSTRUCTIONS
Please take Benadryl 50 mg every 6 hours as needed for itching and pain, take Tylenol 650 mg by mouth every 4 hours for pain, Zofran as prescribed, and use elevated and lotions to go back

## 2023-08-17 ENCOUNTER — OFFICE VISIT (OUTPATIENT)
Dept: URGENT CARE | Facility: CLINIC | Age: 28
End: 2023-08-17
Payer: COMMERCIAL

## 2023-08-17 VITALS
BODY MASS INDEX: 39.28 KG/M2 | HEIGHT: 72 IN | RESPIRATION RATE: 20 BRPM | TEMPERATURE: 98 F | WEIGHT: 290 LBS | OXYGEN SATURATION: 99 % | SYSTOLIC BLOOD PRESSURE: 138 MMHG | HEART RATE: 98 BPM | DIASTOLIC BLOOD PRESSURE: 81 MMHG

## 2023-08-17 DIAGNOSIS — Z20.822 COVID-19 VIRUS NOT DETECTED: ICD-10-CM

## 2023-08-17 DIAGNOSIS — J02.9 VIRAL PHARYNGITIS: ICD-10-CM

## 2023-08-17 DIAGNOSIS — J06.9 VIRAL URI: Primary | ICD-10-CM

## 2023-08-17 DIAGNOSIS — Z3A.27 27 WEEKS GESTATION OF PREGNANCY: ICD-10-CM

## 2023-08-17 LAB
CTP QC/QA: YES
CTP QC/QA: YES
S PYO RRNA THROAT QL PROBE: NEGATIVE
SARS-COV-2 AG RESP QL IA.RAPID: NEGATIVE

## 2023-08-17 PROCEDURE — 87811 SARS CORONAVIRUS 2 ANTIGEN POCT, MANUAL READ: ICD-10-PCS | Mod: QW,S$GLB,, | Performed by: NURSE PRACTITIONER

## 2023-08-17 PROCEDURE — 87880 POCT RAPID STREP A: ICD-10-PCS | Mod: QW,,, | Performed by: NURSE PRACTITIONER

## 2023-08-17 PROCEDURE — 87811 SARS-COV-2 COVID19 W/OPTIC: CPT | Mod: QW,S$GLB,, | Performed by: NURSE PRACTITIONER

## 2023-08-17 PROCEDURE — 87880 STREP A ASSAY W/OPTIC: CPT | Mod: QW,,, | Performed by: NURSE PRACTITIONER

## 2023-08-17 PROCEDURE — 99214 PR OFFICE/OUTPT VISIT, EST, LEVL IV, 30-39 MIN: ICD-10-PCS | Mod: S$GLB,,, | Performed by: NURSE PRACTITIONER

## 2023-08-17 PROCEDURE — 99214 OFFICE O/P EST MOD 30 MIN: CPT | Mod: S$GLB,,, | Performed by: NURSE PRACTITIONER

## 2023-08-17 RX ORDER — LEVOCETIRIZINE DIHYDROCHLORIDE 5 MG/1
5 TABLET, FILM COATED ORAL NIGHTLY PRN
Qty: 7 TABLET | Refills: 0 | Status: SHIPPED | OUTPATIENT
Start: 2023-08-17 | End: 2024-02-20

## 2023-08-17 RX ORDER — FLUTICASONE PROPIONATE 50 MCG
2 SPRAY, SUSPENSION (ML) NASAL DAILY PRN
Qty: 15.8 ML | Refills: 0 | Status: SHIPPED | OUTPATIENT
Start: 2023-08-17 | End: 2023-08-31

## 2023-08-17 RX ORDER — AZELASTINE 1 MG/ML
1 SPRAY, METERED NASAL 2 TIMES DAILY PRN
Qty: 30 ML | Refills: 0 | Status: ON HOLD | OUTPATIENT
Start: 2023-08-17 | End: 2023-11-11

## 2023-08-17 NOTE — PATIENT INSTRUCTIONS
Increase clear fluid intake  Stop all current over the counter cough, cold, flu medicine  Tylenol/motrin otc for fever or pain  May alternate Flonase and Astelin nasal spray and take Xyzal tablets for sinus congestion and pressure.    Add a humidifier to your room at bedtime for respiratory comfort.  Saltwater gargles 4 x daily and benzocaine anesthetic throat lozenges for sore throat. May also add honey based cough syrup  Follow up with PCP  Go immediately to the nearest emergency room for shortness of breath, chest pain,  or other emergent concern.  Return to clinic for new, worse, or unresolving symptoms

## 2023-08-17 NOTE — PROGRESS NOTES
Subjective:      Patient ID: Rina Hearn is a 27 y.o. female.    Vitals:  height is 6' (1.829 m) and weight is 131.5 kg (290 lb). Her oral temperature is 98 °F (36.7 °C). Her blood pressure is 138/81 and her pulse is 98. Her respiration is 20 and oxygen saturation is 99%.     Chief Complaint: Sinus Problem    27-year-old female seen for sinus congestion and laryngitis.  States 4 days ago she began to develop a sore throat and sinus congestion.  States symptoms worsened and now she has lost her voice.    Sinus Problem  This is a new problem. Episode onset: 4 days ago. The problem has been gradually worsening since onset. There has been no fever. Associated symptoms include chills, congestion, headaches, a hoarse voice, sinus pressure, sneezing, a sore throat and swollen glands. Pertinent negatives include no coughing, ear pain or shortness of breath. (Chills/sweats  ) Treatments tried: Mucinex. The treatment provided no relief.       Constitution: Positive for chills. Negative for fever.   HENT:  Positive for congestion, sinus pressure, sore throat and voice change. Negative for ear pain and trouble swallowing.    Neck: Negative for painful lymph nodes.   Respiratory:  Negative for cough, shortness of breath and stridor.    Gastrointestinal:  Negative for nausea and vomiting.   Musculoskeletal:  Negative for muscle ache.   Skin:  Negative for rash.   Allergic/Immunologic: Positive for sneezing.   Neurological:  Positive for headaches. Negative for dizziness, light-headedness, passing out, disorientation and altered mental status.   Hematologic/Lymphatic: Negative for swollen lymph nodes.   Psychiatric/Behavioral:  Negative for altered mental status, disorientation and confusion.       Objective:     Physical Exam   Constitutional: She is oriented to person, place, and time. She appears well-developed. She is cooperative.  Non-toxic appearance. She does not appear ill. No distress.   HENT:   Head: Normocephalic and  Visit Vitals  /65 (BP 1 Location: Left arm, BP Patient Position: Sitting)   Pulse 66   Resp 16   Ht 6' 1\" (1.854 m)   Wt 181 lb (82.1 kg)   SpO2 99%   BMI 23.88 kg/m² atraumatic.   Ears:   Right Ear: Hearing and external ear normal.   Left Ear: Hearing and external ear normal.   Nose: Congestion present. No mucosal edema, rhinorrhea or nasal deformity. No epistaxis. Right sinus exhibits no maxillary sinus tenderness and no frontal sinus tenderness. Left sinus exhibits no maxillary sinus tenderness and no frontal sinus tenderness.   Mouth/Throat: Uvula is midline and mucous membranes are normal. Mucous membranes are moist. No trismus in the jaw. Normal dentition. No uvula swelling. Posterior oropharyngeal erythema present. No oropharyngeal exudate, posterior oropharyngeal edema, tonsillar abscesses or cobblestoning. Tonsils are 1+ on the right. Tonsils are 1+ on the left. No tonsillar exudate.   Eyes: Conjunctivae and lids are normal. No scleral icterus.   Neck: Trachea normal and phonation normal. Neck supple. No edema present. No erythema present. No neck rigidity present.   Cardiovascular: Normal rate, regular rhythm, normal heart sounds and normal pulses.   Pulmonary/Chest: Effort normal and breath sounds normal. No respiratory distress. She has no decreased breath sounds. She has no rhonchi.   Abdominal: Normal appearance.   Musculoskeletal: Normal range of motion.         General: No deformity. Normal range of motion.   Lymphadenopathy:     She has cervical adenopathy (Left anterior cervical).   Neurological: no focal deficit. She is alert and oriented to person, place, and time. She exhibits normal muscle tone. Coordination normal.   Skin: Skin is warm, dry, intact, not diaphoretic and not pale. Capillary refill takes 2 to 3 seconds.   Psychiatric: Her speech is normal and behavior is normal. Judgment and thought content normal.   Nursing note and vitals reviewed.      Assessment:     1. Viral URI    2. COVID-19 virus not detected    3. 27 weeks gestation of pregnancy    4. Viral pharyngitis        Plan:       Viral URI  -     SARS Coronavirus 2 Antigen, POCT Manual Read  -      azelastine (ASTELIN) 137 mcg (0.1 %) nasal spray; 1 spray (137 mcg total) by Nasal route 2 (two) times daily as needed for Rhinitis.  Dispense: 30 mL; Refill: 0  -     benzocaine-menthoL 6-10 mg lozenge; Take 1 lozenge by mouth every 2 (two) hours as needed (Sore Throat).  Dispense: 18 tablet; Refill: 0  -     levocetirizine (XYZAL) 5 MG tablet; Take 1 tablet (5 mg total) by mouth nightly as needed for Allergies.  Dispense: 7 tablet; Refill: 0  -     fluticasone propionate (FLONASE) 50 mcg/actuation nasal spray; 2 sprays (100 mcg total) by Each Nostril route daily as needed for Rhinitis.  Dispense: 15.8 mL; Refill: 0  -     POCT rapid strep A    COVID-19 virus not detected    27 weeks gestation of pregnancy    Viral pharyngitis  -     benzocaine-menthoL 6-10 mg lozenge; Take 1 lozenge by mouth every 2 (two) hours as needed (Sore Throat).  Dispense: 18 tablet; Refill: 0    Strep negative    I have discussed the test results and physical exam findings with the patient. We discussed symptom monitoring, conservative care methods, medication use, and follow up orders. The patient verbalized understanding and agreement with the plan of care.

## 2023-08-17 NOTE — LETTER
August 17, 2023      [facility]  [address] [address]  Phone: [phone]       Patient: Rina Hearn   YOB: 1995  Date of Visit: 08/17/2023    To Whom It May Concern:    Asa Hearn  was at [facility] on 08/17/2023. The patient may return to work/school on 08/21/2023 with no restrictions. If you have any questions or concerns, or if I can be of further assistance, please do not hesitate to contact me.    Sincerely,    Gregg Al Jr., FNP-C

## 2023-09-01 ENCOUNTER — OFFICE VISIT (OUTPATIENT)
Dept: URGENT CARE | Facility: CLINIC | Age: 28
End: 2023-09-01
Payer: COMMERCIAL

## 2023-09-01 VITALS
DIASTOLIC BLOOD PRESSURE: 79 MMHG | TEMPERATURE: 97 F | RESPIRATION RATE: 20 BRPM | OXYGEN SATURATION: 100 % | WEIGHT: 291.19 LBS | HEIGHT: 72 IN | HEART RATE: 91 BPM | SYSTOLIC BLOOD PRESSURE: 127 MMHG | BODY MASS INDEX: 39.44 KG/M2

## 2023-09-01 DIAGNOSIS — R00.2 PALPITATION: Primary | ICD-10-CM

## 2023-09-01 DIAGNOSIS — F41.9 ANXIETY: ICD-10-CM

## 2023-09-01 PROCEDURE — 99214 PR OFFICE/OUTPT VISIT, EST, LEVL IV, 30-39 MIN: ICD-10-PCS | Mod: S$GLB,,,

## 2023-09-01 PROCEDURE — 99214 OFFICE O/P EST MOD 30 MIN: CPT | Mod: S$GLB,,,

## 2023-09-01 NOTE — PROGRESS NOTES
Subjective:      Patient ID: Rina Hearn is a 27 y.o. female.    Vitals:  height is 6' (1.829 m) and weight is 132.1 kg (291 lb 3.2 oz). Her oral temperature is 97 °F (36.1 °C). Her blood pressure is 127/79 and her pulse is 91. Her respiration is 20 and oxygen saturation is 100%.     Chief Complaint: Palpitations    Rina, , currently second-trimester pregnancy, presents to clinic with a chief complaint of palpitations, tachycardia, shortness of breath, anxious, dizziness, and weakness that happened while at work.  While at work her symptoms progressively worsened.  She went to school nurse, who performed in the physical exam noted patient to have a heart rate of 115.  She stated her heart rate was irregular.  Currently denies feeling symptoms.  She states that she frequently will get anxious.  She also reports she called her OB, who has instructed her for bedrest.  Since waiting in room, she says all of her symptoms have not resolved.  She denies any drug use, excessive caffeine use, stimulants, or other known causes tachycardia, and anxiety.  Workup offered to patient, and refused.  AMA signed    Palpitations   This is a new problem. The current episode started today. The problem has been gradually worsening. Associated symptoms include anxiety, chest pain, dizziness, an irregular heartbeat and shortness of breath. Pertinent negatives include no nausea or vomiting. She has tried deep relaxation for the symptoms. Her past medical history is significant for anxiety.       Constitution: Positive for fatigue.   HENT:  Negative for congestion, postnasal drip and sore throat.    Neck: Negative for painful lymph nodes.   Cardiovascular:  Positive for chest pain and palpitations.   Eyes:  Negative for double vision and blurred vision.   Respiratory:  Positive for shortness of breath.    Gastrointestinal:  Negative for nausea and vomiting.   Endocrine: heat intolerance.   Genitourinary:  Negative for dysuria and  frequency.   Musculoskeletal:  Negative for pain.   Skin:  Negative for rash.   Allergic/Immunologic: Negative for environmental allergies and seasonal allergies.   Neurological:  Positive for dizziness.   Hematologic/Lymphatic: Negative for swollen lymph nodes.   Psychiatric/Behavioral:  Positive for nervous/anxious. The patient is nervous/anxious.       Objective:     Physical Exam   Constitutional: She is oriented to person, place, and time. She appears well-developed. She is cooperative.  Non-toxic appearance. She does not appear ill. No distress. obesity  HENT:   Head: Normocephalic and atraumatic.   Ears:   Right Ear: Hearing, tympanic membrane, external ear and ear canal normal.   Left Ear: Hearing, tympanic membrane, external ear and ear canal normal.   Nose: Nose normal. No mucosal edema or nasal deformity. No epistaxis. Right sinus exhibits no maxillary sinus tenderness and no frontal sinus tenderness. Left sinus exhibits no maxillary sinus tenderness and no frontal sinus tenderness.   Mouth/Throat: Uvula is midline, oropharynx is clear and moist and mucous membranes are normal. Mucous membranes are moist. No trismus in the jaw. Normal dentition. No uvula swelling. No posterior oropharyngeal erythema. Oropharynx is clear.   Eyes: Conjunctivae and lids are normal. Pupils are equal, round, and reactive to light. Extraocular movement intact   Neck: Trachea normal and phonation normal. Neck supple.   Cardiovascular: Normal rate, regular rhythm, normal heart sounds and normal pulses.   Pulmonary/Chest: Effort normal and breath sounds normal. No respiratory distress.   Abdominal: Normal appearance. Soft. flat abdomen   Musculoskeletal: Normal range of motion.         General: Normal range of motion.   Lymphadenopathy:     She has no cervical adenopathy.   Neurological: no focal deficit. She is alert, oriented to person, place, and time and at baseline. She exhibits normal muscle tone.   Skin: Skin is warm, dry,  intact and not diaphoretic. Capillary refill takes 2 to 3 seconds.   Psychiatric: Her speech is normal and behavior is normal. Mood, judgment and thought content normal.   Nursing note and vitals reviewed.      Assessment:     1. Palpitation    2. Anxiety        Plan:       Palpitation    Anxiety    Follow up with gyn  Emergency room precautions

## 2023-09-03 LAB
ANTIBODY SCREEN: NEGATIVE
C TRACH RRNA SPEC QL PROBE: NEGATIVE
HBV SURFACE AG SERPL QL IA: NEGATIVE
HIV 1+2 AB+HIV1 P24 AG SERPL QL IA: NEGATIVE
N GONORRHOEAE, AMPLIFIED DNA: NEGATIVE
RPR: NONREACTIVE
RUBELLA IMMUNE STATUS: NORMAL

## 2023-09-19 ENCOUNTER — HOSPITAL ENCOUNTER (OUTPATIENT)
Facility: HOSPITAL | Age: 28
Discharge: HOME | End: 2023-09-19
Attending: OBSTETRICS & GYNECOLOGY | Admitting: OBSTETRICS & GYNECOLOGY
Payer: COMMERCIAL

## 2023-09-19 VITALS
OXYGEN SATURATION: 100 % | RESPIRATION RATE: 18 BRPM | DIASTOLIC BLOOD PRESSURE: 59 MMHG | TEMPERATURE: 98 F | SYSTOLIC BLOOD PRESSURE: 114 MMHG | HEART RATE: 88 BPM

## 2023-09-19 DIAGNOSIS — O36.8190 DECREASED FETAL MOVEMENT: ICD-10-CM

## 2023-09-19 LAB
ALBUMIN SERPL BCP-MCNC: 3 G/DL (ref 3.5–5.2)
ALP SERPL-CCNC: 70 U/L (ref 55–135)
ALT SERPL W/O P-5'-P-CCNC: 9 U/L (ref 10–44)
ANION GAP SERPL CALC-SCNC: 4 MMOL/L (ref 8–16)
AST SERPL-CCNC: 10 U/L (ref 10–40)
BACTERIA #/AREA URNS HPF: NEGATIVE /HPF
BILIRUB SERPL-MCNC: 0.8 MG/DL (ref 0.1–1)
BILIRUB UR QL STRIP: NEGATIVE
BUN SERPL-MCNC: 6 MG/DL (ref 6–20)
CALCIUM SERPL-MCNC: 8.5 MG/DL (ref 8.7–10.5)
CHLORIDE SERPL-SCNC: 108 MMOL/L (ref 95–110)
CLARITY UR: CLEAR
CO2 SERPL-SCNC: 24 MMOL/L (ref 23–29)
COLOR UR: YELLOW
CREAT SERPL-MCNC: 0.6 MG/DL (ref 0.5–1.4)
ERYTHROCYTE [DISTWIDTH] IN BLOOD BY AUTOMATED COUNT: 14.6 % (ref 11.5–14.5)
EST. GFR  (NO RACE VARIABLE): >60 ML/MIN/1.73 M^2
GLUCOSE SERPL-MCNC: 73 MG/DL (ref 70–110)
GLUCOSE UR QL STRIP: NEGATIVE
HCT VFR BLD AUTO: 28.3 % (ref 37–48.5)
HGB BLD-MCNC: 9.2 G/DL (ref 12–16)
HGB UR QL STRIP: NEGATIVE
HYALINE CASTS #/AREA URNS LPF: 10 /LPF
KETONES UR QL STRIP: NEGATIVE
LEUKOCYTE ESTERASE UR QL STRIP: ABNORMAL
MCH RBC QN AUTO: 26 PG (ref 27–31)
MCHC RBC AUTO-ENTMCNC: 32.5 G/DL (ref 32–36)
MCV RBC AUTO: 80 FL (ref 82–98)
MICROSCOPIC COMMENT: ABNORMAL
NITRITE UR QL STRIP: NEGATIVE
PH UR STRIP: 7 [PH] (ref 5–8)
PLATELET # BLD AUTO: 156 K/UL (ref 150–450)
PMV BLD AUTO: 10.7 FL (ref 9.2–12.9)
POTASSIUM SERPL-SCNC: 3.9 MMOL/L (ref 3.5–5.1)
PROT SERPL-MCNC: 6.4 G/DL (ref 6–8.4)
PROT UR QL STRIP: ABNORMAL
RBC # BLD AUTO: 3.54 M/UL (ref 4–5.4)
RBC #/AREA URNS HPF: 2 /HPF (ref 0–4)
SODIUM SERPL-SCNC: 136 MMOL/L (ref 136–145)
SP GR UR STRIP: 1.01 (ref 1–1.03)
SQUAMOUS #/AREA URNS HPF: 13 /HPF
URN SPEC COLLECT METH UR: ABNORMAL
UROBILINOGEN UR STRIP-ACNC: NEGATIVE EU/DL
WBC # BLD AUTO: 7.48 K/UL (ref 3.9–12.7)
WBC #/AREA URNS HPF: 11 /HPF (ref 0–5)

## 2023-09-19 PROCEDURE — 87086 URINE CULTURE/COLONY COUNT: CPT | Performed by: OBSTETRICS & GYNECOLOGY

## 2023-09-19 PROCEDURE — 80053 COMPREHEN METABOLIC PANEL: CPT | Performed by: OBSTETRICS & GYNECOLOGY

## 2023-09-19 PROCEDURE — 81001 URINALYSIS AUTO W/SCOPE: CPT | Performed by: OBSTETRICS & GYNECOLOGY

## 2023-09-19 PROCEDURE — 87147 CULTURE TYPE IMMUNOLOGIC: CPT | Performed by: OBSTETRICS & GYNECOLOGY

## 2023-09-19 PROCEDURE — 36415 COLL VENOUS BLD VENIPUNCTURE: CPT | Performed by: OBSTETRICS & GYNECOLOGY

## 2023-09-19 PROCEDURE — 85027 COMPLETE CBC AUTOMATED: CPT | Performed by: OBSTETRICS & GYNECOLOGY

## 2023-09-19 PROCEDURE — 25000003 PHARM REV CODE 250: Performed by: OBSTETRICS & GYNECOLOGY

## 2023-09-19 PROCEDURE — 99211 OFF/OP EST MAY X REQ PHY/QHP: CPT

## 2023-09-19 RX ORDER — ACETAMINOPHEN 500 MG
1000 TABLET ORAL ONCE
Status: COMPLETED | OUTPATIENT
Start: 2023-09-19 | End: 2023-09-19

## 2023-09-19 RX ADMIN — ACETAMINOPHEN 1000 MG: 500 TABLET ORAL at 03:09

## 2023-09-19 NOTE — NURSING
[facility]  Department of Obstetrics and Gynecology  Labor & Delivery Triage Assessment    PATIENT NAME: Rina Hearn  MRN: [id number]  TODAY'S DATE: 2023    CHIEF COMPLAINT: headache and decreased fetal movement, swelling in bilateral lower extremities and feet, elevated b/p at home.    OB History    Para Term  AB Living   3 2 2 0 0 2   SAB IAB Ectopic Multiple Live Births   0 0 0 0 2      # Outcome Date GA Lbr Polo/2nd Weight Sex Delivery Anes PTL Lv   3 Current            2 Term 02/10/22 39w2d  3.64 kg (8 lb 0.4 oz) M CS-LTranv Spinal, EPI  SANDRA      Name: RAFAEL,BOY RINA      Apgar1: 8  Apgar5: 9   1 Term 04/15/20   3.487 kg (7 lb 11 oz) F Vag-Spont EPI N SANDRA     Past Medical History:   Diagnosis Date    PCOS (polycystic ovarian syndrome)      Past Surgical History:   Procedure Laterality Date     SECTION N/A 02/10/2022    Procedure:  SECTION  (PRIMARY)   (BREECH);  Surgeon: Becca Eastman MD;  Location: [facility] L&D;  Service: OB/GYN;  Laterality: N/A;    CHOLECYSTECTOMY  2019    gastric sleeve  2018    LAPAROSCOPIC APPENDECTOMY N/A 2022    Procedure: APPENDECTOMY, LAPAROSCOPIC;  Surgeon: Sanjay Lopez III, MD;  Location: [facility] OR;  Service: General;  Laterality: N/A;         VITAL SIGNS - ABNORMAL VITALS INCLUDE TEMP >100.4,RR <12 or >26, SUSTAINED MATERNAL PULSE <60 or >120     VITAL SIGNS (Most Recent)  Pulse: 88 (23 1503)  Resp: 18 (23 1503)  BP: 118/67 (23 1503)    VITAL SIGNS     normal  HEADACHE    yes     VOMITING    no  VISUAL DISTURBANCES  no  EPIGASTRIC PAIN        no  PROTEINURIA 2+ or MORE             no   EDEMA FACE/EXTREMITIES            no    FETAL MOVEMENT     FETAL MOVEMENT: Decreased  FETAL HEART RATE BASELINE =  120  normal  FETAL HEART RATE VARIABILITY:  Moderate  FETAL HEART RATE ACCELERATIONS FOR GESTATIONAL AGE: present  FETAL HEART RATE DECELERATIONS: none    ABDOMINAL PAIN/CRAMPING/CONTRACTIONS     Patient is  not complaining of abdominal pain/cramping/contractions.    RUPTURE OF MEMBRANES OR LEAKING OF AMNIOTIC FLUID     Patient denies ROM or leaking of amniotic fluid.    VAGINAL BLEEDING     Patient denies vaginal bleeding.    VAGINAL EXAM     DILATION:  -  STATION:  -  EFFACEMENT:  -  PRESENTATION:  -    VAGINAL EXAM DEFERRED DUE TO:  Not in Labor    PAIN PRESENT ON ARRIVAL     ONSET:   9/18 1530  LOCATION:  headache  PAIN SCALE (0-10):  7  DESCRIPTION: Constant and all over head    Interventions     Acetaminophen given.      PATIENT DISPOSITION     Discharged Home      Dr. Eastman notified at 1451 of the above assessment. Cbc, cmp, ua and tylenol ordered.  1708--Dr Eastman informed of cbc and cmp results and headache is a 5 from 7 after tylenol. May d/c home if headache worsens needs to go to the ER to be evaluated.    Michelle Gabriel RN  [facility]  09/19/2023

## 2023-09-20 LAB
BACTERIA UR CULT: ABNORMAL
PRENATAL STREP B CULTURE: POSITIVE

## 2023-09-27 ENCOUNTER — HOSPITAL ENCOUNTER (OUTPATIENT)
Facility: HOSPITAL | Age: 28
Discharge: HOME | End: 2023-09-27
Attending: OBSTETRICS & GYNECOLOGY | Admitting: STUDENT IN AN ORGANIZED HEALTH CARE EDUCATION/TRAINING PROGRAM
Payer: COMMERCIAL

## 2023-09-27 VITALS
WEIGHT: 285 LBS | RESPIRATION RATE: 17 BRPM | HEART RATE: 101 BPM | DIASTOLIC BLOOD PRESSURE: 58 MMHG | TEMPERATURE: 99 F | OXYGEN SATURATION: 100 % | SYSTOLIC BLOOD PRESSURE: 123 MMHG | BODY MASS INDEX: 39.9 KG/M2 | HEIGHT: 71 IN

## 2023-09-27 DIAGNOSIS — R10.9 ABDOMINAL CRAMPING: ICD-10-CM

## 2023-09-27 PROBLEM — N39.0 UTI (URINARY TRACT INFECTION): Status: ACTIVE | Noted: 2023-09-27

## 2023-09-27 LAB
ABO GROUP BLD: NORMAL
ALBUMIN SERPL BCP-MCNC: 3.4 G/DL (ref 3.5–5.2)
ALP SERPL-CCNC: 75 U/L (ref 55–135)
ALT SERPL W/O P-5'-P-CCNC: 5 U/L (ref 10–44)
ANION GAP SERPL CALC-SCNC: 10 MMOL/L (ref 8–16)
AST SERPL-CCNC: 8 U/L (ref 10–40)
BASOPHILS # BLD AUTO: 0.02 K/UL (ref 0–0.2)
BASOPHILS NFR BLD: 0.2 % (ref 0–1.9)
BILIRUB SERPL-MCNC: 0.5 MG/DL (ref 0.1–1)
BLD GP AB SCN CELLS X3 SERPL QL: NORMAL
BUN SERPL-MCNC: 7 MG/DL (ref 6–20)
CALCIUM SERPL-MCNC: 8.6 MG/DL (ref 8.7–10.5)
CHLORIDE SERPL-SCNC: 108 MMOL/L (ref 95–110)
CO2 SERPL-SCNC: 20 MMOL/L (ref 23–29)
CREAT SERPL-MCNC: 0.5 MG/DL (ref 0.5–1.4)
DIFFERENTIAL METHOD: ABNORMAL
EOSINOPHIL # BLD AUTO: 0 K/UL (ref 0–0.5)
EOSINOPHIL NFR BLD: 0.2 % (ref 0–8)
ERYTHROCYTE [DISTWIDTH] IN BLOOD BY AUTOMATED COUNT: 15.2 % (ref 11.5–14.5)
EST. GFR  (NO RACE VARIABLE): >60 ML/MIN/1.73 M^2
GLUCOSE SERPL-MCNC: 79 MG/DL (ref 70–110)
HCT VFR BLD AUTO: 28.3 % (ref 37–48.5)
HGB BLD-MCNC: 9.1 G/DL (ref 12–16)
IMM GRANULOCYTES # BLD AUTO: 0.07 K/UL (ref 0–0.04)
IMM GRANULOCYTES NFR BLD AUTO: 0.8 % (ref 0–0.5)
LYMPHOCYTES # BLD AUTO: 1.8 K/UL (ref 1–4.8)
LYMPHOCYTES NFR BLD: 21 % (ref 18–48)
MCH RBC QN AUTO: 25.3 PG (ref 27–31)
MCHC RBC AUTO-ENTMCNC: 32.2 G/DL (ref 32–36)
MCV RBC AUTO: 79 FL (ref 82–98)
MONOCYTES # BLD AUTO: 0.6 K/UL (ref 0.3–1)
MONOCYTES NFR BLD: 7.1 % (ref 4–15)
NEUTROPHILS # BLD AUTO: 6 K/UL (ref 1.8–7.7)
NEUTROPHILS NFR BLD: 70.7 % (ref 38–73)
NRBC BLD-RTO: 0 /100 WBC
PLATELET # BLD AUTO: 182 K/UL (ref 150–450)
PMV BLD AUTO: 10.7 FL (ref 9.2–12.9)
POTASSIUM SERPL-SCNC: 4 MMOL/L (ref 3.5–5.1)
PROT SERPL-MCNC: 6.2 G/DL (ref 6–8.4)
RBC # BLD AUTO: 3.6 M/UL (ref 4–5.4)
RH BLD: NORMAL
SODIUM SERPL-SCNC: 138 MMOL/L (ref 136–145)
WBC # BLD AUTO: 8.48 K/UL (ref 3.9–12.7)

## 2023-09-27 PROCEDURE — G0378 HOSPITAL OBSERVATION PER HR: HCPCS

## 2023-09-27 PROCEDURE — 63600175 PHARM REV CODE 636 W HCPCS: Performed by: STUDENT IN AN ORGANIZED HEALTH CARE EDUCATION/TRAINING PROGRAM

## 2023-09-27 PROCEDURE — 85025 COMPLETE CBC W/AUTO DIFF WBC: CPT | Performed by: STUDENT IN AN ORGANIZED HEALTH CARE EDUCATION/TRAINING PROGRAM

## 2023-09-27 PROCEDURE — 25000003 PHARM REV CODE 250: Performed by: STUDENT IN AN ORGANIZED HEALTH CARE EDUCATION/TRAINING PROGRAM

## 2023-09-27 PROCEDURE — 86850 RBC ANTIBODY SCREEN: CPT | Performed by: STUDENT IN AN ORGANIZED HEALTH CARE EDUCATION/TRAINING PROGRAM

## 2023-09-27 PROCEDURE — 25000003 PHARM REV CODE 250: Performed by: OBSTETRICS & GYNECOLOGY

## 2023-09-27 PROCEDURE — 86900 BLOOD TYPING SEROLOGIC ABO: CPT | Performed by: STUDENT IN AN ORGANIZED HEALTH CARE EDUCATION/TRAINING PROGRAM

## 2023-09-27 PROCEDURE — 86592 SYPHILIS TEST NON-TREP QUAL: CPT | Performed by: STUDENT IN AN ORGANIZED HEALTH CARE EDUCATION/TRAINING PROGRAM

## 2023-09-27 PROCEDURE — 99211 OFF/OP EST MAY X REQ PHY/QHP: CPT

## 2023-09-27 PROCEDURE — 80053 COMPREHEN METABOLIC PANEL: CPT | Performed by: STUDENT IN AN ORGANIZED HEALTH CARE EDUCATION/TRAINING PROGRAM

## 2023-09-27 PROCEDURE — 96365 THER/PROPH/DIAG IV INF INIT: CPT

## 2023-09-27 PROCEDURE — 86901 BLOOD TYPING SEROLOGIC RH(D): CPT | Performed by: STUDENT IN AN ORGANIZED HEALTH CARE EDUCATION/TRAINING PROGRAM

## 2023-09-27 RX ORDER — SODIUM CHLORIDE, SODIUM LACTATE, POTASSIUM CHLORIDE, CALCIUM CHLORIDE 600; 310; 30; 20 MG/100ML; MG/100ML; MG/100ML; MG/100ML
INJECTION, SOLUTION INTRAVENOUS CONTINUOUS
Status: DISCONTINUED | OUTPATIENT
Start: 2023-09-27 | End: 2023-09-28 | Stop reason: HOSPADM

## 2023-09-27 RX ORDER — PHENAZOPYRIDINE HYDROCHLORIDE 100 MG/1
100 TABLET, FILM COATED ORAL 3 TIMES DAILY PRN
Status: DISCONTINUED | OUTPATIENT
Start: 2023-09-27 | End: 2023-09-28 | Stop reason: HOSPADM

## 2023-09-27 RX ADMIN — SODIUM CHLORIDE, SODIUM LACTATE, POTASSIUM CHLORIDE, AND CALCIUM CHLORIDE 1000 ML: .6; .31; .03; .02 INJECTION, SOLUTION INTRAVENOUS at 07:09

## 2023-09-27 RX ADMIN — PHENAZOPYRIDINE 100 MG: 100 TABLET ORAL at 06:09

## 2023-09-27 RX ADMIN — SODIUM CHLORIDE, SODIUM LACTATE, POTASSIUM CHLORIDE, AND CALCIUM CHLORIDE: .6; .31; .03; .02 INJECTION, SOLUTION INTRAVENOUS at 08:09

## 2023-09-27 RX ADMIN — CEFTRIAXONE SODIUM 2 G: 2 INJECTION, POWDER, FOR SOLUTION INTRAMUSCULAR; INTRAVENOUS at 07:09

## 2023-09-27 NOTE — NURSING
[facility]  Department of Obstetrics and Gynecology  Labor & Delivery Triage Assessment    PATIENT NAME: Rina Hearn  MRN: [id number]  TODAY'S DATE: 2023    CHIEF COMPLAINT: Abdominal Cramping      OB History    Para Term  AB Living   3 2 2 0 0 2   SAB IAB Ectopic Multiple Live Births   0 0 0 0 2      # Outcome Date GA Lbr Polo/2nd Weight Sex Delivery Anes PTL Lv   3 Current            2 Term 02/10/22 39w2d  3.64 kg (8 lb 0.4 oz) M CS-LTranv Spinal, EPI  SANDRA      Name: NOEMY HALL      Apgar1: 8  Apgar5: 9   1 Term 04/15/20   3.487 kg (7 lb 11 oz) F Vag-Spont EPI N SANDRA     Past Medical History:   Diagnosis Date    PCOS (polycystic ovarian syndrome)      Past Surgical History:   Procedure Laterality Date     SECTION N/A 02/10/2022    Procedure:  SECTION  (PRIMARY)   (BREECH);  Surgeon: Becca Eastman MD;  Location: [facility] L&D;  Service: OB/GYN;  Laterality: N/A;    CHOLECYSTECTOMY  2019    gastric sleeve  2018    LAPAROSCOPIC APPENDECTOMY N/A 2022    Procedure: APPENDECTOMY, LAPAROSCOPIC;  Surgeon: Sanjay Lopez III, MD;  Location: [facility] OR;  Service: General;  Laterality: N/A;         VITAL SIGNS - ABNORMAL VITALS INCLUDE TEMP >100.4,RR <12 or >26, SUSTAINED MATERNAL PULSE <60 or >120     VITAL SIGNS (Most Recent)  Temp: 99 °F (37.2 °C) (23)  Resp: 18 (23)    VITAL SIGNS     normal  HEADACHE    no     VOMITING    no  VISUAL DISTURBANCES  no  EPIGASTRIC PAIN        no  PROTEINURIA 2+ or MORE             no   EDEMA FACE/EXTREMITIES            yes    FETAL MOVEMENT     FETAL MOVEMENT: Active  FETAL HEART RATE BASELINE =  120  normal  FETAL HEART RATE VARIABILITY:  Moderate  FETAL HEART RATE ACCELERATIONS FOR GESTATIONAL AGE: present  FETAL HEART RATE DECELERATIONS: none    ABDOMINAL PAIN/CRAMPING/CONTRACTIONS   Denies contractions. Reports lower abdominal cramps since 1500 today     RUPTURE OF MEMBRANES OR LEAKING OF AMNIOTIC  FLUID     Patient denies ROM or leaking of amniotic fluid.    VAGINAL BLEEDING     Patient denies vaginal bleeding.    VAGINAL EXAM     VAGINAL EXAM DEFERRED DUE TO:  Not in Labor    PAIN PRESENT ON ARRIVAL     ONSET:   1500 today  LOCATION:  Lower abd   PAIN SCALE (0-10):  7/10  DESCRIPTION: Cramping     Interventions   EFM, pyridium, CBC, CMP, Rocephin for UTI, Oral hydration     PATIENT DISPOSITION     Admit obs       Dr. Maddox notified at 1827 of the above assessment.    Swati Flores, RN  [facility]  09/27/2023

## 2023-09-28 DIAGNOSIS — Z98.891 PREVIOUS CESAREAN SECTION: Primary | ICD-10-CM

## 2023-09-28 LAB — RPR SER QL: NORMAL

## 2023-09-28 NOTE — NURSING
[facility]  Department of Obstetrics and Gynecology  Labor & Delivery Triage Assessment    PATIENT NAME: Rina Hearn  MRN: [id number]  TODAY'S DATE: 2023    CHIEF COMPLAINT: Abdominal Cramping      OB History    Para Term  AB Living   3 2 2 0 0 2   SAB IAB Ectopic Multiple Live Births   0 0 0 0 2      # Outcome Date GA Lbr Polo/2nd Weight Sex Delivery Anes PTL Lv   3 Current            2 Term 02/10/22 39w2d  3.64 kg (8 lb 0.4 oz) M CS-LTranv Spinal, EPI  SANDRA      Name: NOEMY HALL      Apgar1: 8  Apgar5: 9   1 Term 04/15/20   3.487 kg (7 lb 11 oz) F Vag-Spont EPI N SANDRA     Past Medical History:   Diagnosis Date    PCOS (polycystic ovarian syndrome)      Past Surgical History:   Procedure Laterality Date     SECTION N/A 02/10/2022    Procedure:  SECTION  (PRIMARY)   (BREECH);  Surgeon: Becca Eastman MD;  Location: [facility] L&D;  Service: OB/GYN;  Laterality: N/A;    CHOLECYSTECTOMY  2019    gastric sleeve  2018    LAPAROSCOPIC APPENDECTOMY N/A 2022    Procedure: APPENDECTOMY, LAPAROSCOPIC;  Surgeon: Sanjay Lopez III, MD;  Location: [facility] OR;  Service: General;  Laterality: N/A;         VITAL SIGNS - ABNORMAL VITALS INCLUDE TEMP >100.4,RR <12 or >26, SUSTAINED MATERNAL PULSE <60 or >120     VITAL SIGNS (Most Recent)  Temp: 98.5 °F (36.9 °C) (23)  Pulse: 101 (23)  Resp: 17 (23)  BP: (!) 123/58 (23)  SpO2: 100 % (23)    VITAL SIGNS     normal  HEADACHE    no     VOMITING    no  VISUAL DISTURBANCES  no  EPIGASTRIC PAIN        no  PROTEINURIA 2+ or MORE             Na    EDEMA FACE/EXTREMITIES            no    FETAL MOVEMENT     FETAL MOVEMENT: Active  FETAL HEART RATE BASELINE =  130  normal  FETAL HEART RATE VARIABILITY:  Moderate  FETAL HEART RATE ACCELERATIONS FOR GESTATIONAL AGE: present  FETAL HEART RATE DECELERATIONS: variable one noted     ABDOMINAL PAIN/CRAMPING/CONTRACTIONS     Patient is  "complaining of abdominal pain/cramping/contractions. For  patients  4/30min   contractions/hour. Contraction strength is mild. Resting tone is relaxed. Abdominal palpation is non-tender.    RUPTURE OF MEMBRANES OR LEAKING OF AMNIOTIC FLUID     Patient denies ROM or leaking of amniotic fluid.    VAGINAL BLEEDING     Patient denies vaginal bleeding.    VAGINAL EXAM       VAGINAL EXAM DEFERRED DUE TO:  Not in Labor    PAIN PRESENT ON ARRIVAL     ONSET:   X1 week   LOCATION:  Bladder   PAIN SCALE (0-10):  5  DESCRIPTION: "Shocking" pressure     Interventions     Educational material distributed.  Intravenous LR given.  Intravenous LR bolus of 500 ml given.  pyridium for pain with moderate relief.      PATIENT DISPOSITION     Discharged Home    -pt requesting to go home x2  -pt remains very anxious  -reports 5/10 pain now  -burning with urination which was to be expected  -own abx at home to complete  -feeling "weak" after small burger; pt denies nausea  -baby looks well on monitor  -no contractions noted now   -pt requesting pyridium for dc     Dr. Maddox notified at 2140 of the above assessment.        Sheila Lee RN  [facility]  2023             "

## 2023-09-28 NOTE — NURSING
pt d/c to home. ambulatory at d/c; refused w/c. no acute distress noted aaox4. resp even and unlabored; skin warm dry and appropriate in color. verbalized understanding of d/c instructions. f/u appt tomorrow with dr. han. no questions at this time. rx given x1 called in 24hr [facility].

## 2023-09-28 NOTE — DISCHARGE INSTRUCTIONS
Keep your scheduled appointment with your provider.    Call your Doctor if you have any of the following:  Temperature above 100 degrees  Nausea, vomiting and/or diarrhea  Severe headache, dizziness, or blurred vision  Notable increase in swelling of hands or feet  Notable swelling of face and lips  Difficulty, pain or burning with urination  Foul smelling vaginal discharge  Decreased fetal movement    Come to the hospital if you have any of the following symptoms:  Your water breaks  More than 4-6 contractions in 1 hour for 2 or more hours  Vaginal bleeding like a period    After 28 weeks, you should feel 10 distinct fetal movements within a 2 hour period.    It is recommended that you drink 1/2 a gallon of water each day.  Tea, Soda and Juice are  in addition to this.       -Take your home antibiotic as prescribed starting tomorrow am 9/28/23.     -Take Pyridium PRN for pain. [facility] on [address]    -Call Dr. Eastman's office tomorrow morning 9/28/23 for follow up appointment.

## 2023-10-10 ENCOUNTER — HOSPITAL ENCOUNTER (OUTPATIENT)
Facility: HOSPITAL | Age: 28
LOS: 1 days | Discharge: HOME | End: 2023-10-10
Attending: OBSTETRICS & GYNECOLOGY | Admitting: OBSTETRICS & GYNECOLOGY
Payer: COMMERCIAL

## 2023-10-10 VITALS
DIASTOLIC BLOOD PRESSURE: 53 MMHG | HEART RATE: 94 BPM | TEMPERATURE: 98 F | SYSTOLIC BLOOD PRESSURE: 114 MMHG | OXYGEN SATURATION: 100 % | RESPIRATION RATE: 18 BRPM

## 2023-10-10 DIAGNOSIS — O36.8190 DECREASED FETAL MOVEMENT: ICD-10-CM

## 2023-10-10 PROCEDURE — 59025 FETAL NON-STRESS TEST: CPT

## 2023-10-10 NOTE — NURSING
"[facility]  Department of Obstetrics and Gynecology  Labor & Delivery Triage Assessment    PATIENT NAME: Rina Hearn  MRN: [id number]  TODAY'S DATE: 10/10/2023    CHIEF COMPLAINT: Non-stress Test (Sent form clinic for Dec FM and abd tightening. )    Reports Dec FM x 2 days and constant abd tightening today. Reports calling the clinic and talking to a nurse who told her to come in. Reports eating waffles and syrup for breakfast and a pulled pork sandwich for lunch at 1200. Denies VB or LOF. Of note pt reports having a "Big UTI with bladder spasms" a few weeks ago, but was treated with IV abx and currently denies symptoms of UTI.     OB History    Para Term  AB Living   3 2 2 0 0 2   SAB IAB Ectopic Multiple Live Births   0 0 0 0 2      # Outcome Date GA Lbr Polo/2nd Weight Sex Delivery Anes PTL Lv   3 Current            2 Term 02/10/22 39w2d  3.64 kg (8 lb 0.4 oz) M CS-LTranv Spinal, EPI  SANDRA      Name: NOEMY HALL      Apgar1: 8  Apgar5: 9   1 Term 04/15/20   3.487 kg (7 lb 11 oz) F Vag-Spont EPI N SANDRA     Past Medical History:   Diagnosis Date    PCOS (polycystic ovarian syndrome)      Past Surgical History:   Procedure Laterality Date     SECTION N/A 02/10/2022    Procedure:  SECTION  (PRIMARY)   (BREECH);  Surgeon: Becca Eastman MD;  Location: [facility] L&D;  Service: OB/GYN;  Laterality: N/A;    CHOLECYSTECTOMY  2019    gastric sleeve  2018    LAPAROSCOPIC APPENDECTOMY N/A 2022    Procedure: APPENDECTOMY, LAPAROSCOPIC;  Surgeon: Sanjay Lopez III, MD;  Location: [facility] OR;  Service: General;  Laterality: N/A;         VITAL SIGNS - ABNORMAL VITALS INCLUDE TEMP >100.4,RR <12 or >26, SUSTAINED MATERNAL PULSE <60 or >120     VITAL SIGNS (Most Recent)       VITAL SIGNS     normal  HEADACHE    no     VOMITING    no  VISUAL DISTURBANCES  no  EPIGASTRIC PAIN        no  PROTEINURIA 2+ or MORE             UTD   EDEMA FACE/EXTREMITIES            no    FETAL " MOVEMENT     FETAL MOVEMENT: Decreased  FETAL HEART RATE BASELINE =  125normal  FETAL HEART RATE VARIABILITY:  Moderate  FETAL HEART RATE ACCELERATIONS FOR GESTATIONAL AGE: present  FETAL HEART RATE DECELERATIONS: none    ABDOMINAL PAIN/CRAMPING/CONTRACTIONS     Patient is not complaining of abdominal pain/cramping/contractions.    RUPTURE OF MEMBRANES OR LEAKING OF AMNIOTIC FLUID     Patient denies ROM or leaking of amniotic fluid.    VAGINAL BLEEDING     Patient denies vaginal bleeding.    VAGINAL EXAM     DILATION:    STATION:    EFFACEMENT:    PRESENTATION:      VAGINAL EXAM DEFERRED DUE TO:  Not in Labor    PAIN PRESENT ON ARRIVAL     ONSET:     LOCATION:    PAIN SCALE (0-10):    DESCRIPTION:     Interventions     Report given to oncoming night shift RN.       PATIENT DISPOSITION     Reports given to oncoming nightshift RN.           Ann Benites RN  [facility]  10/10/2023

## 2023-10-11 NOTE — NURSING
1851 Dr. Ellison notified of pts arrival    1957 Pt states she is feeling baby move and is comfortable with going home. Dr. Ellison notified of active FM, orders to discharge pt at this time.    2000 Discharge instructions given to pt and educated on fetal movement.

## 2023-10-16 ENCOUNTER — HOSPITAL ENCOUNTER (OUTPATIENT)
Facility: HOSPITAL | Age: 28
Discharge: HOME | End: 2023-10-16
Attending: OBSTETRICS & GYNECOLOGY | Admitting: OBSTETRICS & GYNECOLOGY
Payer: COMMERCIAL

## 2023-10-16 VITALS — SYSTOLIC BLOOD PRESSURE: 115 MMHG | DIASTOLIC BLOOD PRESSURE: 65 MMHG | HEART RATE: 93 BPM

## 2023-10-16 DIAGNOSIS — O13.9 GESTATIONAL HYPERTENSION: Primary | ICD-10-CM

## 2023-10-16 DIAGNOSIS — O13.9 GESTATIONAL HYPERTENSION: ICD-10-CM

## 2023-10-16 LAB
ALBUMIN SERPL BCP-MCNC: 3.3 G/DL (ref 3.5–5.2)
ALP SERPL-CCNC: 86 U/L (ref 55–135)
ALT SERPL W/O P-5'-P-CCNC: 5 U/L (ref 10–44)
ANION GAP SERPL CALC-SCNC: 6 MMOL/L (ref 8–16)
AST SERPL-CCNC: 8 U/L (ref 10–40)
BILIRUB SERPL-MCNC: 0.6 MG/DL (ref 0.1–1)
BUN SERPL-MCNC: 5 MG/DL (ref 6–20)
CALCIUM SERPL-MCNC: 8.5 MG/DL (ref 8.7–10.5)
CHLORIDE SERPL-SCNC: 107 MMOL/L (ref 95–110)
CO2 SERPL-SCNC: 22 MMOL/L (ref 23–29)
CREAT SERPL-MCNC: 0.6 MG/DL (ref 0.5–1.4)
ERYTHROCYTE [DISTWIDTH] IN BLOOD BY AUTOMATED COUNT: 15.3 % (ref 11.5–14.5)
EST. GFR  (NO RACE VARIABLE): >60 ML/MIN/1.73 M^2
GLUCOSE SERPL-MCNC: 83 MG/DL (ref 70–110)
HCT VFR BLD AUTO: 26.8 % (ref 37–48.5)
HGB BLD-MCNC: 8.5 G/DL (ref 12–16)
MCH RBC QN AUTO: 25.3 PG (ref 27–31)
MCHC RBC AUTO-ENTMCNC: 31.7 G/DL (ref 32–36)
MCV RBC AUTO: 80 FL (ref 82–98)
PLATELET # BLD AUTO: 181 K/UL (ref 150–450)
PMV BLD AUTO: 10.5 FL (ref 9.2–12.9)
POTASSIUM SERPL-SCNC: 3.9 MMOL/L (ref 3.5–5.1)
PROT SERPL-MCNC: 6 G/DL (ref 6–8.4)
RBC # BLD AUTO: 3.36 M/UL (ref 4–5.4)
SODIUM SERPL-SCNC: 135 MMOL/L (ref 136–145)
WBC # BLD AUTO: 6.8 K/UL (ref 3.9–12.7)

## 2023-10-16 PROCEDURE — 59025 FETAL NON-STRESS TEST: CPT

## 2023-10-16 PROCEDURE — 85027 COMPLETE CBC AUTOMATED: CPT | Performed by: OBSTETRICS & GYNECOLOGY

## 2023-10-16 PROCEDURE — 36415 COLL VENOUS BLD VENIPUNCTURE: CPT | Performed by: OBSTETRICS & GYNECOLOGY

## 2023-10-16 PROCEDURE — 80053 COMPREHEN METABOLIC PANEL: CPT | Performed by: OBSTETRICS & GYNECOLOGY

## 2023-10-18 ENCOUNTER — HOSPITAL ENCOUNTER (OUTPATIENT)
Facility: HOSPITAL | Age: 28
Discharge: HOME | End: 2023-10-19
Attending: OBSTETRICS & GYNECOLOGY | Admitting: PLASTIC SURGERY
Payer: COMMERCIAL

## 2023-10-18 DIAGNOSIS — R11.2 NAUSEA & VOMITING: ICD-10-CM

## 2023-10-18 LAB
ALBUMIN SERPL BCP-MCNC: 3.4 G/DL (ref 3.5–5.2)
ALP SERPL-CCNC: 98 U/L (ref 55–135)
ALT SERPL W/O P-5'-P-CCNC: 5 U/L (ref 10–44)
ANION GAP SERPL CALC-SCNC: 9 MMOL/L (ref 8–16)
AST SERPL-CCNC: 13 U/L (ref 10–40)
BACTERIA #/AREA URNS HPF: NEGATIVE /HPF
BACTERIA #/AREA URNS HPF: NEGATIVE /HPF
BASOPHILS # BLD AUTO: 0.01 K/UL (ref 0–0.2)
BASOPHILS NFR BLD: 0.2 % (ref 0–1.9)
BILIRUB SERPL-MCNC: 1 MG/DL (ref 0.1–1)
BILIRUB UR QL STRIP: NEGATIVE
BILIRUB UR QL STRIP: NEGATIVE
BUN SERPL-MCNC: 5 MG/DL (ref 6–20)
CALCIUM SERPL-MCNC: 8.7 MG/DL (ref 8.7–10.5)
CHLORIDE SERPL-SCNC: 105 MMOL/L (ref 95–110)
CLARITY UR: ABNORMAL
CLARITY UR: CLEAR
CO2 SERPL-SCNC: 19 MMOL/L (ref 23–29)
COLOR UR: YELLOW
COLOR UR: YELLOW
CREAT SERPL-MCNC: 0.5 MG/DL (ref 0.5–1.4)
DIFFERENTIAL METHOD: ABNORMAL
EOSINOPHIL # BLD AUTO: 0 K/UL (ref 0–0.5)
EOSINOPHIL NFR BLD: 0.5 % (ref 0–8)
ERYTHROCYTE [DISTWIDTH] IN BLOOD BY AUTOMATED COUNT: 15.5 % (ref 11.5–14.5)
EST. GFR  (NO RACE VARIABLE): >60 ML/MIN/1.73 M^2
GLUCOSE SERPL-MCNC: 71 MG/DL (ref 70–110)
GLUCOSE UR QL STRIP: ABNORMAL
GLUCOSE UR QL STRIP: NEGATIVE
HCT VFR BLD AUTO: 29.1 % (ref 37–48.5)
HGB BLD-MCNC: 9.2 G/DL (ref 12–16)
HGB UR QL STRIP: NEGATIVE
HGB UR QL STRIP: NEGATIVE
HYALINE CASTS #/AREA URNS LPF: 19 /LPF
HYALINE CASTS #/AREA URNS LPF: 3 /LPF
IMM GRANULOCYTES # BLD AUTO: 0.03 K/UL (ref 0–0.04)
IMM GRANULOCYTES NFR BLD AUTO: 0.5 % (ref 0–0.5)
KETONES UR QL STRIP: ABNORMAL
KETONES UR QL STRIP: ABNORMAL
LEUKOCYTE ESTERASE UR QL STRIP: ABNORMAL
LEUKOCYTE ESTERASE UR QL STRIP: ABNORMAL
LYMPHOCYTES # BLD AUTO: 1 K/UL (ref 1–4.8)
LYMPHOCYTES NFR BLD: 15.3 % (ref 18–48)
MCH RBC QN AUTO: 24.8 PG (ref 27–31)
MCHC RBC AUTO-ENTMCNC: 31.6 G/DL (ref 32–36)
MCV RBC AUTO: 78 FL (ref 82–98)
MICROSCOPIC COMMENT: ABNORMAL
MICROSCOPIC COMMENT: ABNORMAL
MONOCYTES # BLD AUTO: 0.6 K/UL (ref 0.3–1)
MONOCYTES NFR BLD: 9.3 % (ref 4–15)
NEUTROPHILS # BLD AUTO: 4.6 K/UL (ref 1.8–7.7)
NEUTROPHILS NFR BLD: 74.2 % (ref 38–73)
NITRITE UR QL STRIP: NEGATIVE
NITRITE UR QL STRIP: NEGATIVE
NRBC BLD-RTO: 0 /100 WBC
PH UR STRIP: 7 [PH] (ref 5–8)
PH UR STRIP: 7 [PH] (ref 5–8)
PLATELET # BLD AUTO: 161 K/UL (ref 150–450)
PMV BLD AUTO: 10.9 FL (ref 9.2–12.9)
POTASSIUM SERPL-SCNC: 3.7 MMOL/L (ref 3.5–5.1)
PROT SERPL-MCNC: 6.1 G/DL (ref 6–8.4)
PROT UR QL STRIP: ABNORMAL
PROT UR QL STRIP: ABNORMAL
RBC # BLD AUTO: 3.71 M/UL (ref 4–5.4)
RBC #/AREA URNS HPF: 1 /HPF (ref 0–4)
RBC #/AREA URNS HPF: 1 /HPF (ref 0–4)
SODIUM SERPL-SCNC: 133 MMOL/L (ref 136–145)
SP GR UR STRIP: 1.01 (ref 1–1.03)
SP GR UR STRIP: 1.01 (ref 1–1.03)
SQUAMOUS #/AREA URNS HPF: 2 /HPF
SQUAMOUS #/AREA URNS HPF: 24 /HPF
URN SPEC COLLECT METH UR: ABNORMAL
URN SPEC COLLECT METH UR: ABNORMAL
UROBILINOGEN UR STRIP-ACNC: NEGATIVE EU/DL
UROBILINOGEN UR STRIP-ACNC: NEGATIVE EU/DL
WBC # BLD AUTO: 6.22 K/UL (ref 3.9–12.7)
WBC #/AREA URNS HPF: 17 /HPF (ref 0–5)
WBC #/AREA URNS HPF: 3 /HPF (ref 0–5)
YEAST URNS QL MICRO: ABNORMAL

## 2023-10-18 PROCEDURE — 25000003 PHARM REV CODE 250: Performed by: OBSTETRICS & GYNECOLOGY

## 2023-10-18 PROCEDURE — 81001 URINALYSIS AUTO W/SCOPE: CPT | Performed by: OBSTETRICS & GYNECOLOGY

## 2023-10-18 PROCEDURE — 85025 COMPLETE CBC W/AUTO DIFF WBC: CPT | Performed by: OBSTETRICS & GYNECOLOGY

## 2023-10-18 PROCEDURE — 80053 COMPREHEN METABOLIC PANEL: CPT | Performed by: OBSTETRICS & GYNECOLOGY

## 2023-10-18 PROCEDURE — 63600175 PHARM REV CODE 636 W HCPCS: Performed by: OBSTETRICS & GYNECOLOGY

## 2023-10-18 PROCEDURE — 87086 URINE CULTURE/COLONY COUNT: CPT | Performed by: OBSTETRICS & GYNECOLOGY

## 2023-10-18 PROCEDURE — 99222 PR INITIAL HOSPITAL CARE,LEVL II: ICD-10-PCS | Mod: ,,, | Performed by: OBSTETRICS & GYNECOLOGY

## 2023-10-18 PROCEDURE — 81001 URINALYSIS AUTO W/SCOPE: CPT | Mod: 91 | Performed by: OBSTETRICS & GYNECOLOGY

## 2023-10-18 PROCEDURE — 99222 1ST HOSP IP/OBS MODERATE 55: CPT | Mod: ,,, | Performed by: OBSTETRICS & GYNECOLOGY

## 2023-10-18 RX ORDER — ONDANSETRON 2 MG/ML
4 INJECTION INTRAMUSCULAR; INTRAVENOUS ONCE
Status: COMPLETED | OUTPATIENT
Start: 2023-10-18 | End: 2023-10-18

## 2023-10-18 RX ORDER — ONDANSETRON 4 MG/1
8 TABLET, ORALLY DISINTEGRATING ORAL EVERY 8 HOURS PRN
Status: DISCONTINUED | OUTPATIENT
Start: 2023-10-18 | End: 2023-10-19 | Stop reason: HOSPADM

## 2023-10-18 RX ORDER — DEXTROSE, SODIUM CHLORIDE, SODIUM LACTATE, POTASSIUM CHLORIDE, AND CALCIUM CHLORIDE 5; .6; .31; .03; .02 G/100ML; G/100ML; G/100ML; G/100ML; G/100ML
INJECTION, SOLUTION INTRAVENOUS CONTINUOUS
Status: DISCONTINUED | OUTPATIENT
Start: 2023-10-18 | End: 2023-10-19 | Stop reason: HOSPADM

## 2023-10-18 RX ORDER — PROCHLORPERAZINE EDISYLATE 5 MG/ML
5 INJECTION INTRAMUSCULAR; INTRAVENOUS EVERY 6 HOURS PRN
Status: DISCONTINUED | OUTPATIENT
Start: 2023-10-18 | End: 2023-10-19 | Stop reason: HOSPADM

## 2023-10-18 RX ORDER — LANOLIN ALCOHOL/MO/W.PET/CERES
1 CREAM (GRAM) TOPICAL DAILY
COMMUNITY
End: 2024-02-20

## 2023-10-18 RX ORDER — ACETAMINOPHEN 500 MG
500 TABLET ORAL EVERY 6 HOURS PRN
Status: DISCONTINUED | OUTPATIENT
Start: 2023-10-18 | End: 2023-10-19 | Stop reason: HOSPADM

## 2023-10-18 RX ORDER — DICYCLOMINE HYDROCHLORIDE 10 MG/1
20 CAPSULE ORAL ONCE
Status: COMPLETED | OUTPATIENT
Start: 2023-10-18 | End: 2023-10-18

## 2023-10-18 RX ORDER — SODIUM CHLORIDE, SODIUM LACTATE, POTASSIUM CHLORIDE, CALCIUM CHLORIDE 600; 310; 30; 20 MG/100ML; MG/100ML; MG/100ML; MG/100ML
INJECTION, SOLUTION INTRAVENOUS CONTINUOUS
Status: DISCONTINUED | OUTPATIENT
Start: 2023-10-18 | End: 2023-10-19 | Stop reason: HOSPADM

## 2023-10-18 RX ADMIN — ONDANSETRON 4 MG: 2 INJECTION INTRAMUSCULAR; INTRAVENOUS at 07:10

## 2023-10-18 RX ADMIN — DEXTROSE, SODIUM CHLORIDE, SODIUM LACTATE, POTASSIUM CHLORIDE, AND CALCIUM CHLORIDE: 5; .6; .31; .03; .02 INJECTION, SOLUTION INTRAVENOUS at 07:10

## 2023-10-18 RX ADMIN — DICYCLOMINE HYDROCHLORIDE 20 MG: 10 CAPSULE ORAL at 10:10

## 2023-10-18 RX ADMIN — DEXTROSE, SODIUM CHLORIDE, SODIUM LACTATE, POTASSIUM CHLORIDE, AND CALCIUM CHLORIDE 1000 ML: 5; .6; .31; .03; .02 INJECTION, SOLUTION INTRAVENOUS at 06:10

## 2023-10-18 NOTE — PROGRESS NOTES
Patient presented with complaints of having had nausea and vomiting and diarrhea last night and today, states she has not been able to hold anything down, states she feels hot , temp upon arrival 98.3 oral, states having low back pain on the right side and having cramping under her rib cage.

## 2023-10-18 NOTE — H&P
ANTEPARTUM ADMISSION NOTE    Subjective:   Chief Complaint:  Diarrhea, Nausea, and Vomiting During Pregnancy       Patient ID: Rina Hearn is a  28 y.o. female.    Date: 10/18/2023    OB Observation:  10/18/2023    Chief Complaint:  Nausea vomiting and diarrhea    History of Present Illness:  Prenatal Care Dr. Eastman    The patient is a 28 y.o.    DELANEY: 2023  EGA: 36-1/7 weeks    She presents to L&D, complaining of nausea vomiting and diarrhea some lower abdominal cramping and fatigue over the last 24 hours.  The issue began last night and since that time patient reports significant fatigue and inability to tolerate fluids and solids with nausea vomiting and diarrhea.  No fevers.  Good fetal movement.  No vaginal bleeding or significant contractions.    Prenatal records available and reviewed.  Reported issues include previous  section x1 with , history of gestational hypertension with previous pregnancy and positive group B strep urine culture.    GYN & OB History  Patient's last menstrual period was 2023 (exact date).     Date of Last Pap: [unfilled]  OB History          3    Para   2    Term   2            AB        Living   2         SAB        IAB        Ectopic        Multiple   0    Live Births   2           Obstetric Comments    section x 1   x 1               Allergies: Review of patient's allergies indicates:  No Known Allergies    Past Medical History:   Diagnosis Date    PCOS (polycystic ovarian syndrome)        Past Surgical History:   Procedure Laterality Date    BARIATRIC SURGERY  2018    Gastric sleeve     SECTION N/A 02/10/2022    Procedure:  SECTION  (PRIMARY)   (BREECH);  Surgeon: Becca Eastman MD;  Location: [facility] L&D;  Service: OB/GYN;  Laterality: N/A;    CHOLECYSTECTOMY  2019    LAPAROSCOPIC APPENDECTOMY N/A 2022    Procedure: APPENDECTOMY, LAPAROSCOPIC;  Surgeon: Sanjay Lopez III, MD;  Location: [facility] OR;   Service: General;  Laterality: N/A;       Medications    Current Facility-Administered Medications:     acetaminophen tablet 500 mg, 500 mg, Oral, Q6H PRN, Yanira Flores MD    dextrose 5 % in lactated ringers infusion, , Intravenous, Continuous, Nam Chowdhury MD    dextrose 5% lactated ringers bolus 1,000 mL, 1,000 mL, Intravenous, Once, Nam Chowdhury MD    lactated ringers infusion, , Intravenous, Continuous, Yanira Flores MD    ondansetron disintegrating tablet 8 mg, 8 mg, Oral, Q8H PRN, Yanira Flores MD    prochlorperazine injection Soln 5 mg, 5 mg, Intravenous, Q6H PRN, Yanira Flores MD       No family history on file.    Review of Systems (at today's evaluation)  Review of Systems   Constitutional:  Positive for appetite change and fatigue.   Respiratory:  Negative for shortness of breath.    Cardiovascular:  Negative for chest pain.   Gastrointestinal:  Positive for diarrhea, nausea and vomiting.   Genitourinary:  Negative for dysuria.   Neurological:  Negative for headaches.        Objective:      Vitals:    10/18/23 1734   BP: (!) 97/52   Pulse: 100   Resp:    Temp:      Physical Exam:   Constitutional: She appears well-developed and well-nourished. She appears ill (Uncomfortable due to nausea vomiting and dehydration).    HENT:   Head: Normocephalic.     Neck: No thyroid mass present.    Cardiovascular:  Normal rate.             Pulmonary/Chest: Effort normal. No respiratory distress.        Abdominal: There is no abdominal tenderness.   Gravid with appropriate fundal height             Musculoskeletal: Normal range of motion.      Right lower leg: No edema.      Left lower leg: No edema.       Neurological: She is alert.   No gross lesions noted.    Skin: Skin is warm and dry.    Psychiatric: She has a normal mood and affect. Her speech is normal and behavior is normal. Mood normal.         Fetal heart tones are currently 130s to 140s Category 1.  Appropriate accelerations.  No significant  decelerations.  Schuyler notes no significant contractions with uterine irritability.     Urinalysis notes trace protein, 3+ ketones and 3+ leukocyte esterase  Type and screen:  AB positive antibody negative  RPR: Negative nonreactive  Rubella: Immune   Hepatitis panel:  Negative   HIV: Negative   Urine culture:  Previous culture with positive group B strep  CBC:  (Date: 3/9/23)   Hemoglobin electrophoresis: No Abnormalities   Gonorrhea/chlamydia:  Negative   Pap smear:  Negative/negative     Fetal aneuploidy status:  Negative XY    1 HrOGTT: 130     Assessment:        1. Nausea & vomiting    Intrauterine pregnancy at 36-1/7 weeks  Ketonuria  History of previous  section with previous   Positive group B strep urine     Plan:      Nausea & vomiting    Other orders  -     Place in Outpatient; Standing  -     Full code; Standing  -     Vital signs; Standing  -     Vital Signs (Specify Frequency); Standing  -     Fetal monitoring; Standing  -     Continuous tocometry; Standing  -     Obtain Fetal nonstress test (NST); Standing  -     Discharge Criteria; Standing  -     Notify clinical provider; Standing  -     Notify clinical provider; Standing  -     lactated ringers infusion  -     ondansetron disintegrating tablet 8 mg  -     prochlorperazine injection Soln 5 mg  -     acetaminophen tablet 500 mg  -     Urinalysis, Reflex to Urine Culture Urine, Clean Catch; Standing  -     Urinalysis Microscopic; Standing  -     Urine culture; Standing  -     CBC auto differential; Standing  -     Comprehensive metabolic panel; Standing  -     dextrose 5% lactated ringers bolus 1,000 mL  -     dextrose 5 % in lactated ringers infusion  -     US OB 14+ Weeks TransAbd, w/Biophysical Profile, w/o NST, Single Gestation (xpd); Standing    The patient was seen evaluated on labor and delivery.  Her chart was reviewed     We discussed the patient's symptoms and signs compatible with significant dehydration.  Maternal and  fetal status is currently stable.  While the patient is being followed for gestational hypertension considered mild blood pressure issues are noted at this time.    At this time will proceed as follows:  D5 LR L bolus followed by 250 cc/hour.    Blood for CBC and BMP  Ultrasound for biophysical profile  Regular diet as tolerated  Zofran for nausea.  The pros, cons, risks, benefits, alternatives and indications of the medication(s) prescribed, as well as appropriate use and potential side effects were discussed.  We discussed the fact that there are potential issues with nearly all medications in pregnancy.  These include but are not limited to birth defects, pregnancy loss, prematurity, infant death, or developmental disabilities.   Continue to check urine until ketone negative  Expectant management at this time    The above was reviewed discussed with the patient her .  Their questions were answered and they are in agreement with the current plan.    Nam Chowdhury MD  Department OBGYN [facility]

## 2023-10-18 NOTE — NURSING
Critical access hospital  Department of Obstetrics and Gynecology  Labor & Delivery Triage Assessment    PATIENT NAME: Rina Hearn  MRN: [id number]  TODAY'S DATE: 10/18/2023    CHIEF COMPLAINT: Diarrhea, Nausea, and Vomiting During Pregnancy      OB History    Para Term  AB Living   3 2 2 0 0 2   SAB IAB Ectopic Multiple Live Births   0 0 0 0 2      # Outcome Date GA Lbr Polo/2nd Weight Sex Delivery Anes PTL Lv   3 Current            2 Term 02/10/22 39w2d  3.64 kg (8 lb 0.4 oz) M CS-LTranv Spinal, EPI  SANDRA      Name: NOEMY HALL      Apgar1: 8  Apgar5: 9   1 Term 04/15/20   3.487 kg (7 lb 11 oz) F Vag-Spont EPI N SANDRA     Past Medical History:   Diagnosis Date    PCOS (polycystic ovarian syndrome)      Past Surgical History:   Procedure Laterality Date     SECTION N/A 02/10/2022    Procedure:  SECTION  (PRIMARY)   (BREECH);  Surgeon: Becca Eastman MD;  Location: [facility] L&D;  Service: OB/GYN;  Laterality: N/A;    CHOLECYSTECTOMY  2019    gastric sleeve  2018    LAPAROSCOPIC APPENDECTOMY N/A 2022    Procedure: APPENDECTOMY, LAPAROSCOPIC;  Surgeon: Sanjay Lopez III, MD;  Location: [facility] OR;  Service: General;  Laterality: N/A;         VITAL SIGNS - ABNORMAL VITALS INCLUDE TEMP >100.4,RR <12 or >26, SUSTAINED MATERNAL PULSE <60 or >120     VITAL SIGNS (Most Recent)  Temp: 98.3 °F (36.8 °C) (10/18/23 1648)  Pulse: 100 (10/18/23 1734)  Resp: 18 (10/18/23 1648)  BP: (!) 97/52 (10/18/23 1734)    VITAL SIGNS     abnormal  HEADACHE    no     VOMITING    yes  VISUAL DISTURBANCES  no  EPIGASTRIC PAIN        yes  PROTEINURIA 2+ or MORE             no   EDEMA FACE/EXTREMITIES            no    FETAL MOVEMENT     FETAL MOVEMENT: Active  FETAL HEART RATE BASELINE =130  abnormal  FETAL HEART RATE VARIABILITY:  Moderate  FETAL HEART RATE ACCELERATIONS FOR GESTATIONAL AGE: present  FETAL HEART RATE DECELERATIONS: none    ABDOMINAL PAIN/CRAMPING/CONTRACTIONS     Patient is not complaining  of abdominal pain/cramping/contractions.    RUPTURE OF MEMBRANES OR LEAKING OF AMNIOTIC FLUID     Patient denies ROM or leaking of amniotic fluid.    VAGINAL BLEEDING     Patient denies vaginal bleeding.    VAGINAL EXAM     DILATION:    STATION:    EFFACEMENT:    PRESENTATION:      VAGINAL EXAM DEFERRED DUE TO:  patient here for gastric symptoms not labor    PAIN PRESENT ON ARRIVAL     ONSET:   Last night  LOCATION:  Low back pain and epigastric area  PAIN SCALE (0-10):  8  DESCRIPTION: cramping and aching    Interventions     None.      PATIENT DISPOSITION           Dr. Flores notified at 1743 of the above assessment.    Kasie Dennis RN  [facility]  10/18/2023

## 2023-10-19 VITALS
WEIGHT: 287 LBS | TEMPERATURE: 99 F | OXYGEN SATURATION: 98 % | SYSTOLIC BLOOD PRESSURE: 99 MMHG | RESPIRATION RATE: 18 BRPM | HEART RATE: 84 BPM | DIASTOLIC BLOOD PRESSURE: 56 MMHG | BODY MASS INDEX: 38.87 KG/M2 | HEIGHT: 72 IN

## 2023-10-19 PROCEDURE — 59025 FETAL NON-STRESS TEST: CPT

## 2023-10-19 PROCEDURE — 63600175 PHARM REV CODE 636 W HCPCS: Performed by: OBSTETRICS & GYNECOLOGY

## 2023-10-19 PROCEDURE — 99211 OFF/OP EST MAY X REQ PHY/QHP: CPT | Mod: 25

## 2023-10-19 RX ADMIN — DEXTROSE, SODIUM CHLORIDE, SODIUM LACTATE, POTASSIUM CHLORIDE, AND CALCIUM CHLORIDE: 5; .6; .31; .03; .02 INJECTION, SOLUTION INTRAVENOUS at 07:10

## 2023-10-19 NOTE — NURSING
1847 Assumed care of pt.  1853 Okay to dc monitoring for FHR and CTX, BP Q1h and continue previous plan of care.  2020 Communicated results of BPP and updated pt status to Trenton PAREDES.  2156 Communicated results of UA to Trenton PAREDES and requested diarrhea meds per pt request.  2208 Trenton PAREDES ordered Bentyl 20 mg po X1 dose for cramping and abd pain, does not want to give meds for diarrhea and lead to constipation.  2258 Trenton PAREDES asks pt if she is feeling well enough to go home. Pt refuses saying she does not and is afraid to go home and become dehydrated again. Trenton PAREDES ordered to continue with plan of care.

## 2023-10-20 LAB
BACTERIA UR CULT: NORMAL
BACTERIA UR CULT: NORMAL

## 2023-10-23 ENCOUNTER — HOSPITAL ENCOUNTER (OUTPATIENT)
Facility: HOSPITAL | Age: 28
Discharge: HOME | End: 2023-10-23
Attending: OBSTETRICS & GYNECOLOGY | Admitting: OBSTETRICS & GYNECOLOGY
Payer: COMMERCIAL

## 2023-10-23 VITALS — RESPIRATION RATE: 18 BRPM | DIASTOLIC BLOOD PRESSURE: 65 MMHG | HEART RATE: 102 BPM | SYSTOLIC BLOOD PRESSURE: 120 MMHG

## 2023-10-23 DIAGNOSIS — O13.9 GESTATIONAL HYPERTENSION: ICD-10-CM

## 2023-10-23 LAB
PROT 24H UR-MRATE: 224 MG/SPEC (ref 6–100)
PROT UR-MCNC: 7 MG/DL (ref 0–15)
URINE COLLECTION DURATION: 24 HR
URINE VOLUME: 3200 ML

## 2023-10-23 PROCEDURE — 84156 ASSAY OF PROTEIN URINE: CPT | Performed by: OBSTETRICS & GYNECOLOGY

## 2023-10-23 PROCEDURE — 59025 FETAL NON-STRESS TEST: CPT

## 2023-10-26 ENCOUNTER — HOSPITAL ENCOUNTER (OUTPATIENT)
Facility: HOSPITAL | Age: 28
Discharge: HOME | End: 2023-10-26
Attending: OBSTETRICS & GYNECOLOGY | Admitting: OBSTETRICS & GYNECOLOGY
Payer: COMMERCIAL

## 2023-10-26 VITALS — DIASTOLIC BLOOD PRESSURE: 57 MMHG | RESPIRATION RATE: 17 BRPM | HEART RATE: 107 BPM | SYSTOLIC BLOOD PRESSURE: 117 MMHG

## 2023-10-26 DIAGNOSIS — O13.9 GESTATIONAL HYPERTENSION: ICD-10-CM

## 2023-10-26 PROCEDURE — 59025 FETAL NON-STRESS TEST: CPT

## 2023-10-30 ENCOUNTER — HOSPITAL ENCOUNTER (OUTPATIENT)
Facility: HOSPITAL | Age: 28
Discharge: HOME | End: 2023-10-30
Attending: OBSTETRICS & GYNECOLOGY | Admitting: OBSTETRICS & GYNECOLOGY
Payer: COMMERCIAL

## 2023-10-30 VITALS — SYSTOLIC BLOOD PRESSURE: 111 MMHG | DIASTOLIC BLOOD PRESSURE: 60 MMHG | RESPIRATION RATE: 18 BRPM | HEART RATE: 103 BPM

## 2023-10-30 DIAGNOSIS — O13.9 GESTATIONAL HYPERTENSION: ICD-10-CM

## 2023-10-30 PROCEDURE — 59025 FETAL NON-STRESS TEST: CPT

## 2023-11-01 ENCOUNTER — HOSPITAL ENCOUNTER (OUTPATIENT)
Facility: HOSPITAL | Age: 28
Discharge: HOME | End: 2023-11-01
Attending: OBSTETRICS & GYNECOLOGY | Admitting: OBSTETRICS & GYNECOLOGY
Payer: COMMERCIAL

## 2023-11-01 VITALS
SYSTOLIC BLOOD PRESSURE: 109 MMHG | TEMPERATURE: 99 F | HEART RATE: 90 BPM | RESPIRATION RATE: 17 BRPM | DIASTOLIC BLOOD PRESSURE: 62 MMHG

## 2023-11-01 DIAGNOSIS — N93.9 VAGINAL BLEEDING: ICD-10-CM

## 2023-11-01 PROCEDURE — 99211 OFF/OP EST MAY X REQ PHY/QHP: CPT

## 2023-11-01 PROCEDURE — 63600175 PHARM REV CODE 636 W HCPCS: Performed by: OBSTETRICS & GYNECOLOGY

## 2023-11-01 RX ORDER — NALBUPHINE HYDROCHLORIDE 10 MG/ML
10 INJECTION, SOLUTION INTRAMUSCULAR; INTRAVENOUS; SUBCUTANEOUS ONCE
Status: COMPLETED | OUTPATIENT
Start: 2023-11-01 | End: 2023-11-01

## 2023-11-01 RX ADMIN — SODIUM CHLORIDE, SODIUM LACTATE, POTASSIUM CHLORIDE, AND CALCIUM CHLORIDE 1000 ML: .6; .31; .03; .02 INJECTION, SOLUTION INTRAVENOUS at 06:11

## 2023-11-01 RX ADMIN — NALBUPHINE HYDROCHLORIDE 10 MG: 10 INJECTION, SOLUTION INTRAMUSCULAR; INTRAVENOUS; SUBCUTANEOUS at 06:11

## 2023-11-01 NOTE — PROGRESS NOTES
Presented with c/o cramping to lower abd. Off and on today and notes spotting at 1500 today, states positive fetal movement, denies any pother leaking.

## 2023-11-02 NOTE — NURSING
[facility]  Department of Obstetrics and Gynecology  Labor & Delivery Triage Assessment    PATIENT NAME: Rina Hearn  MRN: [id number]  TODAY'S DATE: 2023    CHIEF COMPLAINT: Vaginal Discharge      OB History    Para Term  AB Living   3 2 2 0 0 2   SAB IAB Ectopic Multiple Live Births   0 0 0 0 2      # Outcome Date GA Lbr Polo/2nd Weight Sex Delivery Anes PTL Lv   3 Current            2 Term 02/10/22 39w2d  3.64 kg (8 lb 0.4 oz) M CS-LTranv Spinal, EPI  SANDRA      Name: NOEMY HALL      Apgar1: 8  Apgar5: 9   1 Term 04/15/20   3.487 kg (7 lb 11 oz) F Vag-Spont EPI N SANDRA      Obstetric Comments    section x 1    x 1     Past Medical History:   Diagnosis Date    PCOS (polycystic ovarian syndrome)      Past Surgical History:   Procedure Laterality Date    BARIATRIC SURGERY      Gastric sleeve     SECTION N/A 02/10/2022    Procedure:  SECTION  (PRIMARY)   (BREECH);  Surgeon: Becca Eastman MD;  Location: [facility] L&D;  Service: OB/GYN;  Laterality: N/A;    CHOLECYSTECTOMY      LAPAROSCOPIC APPENDECTOMY N/A 2022    Procedure: APPENDECTOMY, LAPAROSCOPIC;  Surgeon: Sanjay Lopez III, MD;  Location: [facility] OR;  Service: General;  Laterality: N/A;         VITAL SIGNS - ABNORMAL VITALS INCLUDE TEMP >100.4,RR <12 or >26, SUSTAINED MATERNAL PULSE <60 or >120     VITAL SIGNS (Most Recent)  Temp: 98.6 °F (37 °C) (23)  Pulse: 88 (23)  Resp: 18 (23)  BP: 136/75 (23)    VITAL SIGNS     normal  HEADACHE    no     VOMITING    no  VISUAL DISTURBANCES  no  EPIGASTRIC PAIN        no  PROTEINURIA 2+ or MORE             no   EDEMA FACE/EXTREMITIES            no    FETAL MOVEMENT     FETAL MOVEMENT: Active  FETAL HEART RATE BASELINE =  115    normal  FETAL HEART RATE VARIABILITY:  Moderate  FETAL HEART RATE ACCELERATIONS FOR GESTATIONAL AGE: present  FETAL HEART RATE DECELERATIONS: none    ABDOMINAL  PAIN/CRAMPING/CONTRACTIONS     Patient is complaining of abdominal pain/cramping/contractions. Contraction pattern is Irregular, less than 5 minutes apart. Contraction strength is mild. Resting tone is relaxed. Abdominal palpation is non-tender.    RUPTURE OF MEMBRANES OR LEAKING OF AMNIOTIC FLUID     Patient denies ROM or leaking of amniotic fluid.    VAGINAL BLEEDING     Patient reports vaginal bleeding. Vaginal bleeding was absent on arrival. Vaginal bleeding started this afternoon and the patient reports the amount as small. Picture provided by pt, according to dayshift nurse bleeding appeared to be spotting. I also visualized picture, appears to be spotting on tissue. Pt denies having to wear pad.    VAGINAL EXAM     DILATION:  1  STATION:  -3  EFFACEMENT:  50      VAGINAL EXAM DEFERRED DUE TO:   completed    PAIN PRESENT ON ARRIVAL     ONSET:   today  LOCATION:  abdomen  PAIN SCALE (0-10):  3  DESCRIPTION: cramping    Interventions     Pt received IV hydration and IV nubain 10 mg at Dr. Marquis's order.      PATIENT DISPOSITION     Discharged Home      Dr. Marquis notified at 1959 of the above assessment. D/c orders received.    Zoey Roman RN  [facility]  11/01/2023

## 2023-11-07 ENCOUNTER — ANESTHESIA EVENT (OUTPATIENT)
Dept: OBSTETRICS AND GYNECOLOGY | Facility: HOSPITAL | Age: 28
End: 2023-11-07
Payer: COMMERCIAL

## 2023-11-07 ENCOUNTER — ANESTHESIA (OUTPATIENT)
Dept: OBSTETRICS AND GYNECOLOGY | Facility: HOSPITAL | Age: 28
End: 2023-11-07
Payer: COMMERCIAL

## 2023-11-07 ENCOUNTER — HOSPITAL ENCOUNTER (INPATIENT)
Facility: HOSPITAL | Age: 28
LOS: 4 days | Discharge: HOME | End: 2023-11-11
Attending: OBSTETRICS & GYNECOLOGY | Admitting: OBSTETRICS & GYNECOLOGY
Payer: COMMERCIAL

## 2023-11-07 DIAGNOSIS — R58 BLEEDING: ICD-10-CM

## 2023-11-07 LAB
ABO GROUP BLD: NORMAL
AMPHET+METHAMPHET UR QL: NEGATIVE
BARBITURATES UR QL SCN>200 NG/ML: NEGATIVE
BASOPHILS # BLD AUTO: 0.01 K/UL (ref 0–0.2)
BASOPHILS # BLD AUTO: 0.02 K/UL (ref 0–0.2)
BASOPHILS NFR BLD: 0.1 % (ref 0–1.9)
BASOPHILS NFR BLD: 0.2 % (ref 0–1.9)
BENZODIAZ UR QL SCN>200 NG/ML: NEGATIVE
BLD GP AB SCN CELLS X3 SERPL QL: NORMAL
BUPRENORPHINE UR QL: NEGATIVE
BZE UR QL SCN: NEGATIVE
CANNABINOIDS UR QL SCN: NEGATIVE
CREAT UR-MCNC: 293.3 MG/DL (ref 15–325)
CREAT UR-MCNC: 293.3 MG/DL (ref 15–325)
DIFFERENTIAL METHOD: ABNORMAL
DIFFERENTIAL METHOD: ABNORMAL
EOSINOPHIL # BLD AUTO: 0 K/UL (ref 0–0.5)
EOSINOPHIL # BLD AUTO: 0 K/UL (ref 0–0.5)
EOSINOPHIL NFR BLD: 0.1 % (ref 0–8)
EOSINOPHIL NFR BLD: 0.2 % (ref 0–8)
ERYTHROCYTE [DISTWIDTH] IN BLOOD BY AUTOMATED COUNT: 15.7 % (ref 11.5–14.5)
ERYTHROCYTE [DISTWIDTH] IN BLOOD BY AUTOMATED COUNT: 15.9 % (ref 11.5–14.5)
HCT VFR BLD AUTO: 27.2 % (ref 37–48.5)
HCT VFR BLD AUTO: 27.3 % (ref 37–48.5)
HGB BLD-MCNC: 8.5 G/DL (ref 12–16)
HGB BLD-MCNC: 8.6 G/DL (ref 12–16)
IMM GRANULOCYTES # BLD AUTO: 0.04 K/UL (ref 0–0.04)
IMM GRANULOCYTES # BLD AUTO: 0.04 K/UL (ref 0–0.04)
IMM GRANULOCYTES NFR BLD AUTO: 0.4 % (ref 0–0.5)
IMM GRANULOCYTES NFR BLD AUTO: 0.5 % (ref 0–0.5)
LYMPHOCYTES # BLD AUTO: 1.4 K/UL (ref 1–4.8)
LYMPHOCYTES # BLD AUTO: 1.8 K/UL (ref 1–4.8)
LYMPHOCYTES NFR BLD: 18.4 % (ref 18–48)
LYMPHOCYTES NFR BLD: 20 % (ref 18–48)
MCH RBC QN AUTO: 24.5 PG (ref 27–31)
MCH RBC QN AUTO: 24.7 PG (ref 27–31)
MCHC RBC AUTO-ENTMCNC: 31.1 G/DL (ref 32–36)
MCHC RBC AUTO-ENTMCNC: 31.6 G/DL (ref 32–36)
MCV RBC AUTO: 78 FL (ref 82–98)
MCV RBC AUTO: 79 FL (ref 82–98)
MONOCYTES # BLD AUTO: 0.4 K/UL (ref 0.3–1)
MONOCYTES # BLD AUTO: 0.6 K/UL (ref 0.3–1)
MONOCYTES NFR BLD: 5.8 % (ref 4–15)
MONOCYTES NFR BLD: 7 % (ref 4–15)
NEUTROPHILS # BLD AUTO: 5.7 K/UL (ref 1.8–7.7)
NEUTROPHILS # BLD AUTO: 6.6 K/UL (ref 1.8–7.7)
NEUTROPHILS NFR BLD: 72.2 % (ref 38–73)
NEUTROPHILS NFR BLD: 75.1 % (ref 38–73)
NRBC BLD-RTO: 0 /100 WBC
NRBC BLD-RTO: 0 /100 WBC
OPIATES UR QL SCN: NEGATIVE
PCP UR QL SCN>25 NG/ML: NEGATIVE
PLATELET # BLD AUTO: 171 K/UL (ref 150–450)
PLATELET # BLD AUTO: 176 K/UL (ref 150–450)
PMV BLD AUTO: 10.4 FL (ref 9.2–12.9)
PMV BLD AUTO: 10.8 FL (ref 9.2–12.9)
RBC # BLD AUTO: 3.44 M/UL (ref 4–5.4)
RBC # BLD AUTO: 3.51 M/UL (ref 4–5.4)
RH BLD: NORMAL
TOXICOLOGY INFORMATION: NORMAL
WBC # BLD AUTO: 7.62 K/UL (ref 3.9–12.7)
WBC # BLD AUTO: 9.1 K/UL (ref 3.9–12.7)

## 2023-11-07 PROCEDURE — 80307 DRUG TEST PRSMV CHEM ANLYZR: CPT | Performed by: OBSTETRICS & GYNECOLOGY

## 2023-11-07 PROCEDURE — 25000003 PHARM REV CODE 250: Performed by: ANESTHESIOLOGY

## 2023-11-07 PROCEDURE — 59514 PRA REAN DELIVERY ONLY: ICD-10-PCS | Mod: QX,CRNA,, | Performed by: STUDENT IN AN ORGANIZED HEALTH CARE EDUCATION/TRAINING PROGRAM

## 2023-11-07 PROCEDURE — 63600175 PHARM REV CODE 636 W HCPCS

## 2023-11-07 PROCEDURE — 36415 COLL VENOUS BLD VENIPUNCTURE: CPT | Performed by: OBSTETRICS & GYNECOLOGY

## 2023-11-07 PROCEDURE — 63600175 PHARM REV CODE 636 W HCPCS: Performed by: ANESTHESIOLOGY

## 2023-11-07 PROCEDURE — 37000009 HC ANESTHESIA EA ADD 15 MINS: Performed by: OBSTETRICS & GYNECOLOGY

## 2023-11-07 PROCEDURE — 71000039 HC RECOVERY, EACH ADD'L HOUR: Performed by: OBSTETRICS & GYNECOLOGY

## 2023-11-07 PROCEDURE — 59514 CESAREAN DELIVERY ONLY: CPT | Mod: QX,CRNA,, | Performed by: STUDENT IN AN ORGANIZED HEALTH CARE EDUCATION/TRAINING PROGRAM

## 2023-11-07 PROCEDURE — 59514 PRA REAN DELIVERY ONLY: ICD-10-PCS | Mod: QY,ANES,, | Performed by: ANESTHESIOLOGY

## 2023-11-07 PROCEDURE — 63600175 PHARM REV CODE 636 W HCPCS: Performed by: STUDENT IN AN ORGANIZED HEALTH CARE EDUCATION/TRAINING PROGRAM

## 2023-11-07 PROCEDURE — 85025 COMPLETE CBC W/AUTO DIFF WBC: CPT | Performed by: OBSTETRICS & GYNECOLOGY

## 2023-11-07 PROCEDURE — 86901 BLOOD TYPING SEROLOGIC RH(D): CPT | Performed by: OBSTETRICS & GYNECOLOGY

## 2023-11-07 PROCEDURE — 37000008 HC ANESTHESIA 1ST 15 MINUTES: Performed by: OBSTETRICS & GYNECOLOGY

## 2023-11-07 PROCEDURE — 25000003 PHARM REV CODE 250: Performed by: STUDENT IN AN ORGANIZED HEALTH CARE EDUCATION/TRAINING PROGRAM

## 2023-11-07 PROCEDURE — 71000033 HC RECOVERY, INTIAL HOUR: Performed by: OBSTETRICS & GYNECOLOGY

## 2023-11-07 PROCEDURE — 12000002 HC ACUTE/MED SURGE SEMI-PRIVATE ROOM

## 2023-11-07 PROCEDURE — 59514 CESAREAN DELIVERY ONLY: CPT | Mod: QY,ANES,, | Performed by: ANESTHESIOLOGY

## 2023-11-07 PROCEDURE — 86592 SYPHILIS TEST NON-TREP QUAL: CPT | Performed by: OBSTETRICS & GYNECOLOGY

## 2023-11-07 PROCEDURE — 25000003 PHARM REV CODE 250: Performed by: OBSTETRICS & GYNECOLOGY

## 2023-11-07 PROCEDURE — C9290 INJ, BUPIVACAINE LIPOSOME: HCPCS

## 2023-11-07 PROCEDURE — 36000685 HC CESAREAN SECTION LEVEL I

## 2023-11-07 PROCEDURE — 86850 RBC ANTIBODY SCREEN: CPT | Performed by: OBSTETRICS & GYNECOLOGY

## 2023-11-07 PROCEDURE — 63600175 PHARM REV CODE 636 W HCPCS: Performed by: OBSTETRICS & GYNECOLOGY

## 2023-11-07 PROCEDURE — 86900 BLOOD TYPING SEROLOGIC ABO: CPT | Performed by: OBSTETRICS & GYNECOLOGY

## 2023-11-07 PROCEDURE — 80348 DRUG SCREENING BUPRENORPHINE: CPT | Performed by: OBSTETRICS & GYNECOLOGY

## 2023-11-07 RX ORDER — PROCHLORPERAZINE EDISYLATE 5 MG/ML
5 INJECTION INTRAMUSCULAR; INTRAVENOUS EVERY 6 HOURS PRN
Status: DISCONTINUED | OUTPATIENT
Start: 2023-11-07 | End: 2023-11-07

## 2023-11-07 RX ORDER — NALOXONE HCL 0.4 MG/ML
0.04 VIAL (ML) INJECTION
Status: DISCONTINUED | OUTPATIENT
Start: 2023-11-07 | End: 2023-11-07

## 2023-11-07 RX ORDER — ONDANSETRON 2 MG/ML
INJECTION INTRAMUSCULAR; INTRAVENOUS
Status: DISCONTINUED | OUTPATIENT
Start: 2023-11-07 | End: 2023-11-07

## 2023-11-07 RX ORDER — OXYTOCIN/RINGER'S LACTATE 30/500 ML
334 PLASTIC BAG, INJECTION (ML) INTRAVENOUS ONCE AS NEEDED
Status: DISCONTINUED | OUTPATIENT
Start: 2023-11-07 | End: 2023-11-11 | Stop reason: HOSPADM

## 2023-11-07 RX ORDER — HYDROMORPHONE HYDROCHLORIDE 1 MG/ML
2 INJECTION, SOLUTION INTRAMUSCULAR; INTRAVENOUS; SUBCUTANEOUS
Status: DISCONTINUED | OUTPATIENT
Start: 2023-11-07 | End: 2023-11-07

## 2023-11-07 RX ORDER — FAMOTIDINE 20 MG/50ML
20 INJECTION, SOLUTION INTRAVENOUS EVERY 12 HOURS PRN
Status: DISCONTINUED | OUTPATIENT
Start: 2023-11-07 | End: 2023-11-11 | Stop reason: HOSPADM

## 2023-11-07 RX ORDER — HYDROMORPHONE HYDROCHLORIDE 1 MG/ML
2 INJECTION, SOLUTION INTRAMUSCULAR; INTRAVENOUS; SUBCUTANEOUS
Status: DISCONTINUED | OUTPATIENT
Start: 2023-11-07 | End: 2023-11-11 | Stop reason: HOSPADM

## 2023-11-07 RX ORDER — DIPHENOXYLATE HYDROCHLORIDE AND ATROPINE SULFATE 2.5; .025 MG/1; MG/1
2 TABLET ORAL EVERY 6 HOURS PRN
Status: DISCONTINUED | OUTPATIENT
Start: 2023-11-07 | End: 2023-11-11 | Stop reason: HOSPADM

## 2023-11-07 RX ORDER — ADHESIVE BANDAGE
30 BANDAGE TOPICAL 2 TIMES DAILY PRN
Status: DISCONTINUED | OUTPATIENT
Start: 2023-11-08 | End: 2023-11-11 | Stop reason: HOSPADM

## 2023-11-07 RX ORDER — DEXAMETHASONE SODIUM PHOSPHATE 4 MG/ML
4 INJECTION, SOLUTION INTRA-ARTICULAR; INTRALESIONAL; INTRAMUSCULAR; INTRAVENOUS; SOFT TISSUE EVERY 12 HOURS PRN
Status: DISCONTINUED | OUTPATIENT
Start: 2023-11-07 | End: 2023-11-11 | Stop reason: HOSPADM

## 2023-11-07 RX ORDER — METHYLERGONOVINE MALEATE 0.2 MG/ML
200 INJECTION INTRAVENOUS ONCE AS NEEDED
Status: DISCONTINUED | OUTPATIENT
Start: 2023-11-07 | End: 2023-11-11 | Stop reason: HOSPADM

## 2023-11-07 RX ORDER — BUPIVACAINE HYDROCHLORIDE 5 MG/ML
24 INJECTION, SOLUTION EPIDURAL; INTRACAUDAL ONCE
Status: DISCONTINUED | OUTPATIENT
Start: 2023-11-07 | End: 2023-11-07

## 2023-11-07 RX ORDER — OXYCODONE HYDROCHLORIDE 5 MG/1
10 TABLET ORAL EVERY 4 HOURS PRN
Status: DISCONTINUED | OUTPATIENT
Start: 2023-11-07 | End: 2023-11-11 | Stop reason: HOSPADM

## 2023-11-07 RX ORDER — PRENATAL WITH FERROUS FUM AND FOLIC ACID 3080; 920; 120; 400; 22; 1.84; 3; 20; 10; 1; 12; 200; 27; 25; 2 [IU]/1; [IU]/1; MG/1; [IU]/1; MG/1; MG/1; MG/1; MG/1; MG/1; MG/1; UG/1; MG/1; MG/1; MG/1; MG/1
1 TABLET ORAL DAILY
Status: DISCONTINUED | OUTPATIENT
Start: 2023-11-08 | End: 2023-11-11 | Stop reason: HOSPADM

## 2023-11-07 RX ORDER — MIDAZOLAM HYDROCHLORIDE 1 MG/ML
INJECTION INTRAMUSCULAR; INTRAVENOUS
Status: DISCONTINUED | OUTPATIENT
Start: 2023-11-07 | End: 2023-11-07

## 2023-11-07 RX ORDER — METHYLERGONOVINE MALEATE 0.2 MG/ML
200 INJECTION INTRAVENOUS
Status: DISCONTINUED | OUTPATIENT
Start: 2023-11-07 | End: 2023-11-07

## 2023-11-07 RX ORDER — CARBOPROST TROMETHAMINE 250 UG/ML
250 INJECTION, SOLUTION INTRAMUSCULAR
Status: DISCONTINUED | OUTPATIENT
Start: 2023-11-07 | End: 2023-11-11 | Stop reason: HOSPADM

## 2023-11-07 RX ORDER — ONDANSETRON 2 MG/ML
4 INJECTION INTRAMUSCULAR; INTRAVENOUS EVERY 12 HOURS PRN
Status: DISCONTINUED | OUTPATIENT
Start: 2023-11-07 | End: 2023-11-07

## 2023-11-07 RX ORDER — OXYTOCIN-SODIUM CHLORIDE 0.9% IV SOLN 30 UNIT/500ML 30-0.9/5 UT/ML-%
SOLUTION INTRAVENOUS
Status: DISCONTINUED | OUTPATIENT
Start: 2023-11-07 | End: 2023-11-07

## 2023-11-07 RX ORDER — NALBUPHINE HYDROCHLORIDE 10 MG/ML
5 INJECTION, SOLUTION INTRAMUSCULAR; INTRAVENOUS; SUBCUTANEOUS
Status: DISCONTINUED | OUTPATIENT
Start: 2023-11-07 | End: 2023-11-07

## 2023-11-07 RX ORDER — OXYTOCIN/RINGER'S LACTATE 30/500 ML
95 PLASTIC BAG, INJECTION (ML) INTRAVENOUS ONCE
Status: DISCONTINUED | OUTPATIENT
Start: 2023-11-07 | End: 2023-11-11 | Stop reason: HOSPADM

## 2023-11-07 RX ORDER — TRANEXAMIC ACID 10 MG/ML
1000 INJECTION, SOLUTION INTRAVENOUS EVERY 30 MIN PRN
Status: DISCONTINUED | OUTPATIENT
Start: 2023-11-07 | End: 2023-11-11 | Stop reason: HOSPADM

## 2023-11-07 RX ORDER — MORPHINE SULFATE 0.5 MG/ML
INJECTION, SOLUTION EPIDURAL; INTRATHECAL; INTRAVENOUS
Status: DISCONTINUED | OUTPATIENT
Start: 2023-11-07 | End: 2023-11-07

## 2023-11-07 RX ORDER — OXYTOCIN/RINGER'S LACTATE 30/500 ML
334 PLASTIC BAG, INJECTION (ML) INTRAVENOUS ONCE
Status: DISCONTINUED | OUTPATIENT
Start: 2023-11-07 | End: 2023-11-07

## 2023-11-07 RX ORDER — MUPIROCIN 20 MG/G
OINTMENT TOPICAL 2 TIMES DAILY
Status: DISCONTINUED | OUTPATIENT
Start: 2023-11-07 | End: 2023-11-11 | Stop reason: HOSPADM

## 2023-11-07 RX ORDER — DEXAMETHASONE SODIUM PHOSPHATE 4 MG/ML
4 INJECTION, SOLUTION INTRA-ARTICULAR; INTRALESIONAL; INTRAMUSCULAR; INTRAVENOUS; SOFT TISSUE EVERY 12 HOURS PRN
Status: DISCONTINUED | OUTPATIENT
Start: 2023-11-07 | End: 2023-11-07

## 2023-11-07 RX ORDER — OXYTOCIN/RINGER'S LACTATE 30/500 ML
95 PLASTIC BAG, INJECTION (ML) INTRAVENOUS ONCE AS NEEDED
Status: DISCONTINUED | OUTPATIENT
Start: 2023-11-07 | End: 2023-11-11 | Stop reason: HOSPADM

## 2023-11-07 RX ORDER — BUPIVACAINE HYDROCHLORIDE 7.5 MG/ML
INJECTION, SOLUTION EPIDURAL; RETROBULBAR
Status: COMPLETED | OUTPATIENT
Start: 2023-11-07 | End: 2023-11-07

## 2023-11-07 RX ORDER — FAMOTIDINE 10 MG/ML
20 INJECTION INTRAVENOUS 2 TIMES DAILY PRN
Status: DISCONTINUED | OUTPATIENT
Start: 2023-11-07 | End: 2023-11-07

## 2023-11-07 RX ORDER — MISOPROSTOL 200 UG/1
800 TABLET ORAL
Status: DISCONTINUED | OUTPATIENT
Start: 2023-11-07 | End: 2023-11-07

## 2023-11-07 RX ORDER — FENTANYL CITRATE 50 UG/ML
INJECTION, SOLUTION INTRAMUSCULAR; INTRAVENOUS
Status: DISCONTINUED | OUTPATIENT
Start: 2023-11-07 | End: 2023-11-07

## 2023-11-07 RX ORDER — ONDANSETRON 2 MG/ML
4 INJECTION INTRAMUSCULAR; INTRAVENOUS EVERY 8 HOURS PRN
Status: DISCONTINUED | OUTPATIENT
Start: 2023-11-07 | End: 2023-11-11 | Stop reason: HOSPADM

## 2023-11-07 RX ORDER — DIPHENHYDRAMINE HYDROCHLORIDE 50 MG/ML
6.25 INJECTION INTRAMUSCULAR; INTRAVENOUS EVERY 4 HOURS PRN
Status: DISCONTINUED | OUTPATIENT
Start: 2023-11-07 | End: 2023-11-11 | Stop reason: HOSPADM

## 2023-11-07 RX ORDER — MEPERIDINE HYDROCHLORIDE 25 MG/ML
12.5 INJECTION INTRAMUSCULAR; INTRAVENOUS; SUBCUTANEOUS ONCE AS NEEDED
Status: DISCONTINUED | OUTPATIENT
Start: 2023-11-07 | End: 2023-11-07

## 2023-11-07 RX ORDER — MISOPROSTOL 200 UG/1
800 TABLET ORAL ONCE AS NEEDED
Status: DISCONTINUED | OUTPATIENT
Start: 2023-11-07 | End: 2023-11-11 | Stop reason: HOSPADM

## 2023-11-07 RX ORDER — PROCHLORPERAZINE EDISYLATE 5 MG/ML
5 INJECTION INTRAMUSCULAR; INTRAVENOUS EVERY 6 HOURS PRN
Status: DISCONTINUED | OUTPATIENT
Start: 2023-11-07 | End: 2023-11-11 | Stop reason: HOSPADM

## 2023-11-07 RX ORDER — ACETAMINOPHEN 500 MG
500 TABLET ORAL EVERY 6 HOURS PRN
Status: ON HOLD | COMMUNITY
End: 2023-11-11

## 2023-11-07 RX ORDER — OXYCODONE HYDROCHLORIDE 5 MG/1
5 TABLET ORAL EVERY 4 HOURS PRN
Status: DISCONTINUED | OUTPATIENT
Start: 2023-11-07 | End: 2023-11-11 | Stop reason: HOSPADM

## 2023-11-07 RX ORDER — OXYTOCIN 10 [USP'U]/ML
10 INJECTION, SOLUTION INTRAMUSCULAR; INTRAVENOUS ONCE AS NEEDED
Status: DISCONTINUED | OUTPATIENT
Start: 2023-11-07 | End: 2023-11-11 | Stop reason: HOSPADM

## 2023-11-07 RX ORDER — BISACODYL 10 MG
10 SUPPOSITORY, RECTAL RECTAL ONCE AS NEEDED
Status: DISCONTINUED | OUTPATIENT
Start: 2023-11-07 | End: 2023-11-11 | Stop reason: HOSPADM

## 2023-11-07 RX ORDER — NALBUPHINE HYDROCHLORIDE 10 MG/ML
5 INJECTION, SOLUTION INTRAMUSCULAR; INTRAVENOUS; SUBCUTANEOUS EVERY 4 HOURS PRN
Status: DISCONTINUED | OUTPATIENT
Start: 2023-11-07 | End: 2023-11-11 | Stop reason: HOSPADM

## 2023-11-07 RX ORDER — DIPHENHYDRAMINE HCL 25 MG
25 CAPSULE ORAL EVERY 4 HOURS PRN
Status: DISCONTINUED | OUTPATIENT
Start: 2023-11-07 | End: 2023-11-11 | Stop reason: HOSPADM

## 2023-11-07 RX ORDER — SIMETHICONE 80 MG
1 TABLET,CHEWABLE ORAL EVERY 6 HOURS PRN
Status: DISCONTINUED | OUTPATIENT
Start: 2023-11-07 | End: 2023-11-11 | Stop reason: HOSPADM

## 2023-11-07 RX ORDER — HYDROCORTISONE 25 MG/G
CREAM TOPICAL 3 TIMES DAILY PRN
Status: DISCONTINUED | OUTPATIENT
Start: 2023-11-07 | End: 2023-11-11 | Stop reason: HOSPADM

## 2023-11-07 RX ORDER — ACETAMINOPHEN 10 MG/ML
1000 INJECTION, SOLUTION INTRAVENOUS ONCE
Status: DISCONTINUED | OUTPATIENT
Start: 2023-11-07 | End: 2023-11-07

## 2023-11-07 RX ORDER — ACETAMINOPHEN 10 MG/ML
INJECTION, SOLUTION INTRAVENOUS
Status: DISCONTINUED | OUTPATIENT
Start: 2023-11-07 | End: 2023-11-07

## 2023-11-07 RX ORDER — SODIUM CHLORIDE, SODIUM LACTATE, POTASSIUM CHLORIDE, CALCIUM CHLORIDE 600; 310; 30; 20 MG/100ML; MG/100ML; MG/100ML; MG/100ML
INJECTION, SOLUTION INTRAVENOUS CONTINUOUS
Status: DISCONTINUED | OUTPATIENT
Start: 2023-11-07 | End: 2023-11-07

## 2023-11-07 RX ORDER — DOCUSATE SODIUM 100 MG/1
200 CAPSULE, LIQUID FILLED ORAL 2 TIMES DAILY
Status: DISCONTINUED | OUTPATIENT
Start: 2023-11-07 | End: 2023-11-11 | Stop reason: HOSPADM

## 2023-11-07 RX ADMIN — MORPHINE SULFATE 3 MG: 0.5 INJECTION, SOLUTION EPIDURAL; INTRATHECAL; INTRAVENOUS at 01:11

## 2023-11-07 RX ADMIN — NALBUPHINE HYDROCHLORIDE 5 MG: 10 INJECTION, SOLUTION INTRAMUSCULAR; INTRAVENOUS; SUBCUTANEOUS at 05:11

## 2023-11-07 RX ADMIN — FENTANYL CITRATE 10 MCG: 50 INJECTION, SOLUTION INTRAMUSCULAR; INTRAVENOUS at 01:11

## 2023-11-07 RX ADMIN — HYDROMORPHONE HYDROCHLORIDE 2 MG: 1 INJECTION, SOLUTION INTRAMUSCULAR; INTRAVENOUS; SUBCUTANEOUS at 07:11

## 2023-11-07 RX ADMIN — OXYCODONE HYDROCHLORIDE 10 MG: 5 TABLET ORAL at 04:11

## 2023-11-07 RX ADMIN — ONDANSETRON 4 MG: 2 INJECTION INTRAMUSCULAR; INTRAVENOUS at 01:11

## 2023-11-07 RX ADMIN — HYDROMORPHONE HYDROCHLORIDE 2 MG: 1 INJECTION, SOLUTION INTRAMUSCULAR; INTRAVENOUS; SUBCUTANEOUS at 03:11

## 2023-11-07 RX ADMIN — FENTANYL CITRATE 90 MCG: 50 INJECTION, SOLUTION INTRAMUSCULAR; INTRAVENOUS at 01:11

## 2023-11-07 RX ADMIN — Medication 30 UNITS: at 01:11

## 2023-11-07 RX ADMIN — ACETAMINOPHEN 1000 MG: 10 INJECTION, SOLUTION INTRAVENOUS at 01:11

## 2023-11-07 RX ADMIN — DIPHENHYDRAMINE HYDROCHLORIDE 6.25 MG: 50 INJECTION INTRAMUSCULAR; INTRAVENOUS at 09:11

## 2023-11-07 RX ADMIN — IBUPROFEN 600 MG: 200 TABLET, FILM COATED ORAL at 11:11

## 2023-11-07 RX ADMIN — FENTANYL CITRATE 100 MCG: 50 INJECTION, SOLUTION INTRAMUSCULAR; INTRAVENOUS at 01:11

## 2023-11-07 RX ADMIN — MORPHINE SULFATE 2 MG: 0.5 INJECTION, SOLUTION EPIDURAL; INTRATHECAL; INTRAVENOUS at 01:11

## 2023-11-07 RX ADMIN — OXYCODONE HYDROCHLORIDE 10 MG: 5 TABLET ORAL at 11:11

## 2023-11-07 RX ADMIN — ONDANSETRON 4 MG: 2 INJECTION INTRAMUSCULAR; INTRAVENOUS at 03:11

## 2023-11-07 RX ADMIN — DOCUSATE SODIUM 200 MG: 100 CAPSULE, LIQUID FILLED ORAL at 09:11

## 2023-11-07 RX ADMIN — DEXTROSE 3 G: 50 INJECTION, SOLUTION INTRAVENOUS at 01:11

## 2023-11-07 RX ADMIN — SODIUM CHLORIDE, SODIUM LACTATE, POTASSIUM CHLORIDE, AND CALCIUM CHLORIDE: .6; .31; .03; .02 INJECTION, SOLUTION INTRAVENOUS at 11:11

## 2023-11-07 RX ADMIN — BUPIVACAINE HYDROCHLORIDE 1.5 ML: 7.5 INJECTION, SOLUTION EPIDURAL; RETROBULBAR at 01:11

## 2023-11-07 RX ADMIN — SODIUM CHLORIDE, SODIUM LACTATE, POTASSIUM CHLORIDE, AND CALCIUM CHLORIDE: .6; .31; .03; .02 INJECTION, SOLUTION INTRAVENOUS at 12:11

## 2023-11-07 RX ADMIN — MIDAZOLAM HYDROCHLORIDE 2 MG: 1 INJECTION, SOLUTION INTRAMUSCULAR; INTRAVENOUS at 01:11

## 2023-11-07 RX ADMIN — DIPHENHYDRAMINE HYDROCHLORIDE 6.25 MG: 50 INJECTION INTRAMUSCULAR; INTRAVENOUS at 04:11

## 2023-11-07 NOTE — TRANSFER OF CARE
Anesthesia Transfer of Care Note    Patient: Rina Hearn    Procedure(s) Performed: Procedure(s) (LRB):   SECTION (N/A)    Patient location: Labor and Delivery    Anesthesia Type: CSE    Transport from OR: Transported from OR on room air with adequate spontaneous ventilation    Post pain: adequate analgesia    Post assessment: no apparent anesthetic complications and tolerated procedure well    Post vital signs: stable    Level of consciousness: awake and alert    Nausea/Vomiting: no nausea/vomiting    Complications: none    Transfer of care protocol was followed      Last vitals:   Visit Vitals  /69   Pulse 105   Resp 18   Ht 6' (1.829 m)   Wt 134.7 kg (297 lb)   LMP 2023 (Exact Date)   SpO2 99%   Breastfeeding No   BMI 40.28 kg/m²

## 2023-11-07 NOTE — ANESTHESIA PROCEDURE NOTES
CSE    Patient location during procedure: OR  Start time: 11/7/2023 12:59 PM  Timeout: 11/7/2023 12:58 PM  End time: 11/7/2023 1:10 PM      Staffing  Authorizing Provider: Zeb Reeves MD  Performing Provider: Zeb Reeves MD    Staffing  Performed by: Zeb Reeves MD  Authorized by: Zeb Reeves MD    Preanesthetic Checklist  Completed: patient identified, IV checked, risks and benefits discussed, surgical consent, monitors and equipment checked, pre-op evaluation and timeout performed  CSE  Patient position: sitting  Prep: Betadine  Patient monitoring: heart rate, cardiac monitor, continuous pulse ox and frequent blood pressure checks  Approach: midline  Spinal Needle  Needle type: pencil-tip   Needle gauge: 25 G  Needle length: 5 in  Epidural Needle  Injection technique: MANDI air  Needle type: Tuohy   Needle gauge: 17 G  Needle length: 3.5 in  Location: L3-4  Needle localization: anatomical landmarks   Catheter  Catheter type: springwound  Catheter size: 19 G  Test dose: lidocaine 1.5% with Epi 1-to-200,000  Test dose: 3 mL  Additional Documentation: incremental injection, negative aspiration for CSF and negative aspiration for heme  Assessment  Sensory level: T5   Dermatomal levels determined by pinch or prick  Intrathecal Medications:   administered: primary anesthetic mcg of    Additional Notes  After CSF was obtained, a mixture of bupivacaine 0.75% hyperbaric x 1.5 mL. with fentanyl 10 mcg was injected.      Medications:    Medications: Intraspinal - bupivacaine (pf) (MARCAINE) injection 0.75% - Intraspinal   1.5 mL - 11/7/2023 1:10:00 PM

## 2023-11-07 NOTE — NURSING TRANSFER
Nursing Transfer Note      11/7/2023   4:40    Nurse giving handoff:srvaanthi Flores rn   Nurse receiving handoff:LEROY Najera RN    Reason patient is being transferred: postpartum care     Transfer To: 2107    Transfer via wheelchair    Transported by sravanthi Flores rn     Transfer Vital Signs:  Blood Pressure:122/72  Heart Rate:70  O2:100  Temperature:97.7  Respirations:16      Order for Tele Monitor? No    Additional Lines: Sotelo Catheter    4eyes on Skin: no    Medicines sent: na    Any special needs or follow-up needed: none    Patient belongings transferred with patient: Yes    Chart send with patient: Yes    Notified: spouse    Patient reassessed at: 79885111/7/23 (date, time)  1  Upon arrival to floor: patient oriented to room call [facility]

## 2023-11-07 NOTE — PLAN OF CARE
Critical access hospital  Discharge Assessment    Primary Care Provider: No, Primary Doctor   OB Screen completed and no needs identified at this time.  White board in room updated with contact information, and mother was encouraged to contact office if further needs arise.    OB Screen (most recent)       OB Screen - 23 1419          OB SCREEN    Assessment Type Discharge Planning Assessment     Source of Information health care advocate     Received Prenatal Care Yes     Is this a teen pregnancy No     Is the baby in NICU No     Indication for adoption/[facility] No     Indication for DME/post-acute needs No     HIV (+) No     Any congenital  disorders No     Fetal demise/ death No

## 2023-11-07 NOTE — ANESTHESIA PREPROCEDURE EVALUATION
2023  Rina Hearn is a 28 y.o., female.      Patient Active Problem List   Diagnosis    Acute appendicitis with localized peritonitis, without perforation, abscess, or gangrene    Right ovarian cyst    UTI (urinary tract infection)       Past Surgical History:   Procedure Laterality Date    BARIATRIC SURGERY  2018    Gastric sleeve     SECTION N/A 02/10/2022    Procedure:  SECTION  (PRIMARY)   (BREECH);  Surgeon: Becca Eastman MD;  Location: [facility] L&D;  Service: OB/GYN;  Laterality: N/A;    CHOLECYSTECTOMY  2019    LAPAROSCOPIC APPENDECTOMY N/A 2022    Procedure: APPENDECTOMY, LAPAROSCOPIC;  Surgeon: Sanjay Lopez III, MD;  Location: [facility] OR;  Service: General;  Laterality: N/A;        Tobacco Use:  The patient  reports that she has never smoked. She has never used smokeless tobacco.     No results found for this or any previous visit.          Lab Results   Component Value Date    WBC 6.22 10/18/2023    HGB 9.2 (L) 10/18/2023    HCT 29.1 (L) 10/18/2023    MCV 78 (L) 10/18/2023     10/18/2023     BMP  Lab Results   Component Value Date     (L) 10/18/2023    K 3.7 10/18/2023     10/18/2023    CO2 19 (L) 10/18/2023    BUN 5 (L) 10/18/2023    CREATININE 0.5 10/18/2023    CALCIUM 8.7 10/18/2023    ANIONGAP 9 10/18/2023    GLU 71 10/18/2023    GLU 83 10/16/2023    GLU 79 2023       No results found for this or any previous visit.          Pre-op Assessment    I have reviewed the Patient Summary Reports.     I have reviewed the Nursing Notes. I have reviewed the NPO Status.   I have reviewed the Medications.     Review of Systems  Anesthesia Hx:  No problems with previous Anesthesia  Denies Family Hx of Anesthesia complications.   Denies Personal Hx of Anesthesia complications.   Social:  Non-Smoker    Hematology/Oncology:  Hematology Normal    Oncology Normal     EENT/Dental:EENT/Dental Normal   Cardiovascular:  Cardiovascular Normal     Pulmonary:  Pulmonary Normal    Renal/:  Renal/ Normal     Hepatic/GI:  Hepatic/GI Normal    Musculoskeletal:  Musculoskeletal Normal    Neurological:  Neurology Normal    Endocrine:  Endocrine Normal    Psych:  Psychiatric Normal           Physical Exam  General: Well nourished, Cooperative, Alert and Oriented    Airway:  Mallampati: II   Mouth Opening: Normal  TM Distance: Normal  Tongue: Normal  Neck ROM: Normal ROM    Dental:  Intact    Chest/Lungs:  Clear to auscultation    Heart:  Rate: Normal  Rhythm: Regular Rhythm  Sounds: Normal        Anesthesia Plan  Type of Anesthesia, risks & benefits discussed:    Anesthesia Type: CSE  Intra-op Monitoring Plan: Standard ASA Monitors  Informed Consent: Informed consent signed with the Patient and all parties understand the risks and agree with anesthesia plan.  All questions answered.   ASA Score: 2    Ready For Surgery From Anesthesia Perspective.     .

## 2023-11-07 NOTE — ANESTHESIA POSTPROCEDURE EVALUATION
Anesthesia Post Evaluation    Patient: Rina Hearn    Procedure(s) Performed: Procedure(s) (LRB):   SECTION (N/A)    Final Anesthesia Type: CSE      Patient location during evaluation: labor & delivery  Patient participation: Yes- Able to Participate  Level of consciousness: awake and alert  Post-procedure vital signs: reviewed and stable  Pain management: adequate  Airway patency: patent    PONV status at discharge: No PONV  Anesthetic complications: no      Cardiovascular status: blood pressure returned to baseline and stable  Respiratory status: unassisted and room air  Hydration status: euvolemic  Follow-up not needed.  Comments: Patient is status post .  We will follow the patient again tomorrow for pain management.          Vitals Value Taken Time   /59 23 1423   Temp 97F 23 1429   Pulse 63 23 1427   Resp 18 23 1100   SpO2 100 % 23 1427   Vitals shown include unvalidated device data.      No case tracking events are documented in the log.      Pain/Shlomo Score: No data recorded

## 2023-11-07 NOTE — L&D DELIVERY NOTE
[facility]   Section   Operative Note    SUMMARY     Date of Procedure: 2023     Procedure: repeat low transverse  section    Surgeon(s) and Role:     * Becca Eastman MD - Primary    Assisting Surgeon: Luli Ellison MD    Pre-Operative Diagnosis: IUP at 39 wga, previous c/section x 1    Post-Operative Diagnosis: same    Anesthesia: spinal           Description of the Findings of the Procedure: VMI, vertex presentation, apgars 5/8, 8#8oz, grossly normal uterus, fallopian tubes, and ovaries, minimal pelvic adhesions      Complications: No    Blood Loss: 500 ml     Procedure in detail - The risks, benefits, indication, and alternatives of the procedure were discussed with the patient and informed consent was obtained.  She was taken to the operating room where spinal anesthesia was found to be adequate.She was prepped and draped in normal sterile fashion in the supine position with a Sotelo catheter in place. A Pfannenstiel skin incision was made with a scalpel and carried through to the underlying layer fascia with the bovie. The fascia was incised in the midline and this incision was extended laterally using the curved Olguin scissors. The superior aspect of the fascial incision was grasped with Leslie clamps and underlying rectus muscles were dissected off sharply using curved Olguin scissors and bluntly. The inferior aspect of the fascial incision was treated in a similar manner. The rectus muscles were  in the midline and the peritoneum was identified and entered bluntly. The peritoneal incision was extended superiorly and inferiorly with good visualization of bladder. The bladder blade was inserted and vesicouterine peritoneum was incised in the midline. This incision was extended laterally and the bladder flap created digitally. The lower uterine segment was incised in the midline and this incision was extended laterally digitally also. The membranes were ruptured upon  entry. The bladder blade was removed and the infant's head shoulders and body delivered without difficulty. The cord was clamped and cut and the infant was handed off to waiting nursery staff. The placenta was delivered and the uterus was exteriorized and cleared of all clots and debris. The uterine incision was repaired with 0 PDS in a running locked fashion in one layer.  Several zero chromic figure of 8 sutures were placed at the left apex of the incision and excellent hemostasis was seen. The uterus was returned the pelvis and the pelvis was irrigated copiously with warm normal saline. The uterine incision and lower uterine segment were again checked for hemostasis and all instruments and sponges were removed from the abdomen and pelvis. The peritoneum was closed using 2-0 Vicryl in running fashion. The rectus muscles were checked for hemostasis. The fascia was closed using 0 PDS in a running fashion. The subcuticular fat was irrigated with normal saline and hemostasis was achieved using the Bovie. A solution of exparel and marcaine was injected sub q per unit protocol.  Willard's fascia was reapproximated using 2-0 Vicryl in a running fashion. The skin was closed using 4-0 Vicryl in a subcuticular fashion and a Mepilex dressing was placed over the incision. The patient tolerated procedure well. Sponge lap needle and instrument counts were correct ×2. She was taken recovery in stable condition.          Specimens:   Specimen (24h ago, onward)      None            Condition: Good    Disposition:  L&D    Attestation: Good         Delivery Information for Roc Hearn    Birth information:  YOB: 2021   Time of birth: 1:25 PM   Sex: male   Head Delivery Date/Time:     Delivery type:    Gestational Age: 39w0d        Delivery Providers    Delivering clinician:            Measurements    Weight:   Length:          Apgars    Living status:   Apgar Component Scores:  1 min.:  5 min.:  10 min.:  15 min.:   20 min.:    Skin color:         Heart rate:         Reflex irritability:         Muscle tone:         Respiratory effort:         Total:                                  Interventions/Resuscitation           Cord    No data filed       Placenta    Placenta delivery date/time:   Placenta removal:            Labor Events:       labor:       Labor Onset Date/Time:         Dilation Complete Date/Time:         Start Pushing Date/Time:         Start Pushing Date/Time:       Rupture Date/Time:            Rupture type:          Fluid Amount:       Fluid Color:                steroids:       Antibiotics given for GBS:       Induction:       Indications for induction:        Augmentation:       Indications for augmentation:       Labor complications:       Additional complications:          Cervical ripening:                     Delivery:      Episiotomy:       Indication for Episiotomy:       Perineal Lacerations:   Repaired:      Periurethral Laceration:   Repaired:     Labial Laceration:   Repaired:     Sulcus Laceration:   Repaired:     Vaginal Laceration:   Repaired:     Cervical Laceration:   Repaired:     Repair suture:       Repair # of packets:       Last Value - EBL - Nursing (mL):       Sum - EBL - Nursing (mL): 0     Last Value - EBL - Anesthesia (mL):      Calculated QBL (mL):       Vaginal Sweep Performed:       Surgicount Correct:         Other providers:            Details (if applicable):  Trial of Labor      Categorization:      Priority:     Indications for :     Incision Type:       Additional  information:  Forceps:    Vacuum:    Breech:    Observed anomalies    Other (Comments):

## 2023-11-07 NOTE — PLAN OF CARE
POC reviewed  with pt and , questions answered- pt is calm and cooperative, NPO since MN. Consents signed.

## 2023-11-08 PROCEDURE — 25000003 PHARM REV CODE 250: Performed by: GENERAL PRACTICE

## 2023-11-08 PROCEDURE — 25000003 PHARM REV CODE 250: Performed by: OBSTETRICS & GYNECOLOGY

## 2023-11-08 PROCEDURE — 63600175 PHARM REV CODE 636 W HCPCS: Performed by: ANESTHESIOLOGY

## 2023-11-08 PROCEDURE — 25000003 PHARM REV CODE 250: Performed by: ANESTHESIOLOGY

## 2023-11-08 PROCEDURE — 12000002 HC ACUTE/MED SURGE SEMI-PRIVATE ROOM

## 2023-11-08 RX ORDER — OXYCODONE AND ACETAMINOPHEN 10; 325 MG/1; MG/1
1 TABLET ORAL EVERY 4 HOURS PRN
Status: DISCONTINUED | OUTPATIENT
Start: 2023-11-08 | End: 2023-11-11 | Stop reason: HOSPADM

## 2023-11-08 RX ORDER — ZOLPIDEM TARTRATE 5 MG/1
5 TABLET ORAL ONCE AS NEEDED
Status: DISCONTINUED | OUTPATIENT
Start: 2023-11-08 | End: 2023-11-11 | Stop reason: HOSPADM

## 2023-11-08 RX ORDER — OXYCODONE AND ACETAMINOPHEN 5; 325 MG/1; MG/1
1 TABLET ORAL EVERY 4 HOURS PRN
Status: DISCONTINUED | OUTPATIENT
Start: 2023-11-08 | End: 2023-11-11 | Stop reason: HOSPADM

## 2023-11-08 RX ORDER — ZOLPIDEM TARTRATE 5 MG/1
5 TABLET ORAL NIGHTLY PRN
Status: DISCONTINUED | OUTPATIENT
Start: 2023-11-08 | End: 2023-11-11 | Stop reason: HOSPADM

## 2023-11-08 RX ADMIN — SIMETHICONE 80 MG: 80 TABLET, CHEWABLE ORAL at 07:11

## 2023-11-08 RX ADMIN — NALBUPHINE HYDROCHLORIDE 5 MG: 10 INJECTION, SOLUTION INTRAMUSCULAR; INTRAVENOUS; SUBCUTANEOUS at 08:11

## 2023-11-08 RX ADMIN — IBUPROFEN 600 MG: 200 TABLET, FILM COATED ORAL at 05:11

## 2023-11-08 RX ADMIN — NALBUPHINE HYDROCHLORIDE 5 MG: 10 INJECTION, SOLUTION INTRAMUSCULAR; INTRAVENOUS; SUBCUTANEOUS at 03:11

## 2023-11-08 RX ADMIN — OXYCODONE HYDROCHLORIDE AND ACETAMINOPHEN 1 TABLET: 10; 325 TABLET ORAL at 11:11

## 2023-11-08 RX ADMIN — IBUPROFEN 600 MG: 200 TABLET, FILM COATED ORAL at 11:11

## 2023-11-08 RX ADMIN — OXYCODONE HYDROCHLORIDE 10 MG: 5 TABLET ORAL at 03:11

## 2023-11-08 RX ADMIN — IBUPROFEN 600 MG: 200 TABLET, FILM COATED ORAL at 06:11

## 2023-11-08 RX ADMIN — HYDROMORPHONE HYDROCHLORIDE 2 MG: 1 INJECTION, SOLUTION INTRAMUSCULAR; INTRAVENOUS; SUBCUTANEOUS at 10:11

## 2023-11-08 RX ADMIN — PRENATAL VIT W/ FE FUMARATE-FA TAB 27-0.8 MG 1 TABLET: 27-0.8 TAB at 08:11

## 2023-11-08 RX ADMIN — DOCUSATE SODIUM 200 MG: 100 CAPSULE, LIQUID FILLED ORAL at 07:11

## 2023-11-08 RX ADMIN — OXYCODONE HYDROCHLORIDE 10 MG: 5 TABLET ORAL at 07:11

## 2023-11-08 RX ADMIN — DOCUSATE SODIUM 200 MG: 100 CAPSULE, LIQUID FILLED ORAL at 08:11

## 2023-11-08 RX ADMIN — OXYCODONE HYDROCHLORIDE AND ACETAMINOPHEN 1 TABLET: 10; 325 TABLET ORAL at 02:11

## 2023-11-08 RX ADMIN — IBUPROFEN 600 MG: 200 TABLET, FILM COATED ORAL at 01:11

## 2023-11-08 RX ADMIN — OXYCODONE HYDROCHLORIDE AND ACETAMINOPHEN 1 TABLET: 10; 325 TABLET ORAL at 06:11

## 2023-11-08 RX ADMIN — DIPHENHYDRAMINE HYDROCHLORIDE 6.25 MG: 50 INJECTION INTRAMUSCULAR; INTRAVENOUS at 03:11

## 2023-11-08 RX ADMIN — DIPHENHYDRAMINE HYDROCHLORIDE 25 MG: 25 CAPSULE ORAL at 07:11

## 2023-11-08 RX ADMIN — ZOLPIDEM TARTRATE 5 MG: 5 TABLET, COATED ORAL at 11:11

## 2023-11-08 NOTE — PLAN OF CARE
[facility], [facility]  Discharge Assessment    Primary Care Provider: No, Primary Doctor   OB Screen completed and no needs identified at this time.  White board in room updated with contact information, and mother was encouraged to contact office if further needs arise.    OB Screen (most recent)       OB Screen - 23 1426          OB SCREEN    Assessment Type Discharge Planning Assessment     Source of Information patient     Received Prenatal Care Yes     Is this a teen pregnancy No     Is the baby in NICU Yes     Indication for adoption/[facility] No     Indication for DME/post-acute needs No     HIV (+) No     Any congenital  disorders No     Fetal demise/ death No

## 2023-11-08 NOTE — LACTATION NOTE
11/07/23 1510   Maternal Assessment   Breast Density soft   Areola Bilateral:;elastic   Nipples Bilateral:;everted   Breast Pumping   Breast Pumping Interventions early pumping promoted   Breast Pumping hand expression utilized     Assisted with hand expression. Collected 3ml colostrum in syringe. Reviewed NICU pumping instructions and plan to begin pumping when patient ready. Patient verbalizes understanding of all instructions with good recall.    Mother was taught hand expression of breastmilk/colostrum. She was instructed to:  Sit upright and lean forward, if possible.  When feasible, apply warm, wet compress over breasts for a few minutes.   Perform gentle breast massage.  Form a C with her hand and place it about 1 inch back from the areola with the nipple centered between her index finger and her thumb.  Press, compress, relax:  Using her finger and thumb, apply pressure in an inward direction toward the breast without stretching the tissue, compress the breast tissue between her finger and thumb, then relax her finger and thumb. Repeat process for a few minutes.  Rotate placement of finger and thumb on the breasts to facilitate emptying.  Collect expressed breastmilk/colostrum with a spoon or cup and feed immediately to the baby, if able.  If unable to feed immediately, place breastmilk/colostrum directly into a sterile storage container for later use. Place the babys breast milk label (with the date and time of collection and the names of mother's medications) on the container. Reviewed proper handling and storage of expressed breastmilk.   Patient effectively return demonstrated and verbalized understanding.

## 2023-11-08 NOTE — PLAN OF CARE
Patient remained stable this shift, VSS, fundus firm and midline, lochia small, *- flatus. Pain managed with meds per MAR. Dressing C/D/I. Sotelo catheter removed, patient has been up to nicu via wheelchair without difficulty. POC discussed, all questions answered, denies needs.

## 2023-11-08 NOTE — SUBJECTIVE & OBJECTIVE
Interval History: POD#1    She is doing well this morning. She is tolerating a regular diet without nausea or vomiting. She is voiding spontaneously. She is ambulating. She has not passed flatus, and has not a BM. Vaginal bleeding is mild. She denies fever or chills. Abdominal pain is moderate and controlled with oral medications. She Is breastfeeding. She desires circumcision for her male baby: not applicable.  Baby in NICU, TTN  Objective:     Vital Signs (Most Recent):  Temp: 98.4 °F (36.9 °C) (11/08/23 0720)  Pulse: 70 (11/08/23 0720)  Resp: 18 (11/08/23 0751)  BP: 103/63 (11/08/23 0720)  SpO2: 100 % (11/08/23 0720) Vital Signs (24h Range):  Temp:  [97.7 °F (36.5 °C)-98.6 °F (37 °C)] 98.4 °F (36.9 °C)  Pulse:  [] 70  Resp:  [16-18] 18  SpO2:  [96 %-100 %] 100 %  BP: (103-122)/(57-83) 103/63     Weight: 134.7 kg (297 lb)  Body mass index is 40.28 kg/m².      Intake/Output Summary (Last 24 hours) at 11/8/2023 0826  Last data filed at 11/8/2023 0600  Gross per 24 hour   Intake 500 ml   Output 3512 ml   Net -3012 ml         Significant Labs:  Lab Results   Component Value Date    GROUPTRH AB POS 07/08/2021    HEPBSAG Negative 09/03/2023    STREPBCULT positive 09/20/2023     Recent Labs   Lab 11/07/23  1911   HGB 8.5*   HCT 27.3*       I have personallly reviewed all pertinent lab results from the last 24 hours.    Physical Exam  VSSAF  PE stable    Review of Systems

## 2023-11-08 NOTE — PROGRESS NOTES
[facility]  Obstetrics  Postpartum Progress Note    Patient Name: Rina Hearn  MRN: [id number]  Admission Date: 2023  Hospital Length of Stay: 1 days  Attending Physician: Becca Eastman MD  Primary Care Provider: Brie, Primary Doctor    Subjective:     Principal Problem:Delivery by elective  section    Hospital Course:  No notes on file    Interval History: POD#1    She is doing well this morning. She is tolerating a regular diet without nausea or vomiting. She is voiding spontaneously. She is ambulating. She has not passed flatus, and has not a BM. Vaginal bleeding is mild. She denies fever or chills. Abdominal pain is moderate and controlled with oral medications. She Is breastfeeding. She desires circumcision for her male baby: not applicable.  Baby in NICU, TTN  Objective:     Vital Signs (Most Recent):  Temp: 98.4 °F (36.9 °C) (23)  Pulse: 70 (23)  Resp: 18 (23 0751)  BP: 103/63 (23)  SpO2: 100 % (23) Vital Signs (24h Range):  Temp:  [97.7 °F (36.5 °C)-98.6 °F (37 °C)] 98.4 °F (36.9 °C)  Pulse:  [] 70  Resp:  [16-18] 18  SpO2:  [96 %-100 %] 100 %  BP: (103-122)/(57-83) 103/63     Weight: 134.7 kg (297 lb)  Body mass index is 40.28 kg/m².      Intake/Output Summary (Last 24 hours) at 2023 0826  Last data filed at 2023 0600  Gross per 24 hour   Intake 500 ml   Output 3512 ml   Net -3012 ml         Significant Labs:  Lab Results   Component Value Date    GROUPTRH AB POS 2021    HEPBSAG Negative 2023    STREPBCULT positive 2023     Recent Labs   Lab 23  191   HGB 8.5*   HCT 27.3*       I have personallly reviewed all pertinent lab results from the last 24 hours.    Physical Exam  VSSAF  PE stable    Review of Systems    Assessment/Plan:     28 y.o. female  for:    No notes have been filed under this hospital service.  Service: Obstetrics and Gynecology      Disposition: As patient meets  milestones, will plan to discharge POD#4 , dependant on baby.  Nubain for itch.    Maryse Marquis MD  Obstetrics  [facility]

## 2023-11-09 PROCEDURE — 25000003 PHARM REV CODE 250: Performed by: OBSTETRICS & GYNECOLOGY

## 2023-11-09 PROCEDURE — 12000002 HC ACUTE/MED SURGE SEMI-PRIVATE ROOM

## 2023-11-09 RX ORDER — BUSPIRONE HYDROCHLORIDE 5 MG/1
5 TABLET ORAL 2 TIMES DAILY
Status: DISCONTINUED | OUTPATIENT
Start: 2023-11-09 | End: 2023-11-11 | Stop reason: HOSPADM

## 2023-11-09 RX ADMIN — BUSPIRONE HYDROCHLORIDE 5 MG: 5 TABLET ORAL at 08:11

## 2023-11-09 RX ADMIN — DOCUSATE SODIUM 200 MG: 100 CAPSULE, LIQUID FILLED ORAL at 08:11

## 2023-11-09 RX ADMIN — OXYCODONE HYDROCHLORIDE AND ACETAMINOPHEN 1 TABLET: 10; 325 TABLET ORAL at 08:11

## 2023-11-09 RX ADMIN — OXYCODONE HYDROCHLORIDE AND ACETAMINOPHEN 1 TABLET: 10; 325 TABLET ORAL at 04:11

## 2023-11-09 RX ADMIN — SIMETHICONE 80 MG: 80 TABLET, CHEWABLE ORAL at 02:11

## 2023-11-09 RX ADMIN — IBUPROFEN 600 MG: 200 TABLET, FILM COATED ORAL at 07:11

## 2023-11-09 RX ADMIN — OXYCODONE HYDROCHLORIDE AND ACETAMINOPHEN 1 TABLET: 10; 325 TABLET ORAL at 12:11

## 2023-11-09 RX ADMIN — MUPIROCIN 1 G: 20 OINTMENT TOPICAL at 08:11

## 2023-11-09 RX ADMIN — OXYCODONE HYDROCHLORIDE AND ACETAMINOPHEN 1 TABLET: 10; 325 TABLET ORAL at 05:11

## 2023-11-09 RX ADMIN — PRENATAL VIT W/ FE FUMARATE-FA TAB 27-0.8 MG 1 TABLET: 27-0.8 TAB at 08:11

## 2023-11-09 RX ADMIN — IBUPROFEN 600 MG: 200 TABLET, FILM COATED ORAL at 12:11

## 2023-11-09 RX ADMIN — MAGNESIUM HYDROXIDE 2400 MG: 400 SUSPENSION ORAL at 03:11

## 2023-11-09 RX ADMIN — SIMETHICONE 80 MG: 80 TABLET, CHEWABLE ORAL at 08:11

## 2023-11-09 RX ADMIN — SIMETHICONE 80 MG: 80 TABLET, CHEWABLE ORAL at 03:11

## 2023-11-09 NOTE — ASSESSMENT & PLAN NOTE
S/p repeat c/section POD#2  Baby in NICU, doing well  Anxiety    Continue post op care  Discussed buspar for anxiety - pt wants to try it.

## 2023-11-09 NOTE — PROGRESS NOTES
Former[facility]  Obstetrics  Labor Progress Note    Patient Name: Rina Hearn  MRN: [id number]  Admission Date: 2023  Hospital Length of Stay: 2 days  Attending Physician: Becca Eastman MD  Primary Care Provider: No, Primary Doctor    Subjective:     Principal Problem:Delivery by elective  section    Hospital Course:  No notes on file    Interval History: s/p repeat c/section POD #2    She is doing well this morning. She is tolerating a regular diet without nausea or vomiting. She is voiding spontaneously. She is ambulating. She has passed flatus, and has not a BM. Vaginal bleeding is mild. She denies fever or chills. Abdominal pain is mild and controlled with oral medications. She Is breastfeeding. She desires circumcision for her male baby: yes.    Baby is in NICU with spontaneous pneumothorax, doing well.  The patient c/o anxiety due to having baby in NICU, and she wants to start medication to get ahead of it before it gets bad.  She denies depression, but says she gets sad and overwhelmed due to the anxiety    Objective:     Vital Signs (Most Recent):  Temp: 98.3 °F (36.8 °C) (23 1110)  Pulse: 80 (23 1110)  Resp: 18 (23 1257)  BP: 126/84 (23 1110)  SpO2: 100 % (23 1110) Vital Signs (24h Range):  Temp:  [97.6 °F (36.4 °C)-98.3 °F (36.8 °C)] 98.3 °F (36.8 °C)  Pulse:  [78-87] 80  Resp:  [16-18] 18  SpO2:  [98 %-100 %] 100 %  BP: ()/(65-84) 126/84     Weight: 134.7 kg (297 lb)  Body mass index is 40.28 kg/m².    No intake or output data in the 24 hours ending 23 1328      Significant Labs:  Lab Results   Component Value Date    GROUPTRH AB POS 2021    HEPBSAG Negative 2023    STREPBCULT positive 2023     Recent Labs   Lab 23  1911   HGB 8.5*   HCT 27.3*       I have personallly reviewed all pertinent lab results from the last 24 hours.    Physical Exam  Gen - NAD  Uterus - firm below umbilicus, non tender  Ext - no edema,  no calf tenderness  Incision - mepilex in place, dry     Review of Systems    Assessment/Plan:     28 y.o. female  at 39w0d for:    * Delivery by elective  section  S/p repeat c/section POD#2  Baby in NICU, doing well  Anxiety    Continue post op care  Discussed buspar for anxiety - pt wants to try it.             Becca Eastman MD  Obstetrics  [facility]

## 2023-11-09 NOTE — SUBJECTIVE & OBJECTIVE
Interval History: s/p repeat c/section POD #2    She is doing well this morning. She is tolerating a regular diet without nausea or vomiting. She is voiding spontaneously. She is ambulating. She has passed flatus, and has not a BM. Vaginal bleeding is mild. She denies fever or chills. Abdominal pain is mild and controlled with oral medications. She Is breastfeeding. She desires circumcision for her male baby: yes.    Baby is in NICU with spontaneous pneumothorax, doing well.  The patient c/o anxiety due to having baby in NICU, and she wants to start medication to get ahead of it before it gets bad.  She denies depression, but says she gets sad and overwhelmed due to the anxiety    Objective:     Vital Signs (Most Recent):  Temp: 98.3 °F (36.8 °C) (11/09/23 1110)  Pulse: 80 (11/09/23 1110)  Resp: 18 (11/09/23 1257)  BP: 126/84 (11/09/23 1110)  SpO2: 100 % (11/09/23 1110) Vital Signs (24h Range):  Temp:  [97.6 °F (36.4 °C)-98.3 °F (36.8 °C)] 98.3 °F (36.8 °C)  Pulse:  [78-87] 80  Resp:  [16-18] 18  SpO2:  [98 %-100 %] 100 %  BP: ()/(65-84) 126/84     Weight: 134.7 kg (297 lb)  Body mass index is 40.28 kg/m².    No intake or output data in the 24 hours ending 11/09/23 1328      Significant Labs:  Lab Results   Component Value Date    GROUPTRH AB POS 07/08/2021    HEPBSAG Negative 09/03/2023    STREPBCULT positive 09/20/2023     Recent Labs   Lab 11/07/23  1911   HGB 8.5*   HCT 27.3*       I have personallly reviewed all pertinent lab results from the last 24 hours.    Physical Exam  Gen - NAD  Uterus - firm below umbilicus, non tender  Ext - no edema, no calf tenderness  Incision - mepilex in place, dry     Review of Systems

## 2023-11-10 PROCEDURE — 25000003 PHARM REV CODE 250: Performed by: OBSTETRICS & GYNECOLOGY

## 2023-11-10 PROCEDURE — 12000002 HC ACUTE/MED SURGE SEMI-PRIVATE ROOM

## 2023-11-10 PROCEDURE — 25000003 PHARM REV CODE 250: Performed by: GENERAL PRACTICE

## 2023-11-10 RX ADMIN — DOCUSATE SODIUM 200 MG: 100 CAPSULE, LIQUID FILLED ORAL at 10:11

## 2023-11-10 RX ADMIN — IBUPROFEN 600 MG: 200 TABLET, FILM COATED ORAL at 12:11

## 2023-11-10 RX ADMIN — ZOLPIDEM TARTRATE 5 MG: 5 TABLET, COATED ORAL at 10:11

## 2023-11-10 RX ADMIN — PRENATAL VIT W/ FE FUMARATE-FA TAB 27-0.8 MG 1 TABLET: 27-0.8 TAB at 08:11

## 2023-11-10 RX ADMIN — IBUPROFEN 600 MG: 200 TABLET, FILM COATED ORAL at 11:11

## 2023-11-10 RX ADMIN — ZOLPIDEM TARTRATE 5 MG: 5 TABLET, COATED ORAL at 12:11

## 2023-11-10 RX ADMIN — OXYCODONE HYDROCHLORIDE AND ACETAMINOPHEN 1 TABLET: 10; 325 TABLET ORAL at 10:11

## 2023-11-10 RX ADMIN — MUPIROCIN 1 G: 20 OINTMENT TOPICAL at 08:11

## 2023-11-10 RX ADMIN — BUSPIRONE HYDROCHLORIDE 5 MG: 5 TABLET ORAL at 08:11

## 2023-11-10 RX ADMIN — OXYCODONE HYDROCHLORIDE AND ACETAMINOPHEN 1 TABLET: 10; 325 TABLET ORAL at 08:11

## 2023-11-10 RX ADMIN — IBUPROFEN 600 MG: 200 TABLET, FILM COATED ORAL at 05:11

## 2023-11-10 RX ADMIN — OXYCODONE HYDROCHLORIDE AND ACETAMINOPHEN 1 TABLET: 10; 325 TABLET ORAL at 03:11

## 2023-11-10 RX ADMIN — BUSPIRONE HYDROCHLORIDE 5 MG: 5 TABLET ORAL at 10:11

## 2023-11-10 RX ADMIN — OXYCODONE HYDROCHLORIDE AND ACETAMINOPHEN 1 TABLET: 10; 325 TABLET ORAL at 12:11

## 2023-11-10 RX ADMIN — DOCUSATE SODIUM 200 MG: 100 CAPSULE, LIQUID FILLED ORAL at 08:11

## 2023-11-10 NOTE — PROGRESS NOTES
"[facility]  Obstetrics  Postpartum Progress Note    Patient Name: Rina Hearn  MRN: [id number]  Admission Date: 2023  Hospital Length of Stay: 3 days  Attending Physician: Becca Eastman MD  Primary Care Provider: No, Primary Doctor    Subjective:     Principal Problem:Delivery by elective  section    Hospital Course:  No notes on file    Interval History: s/p repeat c/section POD#3    She is doing well this morning. She is tolerating a regular diet without nausea or vomiting. She is voiding spontaneously. She is ambulating. She has passed flatus, and has a BM. Vaginal bleeding is mild. She denies fever or chills. Abdominal pain is mild and controlled with oral medications. She Is breastfeeding. She desires circumcision for her male baby: yes.    Objective:     Vital Signs (Most Recent):  Temp: 98.5 °F (36.9 °C) (11/10/23 1100)  Pulse: 90 (11/10/23 1100)  Resp: 17 (11/10/23 1100)  BP: 126/82 (11/10/23 1100)  SpO2: 100 % (11/10/23 1100) Vital Signs (24h Range):  Temp:  [97.6 °F (36.4 °C)-98.5 °F (36.9 °C)] 98.5 °F (36.9 °C)  Pulse:  [77-95] 90  Resp:  [16-18] 17  SpO2:  [98 %-100 %] 100 %  BP: (110-126)/(65-82) 126/82     Weight: 134.7 kg (297 lb)  Body mass index is 40.28 kg/m².    No intake or output data in the 24 hours ending 11/10/23 1423      Significant Labs:  Lab Results   Component Value Date    GROUPTRH AB POS 2021    HEPBSAG Negative 2023    STREPBCULT positive 2023     No results for input(s): "HGB", "HCT" in the last 48 hours.    I have personallly reviewed all pertinent lab results from the last 24 hours.    Physical Exam  Gen - NAD  Uterus - firm below umbilicus, non tender  Ext - no edema, no calf tenderness  Incision - mepilex in place, dry     Review of Systems    Assessment/Plan:     28 y.o. female  for:    * Delivery by elective  section  S/p repeat c/section POD#3  Baby in NICU, doing well  Anxiety    Continue post op care  Continue " buspar          .    Becca Eastman MD  Obstetrics  [facility]

## 2023-11-10 NOTE — ASSESSMENT & PLAN NOTE
S/p repeat c/section POD#3  Baby in NICU, doing well  Anxiety    Continue post op care  Continue buspar

## 2023-11-10 NOTE — SUBJECTIVE & OBJECTIVE
"Interval History: s/p repeat c/section POD#3    She is doing well this morning. She is tolerating a regular diet without nausea or vomiting. She is voiding spontaneously. She is ambulating. She has passed flatus, and has a BM. Vaginal bleeding is mild. She denies fever or chills. Abdominal pain is mild and controlled with oral medications. She Is breastfeeding. She desires circumcision for her male baby: yes.    Objective:     Vital Signs (Most Recent):  Temp: 98.5 °F (36.9 °C) (11/10/23 1100)  Pulse: 90 (11/10/23 1100)  Resp: 17 (11/10/23 1100)  BP: 126/82 (11/10/23 1100)  SpO2: 100 % (11/10/23 1100) Vital Signs (24h Range):  Temp:  [97.6 °F (36.4 °C)-98.5 °F (36.9 °C)] 98.5 °F (36.9 °C)  Pulse:  [77-95] 90  Resp:  [16-18] 17  SpO2:  [98 %-100 %] 100 %  BP: (110-126)/(65-82) 126/82     Weight: 134.7 kg (297 lb)  Body mass index is 40.28 kg/m².    No intake or output data in the 24 hours ending 11/10/23 1423      Significant Labs:  Lab Results   Component Value Date    [facility]    HEPBSAG Negative 09/03/2023    STREPBCULT positive 09/20/2023     No results for input(s): "HGB", "HCT" in the last 48 hours.    I have personallly reviewed all pertinent lab results from the last 24 hours.    Physical Exam  Gen - NAD  Uterus - firm below umbilicus, non tender  Ext - no edema, no calf tenderness  Incision - mepilex in place, dry     Review of Systems  "

## 2023-11-10 NOTE — LACTATION NOTE
Mom reports pumping every 3 hours. Reinforced cleaning & sanitizing pump parts. Assistance offered prn. Mom verbalized understanding.

## 2023-11-11 VITALS
OXYGEN SATURATION: 99 % | WEIGHT: 293 LBS | SYSTOLIC BLOOD PRESSURE: 113 MMHG | RESPIRATION RATE: 18 BRPM | DIASTOLIC BLOOD PRESSURE: 74 MMHG | HEART RATE: 83 BPM | BODY MASS INDEX: 39.68 KG/M2 | TEMPERATURE: 98 F | HEIGHT: 72 IN

## 2023-11-11 PROCEDURE — 25000003 PHARM REV CODE 250: Performed by: OBSTETRICS & GYNECOLOGY

## 2023-11-11 PROCEDURE — 90471 IMMUNIZATION ADMIN: CPT | Performed by: OBSTETRICS & GYNECOLOGY

## 2023-11-11 PROCEDURE — 63600175 PHARM REV CODE 636 W HCPCS: Performed by: OBSTETRICS & GYNECOLOGY

## 2023-11-11 PROCEDURE — 90715 TDAP VACCINE 7 YRS/> IM: CPT | Performed by: OBSTETRICS & GYNECOLOGY

## 2023-11-11 RX ORDER — BUSPIRONE HYDROCHLORIDE 5 MG/1
5 TABLET ORAL 2 TIMES DAILY
Qty: 60 TABLET | Refills: 1 | Status: SHIPPED | OUTPATIENT
Start: 2023-11-11 | End: 2024-02-20

## 2023-11-11 RX ORDER — OXYCODONE AND ACETAMINOPHEN 5; 325 MG/1; MG/1
1 TABLET ORAL EVERY 4 HOURS PRN
Qty: 28 TABLET | Refills: 0 | Status: SHIPPED | OUTPATIENT
Start: 2023-11-11 | End: 2024-02-20

## 2023-11-11 RX ORDER — IBUPROFEN 600 MG/1
600 TABLET ORAL EVERY 6 HOURS
Qty: 60 TABLET | Refills: 0 | Status: SHIPPED | OUTPATIENT
Start: 2023-11-11

## 2023-11-11 RX ADMIN — MUPIROCIN 1 G: 20 OINTMENT TOPICAL at 09:11

## 2023-11-11 RX ADMIN — IBUPROFEN 600 MG: 200 TABLET, FILM COATED ORAL at 12:11

## 2023-11-11 RX ADMIN — IBUPROFEN 600 MG: 200 TABLET, FILM COATED ORAL at 06:11

## 2023-11-11 RX ADMIN — OXYCODONE HYDROCHLORIDE AND ACETAMINOPHEN 1 TABLET: 10; 325 TABLET ORAL at 04:11

## 2023-11-11 RX ADMIN — DOCUSATE SODIUM 200 MG: 100 CAPSULE, LIQUID FILLED ORAL at 09:11

## 2023-11-11 RX ADMIN — CLOSTRIDIUM TETANI TOXOID ANTIGEN (FORMALDEHYDE INACTIVATED), CORYNEBACTERIUM DIPHTHERIAE TOXOID ANTIGEN (FORMALDEHYDE INACTIVATED), BORDETELLA PERTUSSIS TOXOID ANTIGEN (GLUTARALDEHYDE INACTIVATED), BORDETELLA PERTUSSIS FILAMENTOUS HEMAGGLUTININ ANTIGEN (FORMALDEHYDE INACTIVATED), BORDETELLA PERTUSSIS PERTACTIN ANTIGEN, AND BORDETELLA PERTUSSIS FIMBRIAE 2/3 ANTIGEN 0.5 ML: 5; 2; 2.5; 5; 3; 5 INJECTION, SUSPENSION INTRAMUSCULAR at 12:11

## 2023-11-11 RX ADMIN — BUSPIRONE HYDROCHLORIDE 5 MG: 5 TABLET ORAL at 09:11

## 2023-11-11 RX ADMIN — PRENATAL VIT W/ FE FUMARATE-FA TAB 27-0.8 MG 1 TABLET: 27-0.8 TAB at 09:11

## 2023-11-11 NOTE — DISCHARGE SUMMARY
[facility]  Obstetrics  Discharge Summary      Patient Name: Rina Hearn  MRN: [id number]  Admission Date: 2023  Hospital Length of Stay: 4 days  Discharge Date and Time:  2023 12:22 PM  Attending Physician: Becca Eastman MD   Discharging Provider: Haydee Maddox MD, MD   Primary Care Provider: No, Primary Doctor    Procedure(s) (LRB):   SECTION (N/A)     Hospital Course:   28-year-old  002 at 39 weeks presented for repeat  section.  Underwent an uncomplicated procedure.  Patient recovering well.  Baby in the NICU for respiratory distress, doing well.  On postop day 4 patient was meeting all discharge criteria.     Pain well controlled, tolerating regular diet, ambulating and voiding without difficulty, passing flatus, no heavy bleeding.  On BuSpar for anxiety.  VSS and exam reassuring.  Precautions reviewed with patient and she will follow up with Dr. Eastman.    Vitals:    11/10/23 1600 11/10/23 2100 11/10/23 2256 23 0623   BP: 119/62 110/74  113/74   BP Location:  Right arm  Right arm   Patient Position:  Sitting  Lying   Pulse: 92 72  83   Resp: 18 18 18 18   Temp: 97.7 °F (36.5 °C) 97.6 °F (36.4 °C)  97.8 °F (36.6 °C)   TempSrc: Oral Oral  Oral   SpO2: (!) 10% 99%  99%   Weight:       Height:            Exam:  NAD, AAO  Regular rate  Nonlabored respirations  Ab: fundus firm below the umbilicus, appropriate abdominal tenderness bandage clean/dry/intact.  : appropriate lochia  Extremities:  Nontender calves, no evidence of DVT        Final Active Diagnoses:    Diagnosis Date Noted POA    PRINCIPAL PROBLEM:  Delivery by elective  section [O82] 2023 Unknown      Problems Resolved During this Admission:      Significant Labs:        Lab Results   Component Value Date     GROUPTRH AB POS 2021     HEPBSAG Negative 2023     STREPBCULT positive 2023          Recent Labs   Lab 23  191   HGB 8.5*   HCT 27.3*        "      Feeding Method: both breast and bottle    Immunizations       Date Immunization Status Dose Route/Site Given by    23 MMR Incomplete 0.5 mL Subcutaneous/     23 Tdap Incomplete 0.5 mL Intramuscular/             Delivery:    Episiotomy:     Lacerations:     Repair suture:     Repair # of packets:     Blood loss (ml):       Birth information:  YOB: 2021   Time of birth: 1:25 PM   Sex: male   Delivery type: , Low Transverse   Gestational Age: 39w0d     Measurements    Weight: 3860 g  Weight (lbs): 8 lb 8.2 oz  Length: 53 cm  Length (in): 20.87"         Delivery Clinician: Delivery Providers    Delivering clinician: Becca Eastman MD          Additional  information:  Forceps:    Vacuum:    Breech:    Observed anomalies      Living?:     Apgars    Living status: Living  Apgar Component Scores:  1 min.:  5 min.:  10 min.:  15 min.:  20 min.:    Skin color:  0  0       Heart rate:  2  2       Reflex irritability:  1  2       Muscle tone:  1  2       Respiratory effort:  1  2       Total:  5  8       Apgars assigned by: ENEDINA SPEAR         Placenta: Delivered:       appearance    Pending Diagnostic Studies:       Procedure Component Value Units Date/Time    RPR [[id number]] Collected: 23 1201    Order Status: Sent Lab Status: In process Updated: 23 121    Specimen: Blood             Discharged Condition: good    Disposition: Home or Self Care    Follow Up:   Follow-up Information       Becca Eastman MD Follow up in 1 week(s).    Specialties: Obstetrics, Obstetrics and Gynecology  Why: Postpartum check, Postop check  Contact information:  [address]  [address]  [facility]  [facility] [address]  [phone]                           Patient Instructions:      Diet Adult Regular     Lifting restrictions   Order Comments: No more than 10lbs     Pelvic Rest     Notify your health care provider if you experience any of the " following:  temperature >100.4     Notify your health care provider if you experience any of the following:  persistent nausea and vomiting or diarrhea     Notify your health care provider if you experience any of the following:  severe uncontrolled pain     Notify your health care provider if you experience any of the following:  redness, tenderness, or signs of infection (pain, swelling, redness, odor or green/yellow discharge around incision site)     Notify your health care provider if you experience any of the following:  difficulty breathing or increased cough     Notify your health care provider if you experience any of the following:  severe persistent headache     Notify your health care provider if you experience any of the following:  worsening rash     Notify your health care provider if you experience any of the following:  persistent dizziness, light-headedness, or visual disturbances     Notify your health care provider if you experience any of the following:  increased confusion or weakness     Leave dressing on - Keep it clean, dry, and intact until clinic visit     Medications:  Current Discharge Medication List        START taking these medications    Details   busPIRone (BUSPAR) 5 MG Tab Take 1 tablet (5 mg total) by mouth 2 (two) times daily.  Qty: 60 tablet, Refills: 1      ibuprofen (ADVIL,MOTRIN) 600 MG tablet Take 1 tablet (600 mg total) by mouth every 6 (six) hours.  Qty: 60 tablet, Refills: 0      oxyCODONE-acetaminophen (PERCOCET) 5-325 mg per tablet Take 1 tablet by mouth every 4 (four) hours as needed for Pain.  Qty: 28 tablet, Refills: 0    Comments: Quantity prescribed more than 7 day supply? No           CONTINUE these medications which have NOT CHANGED    Details   ferrous sulfate (FEOSOL) Tab tablet Take 1 tablet by mouth once daily.      levocetirizine (XYZAL) 5 MG tablet Take 1 tablet (5 mg total) by mouth nightly as needed for Allergies.  Qty: 7 tablet, Refills: 0    Associated  Diagnoses: Viral URI           STOP taking these medications       acetaminophen (TYLENOL) 500 MG tablet Comments:   Reason for Stopping:         azelastine (ASTELIN) 137 mcg (0.1 %) nasal spray Comments:   Reason for Stopping:         benzocaine-menthoL 6-10 mg lozenge Comments:   Reason for Stopping:               Haydee Mdadox MD, MD  Obstetrics  [facility]

## 2023-11-11 NOTE — DISCHARGE INSTRUCTIONS
Pelvic rest for 6 weeks (no sex, tampons, douching, nothing in the vagina)   You can experience vaginal bleeding on and off for up to 6 weeks, it will gradually get lighter and the color will change from bright red to a brownish discharge towards the end.      Activity:   NO strenuous activities or exercising for 6 weeks. Do not /lift anything over 15 pounds and no heavy housework or cleaning for 6 weeks. Limit stair climbing to twice a day during the first 2 weeks.   NO driving for 4 weeks. You may take short car trips but do not drive.   You may shower ONLY for the first 2 weeks, after 2 weeks you can soak in a bathtub. Use a mild soap, no heavy perfumes or fragrances to avoid irritation.   Walking frequently following a  delivery promotes healing and decreases pain associated with gas.   If constipation develops: You may take Colace (stool softener), Milk of Magnesia, Dulcolax or Miralax. All of these medications are sold over the counter.   Incision Care:   Clean your incision with mild soap & warm water only- do not scrub- let warm water run over it, then pat dry.      Pain Relief:   You may take Motrin for mild pain & uterine cramping.      Emotional Changes:   You may experience baby blues after delivery. You may feel let down, anxious and cry easily. This is normal. These feelings can begin 2-3 days after delivery and usually disappear in about a week or two. Prolonged sadness may indicate postpartum depression.     Call your doctor for any of the following:   Difficulty breathing, problems with any of your medications, inability to eat.   Foul smelling vaginal discharge.   If you notice pus-like drainage from your incision, if your incision or the area around it becomes hot or swollen, or if you notice a foul smelling odor.   Temperature above 100.4.   Heavy vaginal bleeding. All women bleed different after delivery and each delivery is different. Heavy bleeding consists of saturating a  shamir pad in a 1 hour time period. Passing clots are normal, if you pass a blood clot larger than the size of a golf ball call your doctor's office.   If you experience pain in your legs/calves, if one leg increases in size and becomes swollen or becomes hot to touch or discolored.   Crying or periods of sadness beyond 2 weeks.     If you are breast feeding:   Wash your breasts with mild soap and warm water.   You should wear a supportive bra.   You should continue to take a prenatal vitamin for 6 weeks or until breastfeeding is discontinued.   If nipples are sore, apply a few drops of breast milk after nursing and let air dry or you can use Lanolin cream.   If breasts are engorged, apply warmth and express milk.  [facility] lactation consultant is available at [phone] for breastfeeding assisstance    If you are not breastfeeding:   Wear a tight bra and do not stimulate your breasts. Avoid handling your breasts and do not express milk. You may apply ice packs or cabbage leaves to relieve discomfort from engorgement. If your breasts become warm to touch, reddened or lumps develop call your doctor.

## 2023-11-12 NOTE — LACTATION NOTE
Reviewed NICU pumping discharge instructions and engorgement precautions. Encouraged to pump at least 8 times in 24 hours to maintain adequate milk production. Patient verbalizes understanding of all instructions with good recall.

## 2023-11-13 ENCOUNTER — PATIENT OUTREACH (OUTPATIENT)
Dept: ADMINISTRATIVE | Facility: CLINIC | Age: 28
End: 2023-11-13
Payer: COMMERCIAL

## 2024-01-01 PROBLEM — N39.0 UTI (URINARY TRACT INFECTION): Status: RESOLVED | Noted: 2023-09-27 | Resolved: 2024-01-01

## 2024-02-17 ENCOUNTER — ON-DEMAND VIRTUAL (OUTPATIENT)
Dept: URGENT CARE | Facility: CLINIC | Age: 29
End: 2024-02-17
Payer: COMMERCIAL

## 2024-02-17 DIAGNOSIS — H66.92 LEFT OTITIS MEDIA, UNSPECIFIED OTITIS MEDIA TYPE: Primary | ICD-10-CM

## 2024-02-17 PROCEDURE — 99213 OFFICE O/P EST LOW 20 MIN: CPT | Mod: 95,,,

## 2024-02-17 RX ORDER — AMOXICILLIN 875 MG/1
875 TABLET, FILM COATED ORAL EVERY 12 HOURS
Qty: 20 TABLET | Refills: 0 | Status: SHIPPED | OUTPATIENT
Start: 2024-02-17 | End: 2024-02-20

## 2024-02-17 NOTE — PROGRESS NOTES
Subjective:      Patient ID: Rina Hearn is a 28 y.o. female.    Vitals:  vitals were not taken for this visit.     Chief Complaint: Otalgia      Visit Type: TELE AUDIOVISUAL    Present with the patient at the time of consultation: TELEMED PRESENT WITH PATIENT: None    Past Medical History:   Diagnosis Date    PCOS (polycystic ovarian syndrome)      Past Surgical History:   Procedure Laterality Date    BARIATRIC SURGERY  2018    Gastric sleeve     SECTION N/A 02/10/2022    Procedure:  SECTION  (PRIMARY)   (BREECH);  Surgeon: Becca Eastman MD;  Location: [facility] L&D;  Service: OB/GYN;  Laterality: N/A;     SECTION N/A 2023    Procedure:  SECTION;  Surgeon: Becca Eastman MD;  Location: [facility] L&D;  Service: OB/GYN;  Laterality: N/A;    CHOLECYSTECTOMY  2019    LAPAROSCOPIC APPENDECTOMY N/A 2022    Procedure: APPENDECTOMY, LAPAROSCOPIC;  Surgeon: Sanjay Lopez III, MD;  Location: [facility] OR;  Service: General;  Laterality: N/A;     Review of patient's allergies indicates:  No Known Allergies  Current Outpatient Medications on File Prior to Visit   Medication Sig Dispense Refill    busPIRone (BUSPAR) 5 MG Tab Take 1 tablet (5 mg total) by mouth 2 (two) times daily. 60 tablet 1    ferrous sulfate (FEOSOL) Tab tablet Take 1 tablet by mouth once daily.      ibuprofen (ADVIL,MOTRIN) 600 MG tablet Take 1 tablet (600 mg total) by mouth every 6 (six) hours. 60 tablet 0    levocetirizine (XYZAL) 5 MG tablet Take 1 tablet (5 mg total) by mouth nightly as needed for Allergies. 7 tablet 0    oxyCODONE-acetaminophen (PERCOCET) 5-325 mg per tablet Take 1 tablet by mouth every 4 (four) hours as needed for Pain. 28 tablet 0     No current facility-administered medications on file prior to visit.     History reviewed. No pertinent family history.    Medications Ordered                [id number] - [place] - [address]   [address] [address]     Telephone: [phone]   Fax: [phone]   Hours: Not open 24 hours                         E-Prescribed (1 of 1)              amoxicillin (AMOXIL) 875 MG tablet    Sig: Take 1 tablet (875 mg total) by mouth every 12 (twelve) hours. for 10 days       Start: 2/17/24     Quantity: 20 tablet Refills: 0                           Ohs Peq Odvv Intake    2/17/2024  8:54 AM CST - Filed by Patient   What is your current physical address in the event of a medical emergency? [address]   Are you able to take your vital signs? No   Please attach any relevant images or files          Patient states that for the past 3 days she has been having left ear pain with a headache. Patient states that it feels like she has an ear infection. Patient denies any other symptoms a this time     Sinus Problem  Associated symptoms include ear pain.       Constitution: Negative.   HENT:  Positive for ear pain.    Neck: neck negative.   Cardiovascular: Negative.    Eyes: Negative.    Respiratory: Negative.     Gastrointestinal: Negative.    Endocrine: negative.   Genitourinary: Negative.    Musculoskeletal: Negative.    Skin: Negative.    Allergic/Immunologic: Negative.    Neurological: Negative.    Hematologic/Lymphatic: Negative.    Psychiatric/Behavioral: Negative.          Objective:   The physical exam was conducted virtually.  Physical Exam   Constitutional: She is oriented to person, place, and time.   HENT:   Head: Normocephalic and atraumatic.   Nose: Nose normal.   Eyes: Conjunctivae are normal. Pupils are equal, round, and reactive to light. Extraocular movement intact   Neck: Neck supple.   Pulmonary/Chest: Effort normal.   Abdominal: Normal appearance.   Musculoskeletal: Normal range of motion.         General: Normal range of motion.   Neurological: no focal deficit. She is alert, oriented to person, place, and time and at baseline.   Skin: Skin is warm.   Psychiatric: Her behavior is normal. Mood, judgment and thought content  normal.       Assessment:     1. Left otitis media, unspecified otitis media type        Plan:       Left otitis media, unspecified otitis media type  -     amoxicillin (AMOXIL) 875 MG tablet; Take 1 tablet (875 mg total) by mouth every 12 (twelve) hours. for 10 days  Dispense: 20 tablet; Refill: 0

## 2024-02-17 NOTE — PATIENT INSTRUCTIONS
You must understand that you've received an Urgent Care treatment only and that you may be released before all your medical problems are known or treated. You, the patient, will arrange for follow up care as instructed.  Follow up with your PCP or specialty clinic as directed in the next 1-2 weeks if not improved or as needed.  You can call [phone] to schedule an appointment with the appropriate provider.  If your condition worsens we recommend that you receive another evaluation at the emergency room immediately or contact your primary medical clinics after hours call service to discuss your concerns.  Please return here or go to the Emergency Department for any concerns or worsening of condition.  Please if you smoke please consider quitting. [facility] Smoke cessation hotline number is [phone], available at this number is free counseling and medications to live a healthier life!         If you were prescribed a narcotic or controlled medication, do not drive or operate heavy equipment or machinery while taking these medications.

## 2024-02-18 NOTE — PROGRESS NOTES
SUBJECTIVE:    Patient ID: Rina Hearn is a 28 y.o. female.    Chief Complaint: Establish Care (Flu vaccine declined) and postpartum depression    HPI    New patient    Her prior physician--she moved to [place] in 2021-had one child and was pregnant with the second-now has three children-currently on FMLA from -children in childcare-to return to work April 8 with childcare now being planned    Post partem depression-probably more than just post partem-she has been on a variety of meds including effexor, lexapro, -zoloft-she was given xanax for panics and ambien for sleep -she was on only buspar 5 mg bid    She has PCOS-has dealt with it since age 16-associated with depression and anxiety-has had gastric sleeve surgery and lost 100 pounds but a year later and has been pregnant off and on since-she gained back some 45 pounds-she feels her anxiety is severe and she is not eating properly due to panic attacks-she describes feeling stuck and starts to worry about what may happen if she does everything due to excessive worry-had recent panic attack with crying, couldn't breathe, could not relax--past med has helped-she also wants to get back on buspar for baseline anxiety but it does not help the acute episodes-    Past treatment with spironolactone and metformin but not cimetidine     Last 3 sets of Vitals        11/7/2023    11:00 AM 11/7/2023     2:23 PM 2/20/2024     4:51 PM   Vitals - 1 value per visit   SYSTOLIC 110  110   DIASTOLIC 69  70   Pulse 105  70   Temp  97.7 °F (36.5 °C) 98.6 °F (37 °C)   Resp 18  16   SPO2 99 %  99 %   Weight (lb)   282   Weight (kg)   127.914   Height   6' (1.829 m)   BMI (Calculated)   38.2   Pain Score   Zero          Admission on 11/07/2023, Discharged on 11/11/2023   Component Date Value Ref Range Status    Benzodiazepines 11/07/2023 Negative  Negative Final    Cocaine (Metab.) 11/07/2023 Negative  Negative Final    Opiate Scrn, Ur 11/07/2023 Negative  Negative Final     Barbiturate Screen, Ur 11/07/2023 Negative  Negative Final    Amphetamine Screen, Ur 11/07/2023 Negative  Negative Final    THC 11/07/2023 Negative  Negative Final    Phencyclidine 11/07/2023 Negative  Negative Final    Creatinine, Urine 11/07/2023 293.3  15.0 - 325.0 mg/dL Final    Toxicology Information 11/07/2023 SEE COMMENT   Final    BUPRENORPHINE 11/07/2023 Negative   Final    Creatinine, Urine 11/07/2023 293.3  15.0 - 325.0 mg/dL Final    Indirect Nae 11/07/2023 NEG   Final    WBC 11/07/2023 7.62  3.90 - 12.70 K/uL Final    RBC 11/07/2023 3.51 (L)  4.00 - 5.40 M/uL Final    Hemoglobin 11/07/2023 8.6 (L)  12.0 - 16.0 g/dL Final    Hematocrit 11/07/2023 27.2 (L)  37.0 - 48.5 % Final    MCV 11/07/2023 78 (L)  82 - 98 fL Final    MCH 11/07/2023 24.5 (L)  27.0 - 31.0 pg Final    MCHC 11/07/2023 31.6 (L)  32.0 - 36.0 g/dL Final    RDW 11/07/2023 15.9 (H)  11.5 - 14.5 % Final    Platelets 11/07/2023 171  150 - 450 K/uL Final    MPV 11/07/2023 10.8  9.2 - 12.9 fL Final    Immature Granulocytes 11/07/2023 0.5  0.0 - 0.5 % Final    Gran # (ANC) 11/07/2023 5.7  1.8 - 7.7 K/uL Final    Immature Grans (Abs) 11/07/2023 0.04  0.00 - 0.04 K/uL Final    Lymph # 11/07/2023 1.4  1.0 - 4.8 K/uL Final    Mono # 11/07/2023 0.4  0.3 - 1.0 K/uL Final    Eos # 11/07/2023 0.0  0.0 - 0.5 K/uL Final    Baso # 11/07/2023 0.01  0.00 - 0.20 K/uL Final    nRBC 11/07/2023 0  0 /100 WBC Final    Gran % 11/07/2023 75.1 (H)  38.0 - 73.0 % Final    Lymph % 11/07/2023 18.4  18.0 - 48.0 % Final    Mono % 11/07/2023 5.8  4.0 - 15.0 % Final    Eosinophil % 11/07/2023 0.1  0.0 - 8.0 % Final    Basophil % 11/07/2023 0.1  0.0 - 1.9 % Final    Differential Method 11/07/2023 Automated   Final    ABO 11/07/2023 AB   Final    Rh Type 11/07/2023 POS   Final    WBC 11/07/2023 9.10  3.90 - 12.70 K/uL Final    RBC 11/07/2023 3.44 (L)  4.00 - 5.40 M/uL Final    Hemoglobin 11/07/2023 8.5 (L)  12.0 - 16.0 g/dL Final    Hematocrit 11/07/2023 27.3 (L)  37.0 -  48.5 % Final    MCV 11/07/2023 79 (L)  82 - 98 fL Final    MCH 11/07/2023 24.7 (L)  27.0 - 31.0 pg Final    MCHC 11/07/2023 31.1 (L)  32.0 - 36.0 g/dL Final    RDW 11/07/2023 15.7 (H)  11.5 - 14.5 % Final    Platelets 11/07/2023 176  150 - 450 K/uL Final    MPV 11/07/2023 10.4  9.2 - 12.9 fL Final    Immature Granulocytes 11/07/2023 0.4  0.0 - 0.5 % Final    Gran # (ANC) 11/07/2023 6.6  1.8 - 7.7 K/uL Final    Immature Grans (Abs) 11/07/2023 0.04  0.00 - 0.04 K/uL Final    Lymph # 11/07/2023 1.8  1.0 - 4.8 K/uL Final    Mono # 11/07/2023 0.6  0.3 - 1.0 K/uL Final    Eos # 11/07/2023 0.0  0.0 - 0.5 K/uL Final    Baso # 11/07/2023 0.02  0.00 - 0.20 K/uL Final    nRBC 11/07/2023 0  0 /100 WBC Final    Gran % 11/07/2023 72.2  38.0 - 73.0 % Final    Lymph % 11/07/2023 20.0  18.0 - 48.0 % Final    Mono % 11/07/2023 7.0  4.0 - 15.0 % Final    Eosinophil % 11/07/2023 0.2  0.0 - 8.0 % Final    Basophil % 11/07/2023 0.2  0.0 - 1.9 % Final    Differential Method 11/07/2023 Automated   Final   Admission on 11/01/2023, Discharged on 11/01/2023   Component Date Value Ref Range Status    Gonorrhea 03/09/2023 negative   Final    Chlamydia, Amplified DNA 03/09/2023 negative   Final    HIV 1/2 Ag/Ab 03/09/2023 negative   Final    Strep B Culture 09/20/2023 positive   Final   Admission on 10/23/2023, Discharged on 10/23/2023   Component Date Value Ref Range Status    Urine Volume 10/23/2023 3200  mL Final    Urine Collection Duration 10/23/2023 24  Hr Final    Protein, Urine 10/23/2023 7  0 - 15 mg/dL Final    Urine Protein, Timed 10/23/2023 224 (H)  6 - 100 mg/Spec Final   Admission on 10/18/2023, Discharged on 10/19/2023   Component Date Value Ref Range Status    Specimen UA 10/18/2023 Urine, Clean Catch   Final    Color, UA 10/18/2023 Yellow  Yellow, Straw, Danielle Final    Appearance, UA 10/18/2023 Hazy (A)  Clear Final    pH, UA 10/18/2023 7.0  5.0 - 8.0 Final    Specific Gravity, UA 10/18/2023 1.010  1.005 - 1.030 Final     Protein, UA 10/18/2023 Trace (A)  Negative Final    Glucose, UA 10/18/2023 Negative  Negative Final    Ketones, UA 10/18/2023 3+ (A)  Negative Final    Bilirubin (UA) 10/18/2023 Negative  Negative Final    Occult Blood UA 10/18/2023 Negative  Negative Final    Nitrite, UA 10/18/2023 Negative  Negative Final    Urobilinogen, UA 10/18/2023 Negative  Negative EU/dL Final    Leukocytes, UA 10/18/2023 3+ (A)  Negative Final    RBC, UA 10/18/2023 1  0 - 4 /hpf Final    WBC, UA 10/18/2023 17 (H)  0 - 5 /hpf Final    Bacteria 10/18/2023 Negative  None-Occ /hpf Final    Squam Epithel, UA 10/18/2023 24  /hpf Final    Hyaline Casts, UA 10/18/2023 19 (A)  0-1/lpf /lpf Final    Microscopic Comment 10/18/2023 SEE COMMENT   Final    Urine Culture, Routine 10/18/2023 Multiple organisms isolated. None in predominance.  Repeat if   Final    Urine Culture, Routine 10/18/2023 clinically necessary.   Final    WBC 10/18/2023 6.22  3.90 - 12.70 K/uL Final    RBC 10/18/2023 3.71 (L)  4.00 - 5.40 M/uL Final    Hemoglobin 10/18/2023 9.2 (L)  12.0 - 16.0 g/dL Final    Hematocrit 10/18/2023 29.1 (L)  37.0 - 48.5 % Final    MCV 10/18/2023 78 (L)  82 - 98 fL Final    MCH 10/18/2023 24.8 (L)  27.0 - 31.0 pg Final    MCHC 10/18/2023 31.6 (L)  32.0 - 36.0 g/dL Final    RDW 10/18/2023 15.5 (H)  11.5 - 14.5 % Final    Platelets 10/18/2023 161  150 - 450 K/uL Final    MPV 10/18/2023 10.9  9.2 - 12.9 fL Final    Immature Granulocytes 10/18/2023 0.5  0.0 - 0.5 % Final    Gran # (ANC) 10/18/2023 4.6  1.8 - 7.7 K/uL Final    Immature Grans (Abs) 10/18/2023 0.03  0.00 - 0.04 K/uL Final    Lymph # 10/18/2023 1.0  1.0 - 4.8 K/uL Final    Mono # 10/18/2023 0.6  0.3 - 1.0 K/uL Final    Eos # 10/18/2023 0.0  0.0 - 0.5 K/uL Final    Baso # 10/18/2023 0.01  0.00 - 0.20 K/uL Final    nRBC 10/18/2023 0  0 /100 WBC Final    Gran % 10/18/2023 74.2 (H)  38.0 - 73.0 % Final    Lymph % 10/18/2023 15.3 (L)  18.0 - 48.0 % Final    Mono % 10/18/2023 9.3  4.0 - 15.0 % Final     Eosinophil % 10/18/2023 0.5  0.0 - 8.0 % Final    Basophil % 10/18/2023 0.2  0.0 - 1.9 % Final    Differential Method 10/18/2023 Automated   Final    Sodium 10/18/2023 133 (L)  136 - 145 mmol/L Final    Potassium 10/18/2023 3.7  3.5 - 5.1 mmol/L Final    Chloride 10/18/2023 105  95 - 110 mmol/L Final    CO2 10/18/2023 19 (L)  23 - 29 mmol/L Final    Glucose 10/18/2023 71  70 - 110 mg/dL Final    BUN 10/18/2023 5 (L)  6 - 20 mg/dL Final    Creatinine 10/18/2023 0.5  0.5 - 1.4 mg/dL Final    Calcium 10/18/2023 8.7  8.7 - 10.5 mg/dL Final    Total Protein 10/18/2023 6.1  6.0 - 8.4 g/dL Final    Albumin 10/18/2023 3.4 (L)  3.5 - 5.2 g/dL Final    Total Bilirubin 10/18/2023 1.0  0.1 - 1.0 mg/dL Final    Alkaline Phosphatase 10/18/2023 98  55 - 135 U/L Final    AST 10/18/2023 13  10 - 40 U/L Final    ALT 10/18/2023 5 (L)  10 - 44 U/L Final    eGFR 10/18/2023 >60.0  >60 mL/min/1.73 m^2 Final    Anion Gap 10/18/2023 9  8 - 16 mmol/L Final    Specimen UA 10/18/2023 Urine, Clean Catch   Final    Color, UA 10/18/2023 Yellow  Yellow, Straw, Danielle Final    Appearance, UA 10/18/2023 Clear  Clear Final    pH, UA 10/18/2023 7.0  5.0 - 8.0 Final    Specific Gravity, UA 10/18/2023 1.010  1.005 - 1.030 Final    Protein, UA 10/18/2023 Trace (A)  Negative Final    Glucose, UA 10/18/2023 3+ (A)  Negative Final    Ketones, UA 10/18/2023 3+ (A)  Negative Final    Bilirubin (UA) 10/18/2023 Negative  Negative Final    Occult Blood UA 10/18/2023 Negative  Negative Final    Nitrite, UA 10/18/2023 Negative  Negative Final    Urobilinogen, UA 10/18/2023 Negative  Negative EU/dL Final    Leukocytes, UA 10/18/2023 Trace (A)  Negative Final    RBC, UA 10/18/2023 1  0 - 4 /hpf Final    WBC, UA 10/18/2023 3  0 - 5 /hpf Final    Bacteria 10/18/2023 Negative  None-Occ /hpf Final    Yeast, UA 10/18/2023 None  None Final    Squam Epithel, UA 10/18/2023 2  /hpf Final    Hyaline Casts, UA 10/18/2023 3 (A)  0-1/lpf /lpf Final    Microscopic Comment  10/18/2023 SEE COMMENT   Final   Admission on 10/16/2023, Discharged on 10/16/2023   Component Date Value Ref Range Status    WBC 10/16/2023 6.80  3.90 - 12.70 K/uL Final    RBC 10/16/2023 3.36 (L)  4.00 - 5.40 M/uL Final    Hemoglobin 10/16/2023 8.5 (L)  12.0 - 16.0 g/dL Final    Hematocrit 10/16/2023 26.8 (L)  37.0 - 48.5 % Final    MCV 10/16/2023 80 (L)  82 - 98 fL Final    MCH 10/16/2023 25.3 (L)  27.0 - 31.0 pg Final    MCHC 10/16/2023 31.7 (L)  32.0 - 36.0 g/dL Final    RDW 10/16/2023 15.3 (H)  11.5 - 14.5 % Final    Platelets 10/16/2023 181  150 - 450 K/uL Final    MPV 10/16/2023 10.5  9.2 - 12.9 fL Final    Sodium 10/16/2023 135 (L)  136 - 145 mmol/L Final    Potassium 10/16/2023 3.9  3.5 - 5.1 mmol/L Final    Chloride 10/16/2023 107  95 - 110 mmol/L Final    CO2 10/16/2023 22 (L)  23 - 29 mmol/L Final    Glucose 10/16/2023 83  70 - 110 mg/dL Final    BUN 10/16/2023 5 (L)  6 - 20 mg/dL Final    Creatinine 10/16/2023 0.6  0.5 - 1.4 mg/dL Final    Calcium 10/16/2023 8.5 (L)  8.7 - 10.5 mg/dL Final    Total Protein 10/16/2023 6.0  6.0 - 8.4 g/dL Final    Albumin 10/16/2023 3.3 (L)  3.5 - 5.2 g/dL Final    Total Bilirubin 10/16/2023 0.6  0.1 - 1.0 mg/dL Final    Alkaline Phosphatase 10/16/2023 86  55 - 135 U/L Final    AST 10/16/2023 8 (L)  10 - 40 U/L Final    ALT 10/16/2023 5 (L)  10 - 44 U/L Final    eGFR 10/16/2023 >60.0  >60 mL/min/1.73 m^2 Final    Anion Gap 10/16/2023 6 (L)  8 - 16 mmol/L Final   Admission on 09/27/2023, Discharged on 09/27/2023   Component Date Value Ref Range Status    Sodium 09/27/2023 138  136 - 145 mmol/L Final    Potassium 09/27/2023 4.0  3.5 - 5.1 mmol/L Final    Chloride 09/27/2023 108  95 - 110 mmol/L Final    CO2 09/27/2023 20 (L)  23 - 29 mmol/L Final    Glucose 09/27/2023 79  70 - 110 mg/dL Final    BUN 09/27/2023 7  6 - 20 mg/dL Final    Creatinine 09/27/2023 0.5  0.5 - 1.4 mg/dL Final    Calcium 09/27/2023 8.6 (L)  8.7 - 10.5 mg/dL Final    Total Protein 09/27/2023 6.2  6.0  - 8.4 g/dL Final    Albumin 09/27/2023 3.4 (L)  3.5 - 5.2 g/dL Final    Total Bilirubin 09/27/2023 0.5  0.1 - 1.0 mg/dL Final    Alkaline Phosphatase 09/27/2023 75  55 - 135 U/L Final    AST 09/27/2023 8 (L)  10 - 40 U/L Final    ALT 09/27/2023 5 (L)  10 - 44 U/L Final    eGFR 09/27/2023 >60.0  >60 mL/min/1.73 m^2 Final    Anion Gap 09/27/2023 10  8 - 16 mmol/L Final    WBC 09/27/2023 8.48  3.90 - 12.70 K/uL Final    RBC 09/27/2023 3.60 (L)  4.00 - 5.40 M/uL Final    Hemoglobin 09/27/2023 9.1 (L)  12.0 - 16.0 g/dL Final    Hematocrit 09/27/2023 28.3 (L)  37.0 - 48.5 % Final    MCV 09/27/2023 79 (L)  82 - 98 fL Final    MCH 09/27/2023 25.3 (L)  27.0 - 31.0 pg Final    MCHC 09/27/2023 32.2  32.0 - 36.0 g/dL Final    RDW 09/27/2023 15.2 (H)  11.5 - 14.5 % Final    Platelets 09/27/2023 182  150 - 450 K/uL Final    MPV 09/27/2023 10.7  9.2 - 12.9 fL Final    Immature Granulocytes 09/27/2023 0.8 (H)  0.0 - 0.5 % Final    Gran # (ANC) 09/27/2023 6.0  1.8 - 7.7 K/uL Final    Immature Grans (Abs) 09/27/2023 0.07 (H)  0.00 - 0.04 K/uL Final    Lymph # 09/27/2023 1.8  1.0 - 4.8 K/uL Final    Mono # 09/27/2023 0.6  0.3 - 1.0 K/uL Final    Eos # 09/27/2023 0.0  0.0 - 0.5 K/uL Final    Baso # 09/27/2023 0.02  0.00 - 0.20 K/uL Final    nRBC 09/27/2023 0  0 /100 WBC Final    Gran % 09/27/2023 70.7  38.0 - 73.0 % Final    Lymph % 09/27/2023 21.0  18.0 - 48.0 % Final    Mono % 09/27/2023 7.1  4.0 - 15.0 % Final    Eosinophil % 09/27/2023 0.2  0.0 - 8.0 % Final    Basophil % 09/27/2023 0.2  0.0 - 1.9 % Final    Differential Method 09/27/2023 Automated   Final    Indirect Nae 09/27/2023 NEG   Final    RPR 09/27/2023 Non-reactive  Non-reactive Final    ABO 09/27/2023 AB   Final    Rh Type 09/27/2023 POS   Final   Admission on 09/19/2023, Discharged on 09/19/2023   Component Date Value Ref Range Status    Specimen UA 09/19/2023 Urine, Clean Catch   Final    Color, UA 09/19/2023 Yellow  Yellow, Straw, Danielle Final    Appearance, UA  09/19/2023 Clear  Clear Final    pH, UA 09/19/2023 7.0  5.0 - 8.0 Final    Specific Gravity, UA 09/19/2023 1.015  1.005 - 1.030 Final    Protein, UA 09/19/2023 Trace (A)  Negative Final    Glucose, UA 09/19/2023 Negative  Negative Final    Ketones, UA 09/19/2023 Negative  Negative Final    Bilirubin (UA) 09/19/2023 Negative  Negative Final    Occult Blood UA 09/19/2023 Negative  Negative Final    Nitrite, UA 09/19/2023 Negative  Negative Final    Urobilinogen, UA 09/19/2023 Negative  Negative EU/dL Final    Leukocytes, UA 09/19/2023 2+ (A)  Negative Final    RBC, UA 09/19/2023 2  0 - 4 /hpf Final    WBC, UA 09/19/2023 11 (H)  0 - 5 /hpf Final    Bacteria 09/19/2023 Negative  None-Occ /hpf Final    Squam Epithel, UA 09/19/2023 13  /hpf Final    Hyaline Casts, UA 09/19/2023 10 (A)  0-1/lpf /lpf Final    Microscopic Comment 09/19/2023 SEE COMMENT   Final    Urine Culture, Routine 09/19/2023  (A)   Final                    Value:STREPTOCOCCUS AGALACTIAE (GROUP B)  > 100,000 cfu/ml  In case of Penicillin allergy, call lab for further testing.  Beta-hemolytic streptococci are routinely susceptible to   penicillins,cephalosporins and carbapenems.      Antibody Screen 09/03/2023 negative   Final    Rubella Immune Status 09/03/2023 immune   Final    WBC 09/19/2023 7.48  3.90 - 12.70 K/uL Final    RBC 09/19/2023 3.54 (L)  4.00 - 5.40 M/uL Final    Hemoglobin 09/19/2023 9.2 (L)  12.0 - 16.0 g/dL Final    Hematocrit 09/19/2023 28.3 (L)  37.0 - 48.5 % Final    MCV 09/19/2023 80 (L)  82 - 98 fL Final    MCH 09/19/2023 26.0 (L)  27.0 - 31.0 pg Final    MCHC 09/19/2023 32.5  32.0 - 36.0 g/dL Final    RDW 09/19/2023 14.6 (H)  11.5 - 14.5 % Final    Platelets 09/19/2023 156  150 - 450 K/uL Final    MPV 09/19/2023 10.7  9.2 - 12.9 fL Final    Sodium 09/19/2023 136  136 - 145 mmol/L Final    Potassium 09/19/2023 3.9  3.5 - 5.1 mmol/L Final    Chloride 09/19/2023 108  95 - 110 mmol/L Final    CO2 09/19/2023 24  23 - 29 mmol/L Final     Glucose 2023 73  70 - 110 mg/dL Final    BUN 2023 6  6 - 20 mg/dL Final    Creatinine 2023 0.6  0.5 - 1.4 mg/dL Final    Calcium 2023 8.5 (L)  8.7 - 10.5 mg/dL Final    Total Protein 2023 6.4  6.0 - 8.4 g/dL Final    Albumin 2023 3.0 (L)  3.5 - 5.2 g/dL Final    Total Bilirubin 2023 0.8  0.1 - 1.0 mg/dL Final    Alkaline Phosphatase 2023 70  55 - 135 U/L Final    AST 2023 10  10 - 40 U/L Final    ALT 2023 9 (L)  10 - 44 U/L Final    eGFR 2023 >60.0  >60 mL/min/1.73 m^2 Final    Anion Gap 2023 4 (L)  8 - 16 mmol/L Final    N gonorrhoeae, amplified DNA 2023 negative   Final    Chlamydia, Amplified DNA 2023 negative   Final    Hepatitis B Surface Ag 2023 Negative   Final    HIV 1/2 Ag/Ab 2023 negative   Final    RPR 2023 nonreactive   Final   Office Visit on 2023   Component Date Value Ref Range Status    SARS Coronavirus 2 Antigen 2023 Negative  Negative Final     Acceptable 2023 Yes   Final    Rapid Strep A Screen 2023 Negative  Negative Final     Acceptable 2023 Yes   Final         Past Medical History:   Diagnosis Date    Anxiety     Depression     PCOS (polycystic ovarian syndrome)      Past Surgical History:   Procedure Laterality Date    BARIATRIC SURGERY  2018    Gastric sleeve     SECTION N/A 02/10/2022    Procedure:  SECTION  (PRIMARY)   ([facility]);  Surgeon: Becca Eastman MD;  Location: [facility] L&[facility];  Service: OB/GYN;  Laterality: N/A;     SECTION N/A 2023    Procedure:  SECTION;  Surgeon: Becca Eastman MD;  Location: [facility] L[facility];  Service: OB/GYN;  Laterality: N/A;    CHOLECYSTECTOMY  2019    LAPAROSCOPIC APPENDECTOMY N/A 2022    Procedure: APPENDECTOMY, LAPAROSCOPIC;  Surgeon: Sanjay Lopez III, MD;  Location: [facility];  Service: General;  Laterality: N/A;     Family History   Problem Relation Age of  Onset    Hypothyroidism Mother     Heart disease Father 56        ended up having CABG    Depression Brother     Mental illness Brother     Cancer Maternal Aunt 40        breast    Cancer Maternal Grandmother 60        colon       Marital Status:   Alcohol History:  reports that she does not currently use alcohol.  Tobacco History:  reports that she has never smoked. She has never used smokeless tobacco.  Drug History:  reports no history of drug use.    Review of patient's allergies indicates:  No Known Allergies    Current Outpatient Medications:     ibuprofen (ADVIL,MOTRIN) 600 MG tablet, Take 1 tablet (600 mg total) by mouth every 6 (six) hours., Disp: 60 tablet, Rfl: 0    ALPRAZolam (XANAX) 1 MG tablet, One only for acute panics-, Disp: 30 tablet, Rfl: 0    busPIRone (BUSPAR) 10 MG tablet, Take 1 tablet (10 mg total) by mouth 3 (three) times daily., Disp: 90 tablet, Rfl: 0    Review of Systems   Constitutional:  Positive for fatigue (waves). Negative for appetite change, chills, diaphoresis, fever and unexpected weight change.   HENT:  Negative for congestion, ear pain, hearing loss, nosebleeds, postnasal drip, sinus pressure, sinus pain, sneezing, sore throat, trouble swallowing and voice change.    Eyes:  Negative for photophobia, pain, itching and visual disturbance.   Respiratory:  Negative for apnea, cough, chest tightness, shortness of breath, wheezing and stridor.    Cardiovascular:  Positive for palpitations (put to bed for tachycardia). Negative for chest pain and leg swelling.   Gastrointestinal:  Negative for abdominal distention, abdominal pain, blood in stool, constipation, diarrhea, nausea and vomiting.   Endocrine: Negative for cold intolerance, heat intolerance, polydipsia and polyuria.   Genitourinary:  Negative for difficulty urinating, dyspareunia, dysuria, flank pain, frequency, hematuria, menstrual problem, pelvic pain, urgency, vaginal discharge and vaginal pain.   Musculoskeletal:   Negative for arthralgias, back pain, joint swelling, myalgias, neck pain and neck stiffness.   Skin:  Negative for pallor.   Allergic/Immunologic: Negative for environmental allergies and food allergies.   Neurological:  Positive for headaches. Negative for dizziness, tremors, speech difficulty, weakness, light-headedness and numbness.   Hematological:  Does not bruise/bleed easily.   Psychiatric/Behavioral:  Positive for sleep disturbance. Negative for agitation, confusion, decreased concentration and suicidal ideas. The patient is not nervous/anxious.         Some waxing and waning moods-highs and lows          Objective:          11/1/2023     6:38 PM 11/1/2023     7:05 PM 11/7/2023    10:46 AM 11/7/2023    11:00 AM 11/7/2023     2:23 PM 2/20/2024     4:51 PM   Vitals - 1 value per visit   SYSTOLIC    110  110   DIASTOLIC    69  70   Pulse    105  70   Temp  98.6 °F (37 °C)   97.7 °F (36.5 °C) 98.6 °F (37 °C)   Resp 16   18  16   SPO2    99 %  99 %   Weight (lb)   297   282   Weight (kg)   134.718   127.914   Height   6' (1.829 m)   6' (1.829 m)   BMI (Calculated)   40.3   38.2   Pain Score      Zero   (    Physical Exam  Constitutional:       General: She is not in acute distress.     Appearance: She is obese. She is not ill-appearing.   HENT:      Head: Normocephalic and atraumatic.      Nose: Nose normal.      Mouth/Throat:      Mouth: Mucous membranes are moist.   Eyes:      Extraocular Movements: Extraocular movements intact.      Conjunctiva/sclera: Conjunctivae normal.      Pupils: Pupils are equal, round, and reactive to light.   Neck:      Vascular: No carotid bruit.      Comments: Prominent thyroid  Cardiovascular:      Rate and Rhythm: Normal rate and regular rhythm.      Pulses: Normal pulses.      Heart sounds: Normal heart sounds. No murmur heard.  Pulmonary:      Effort: Pulmonary effort is normal. No respiratory distress.      Breath sounds: Normal breath sounds. No stridor. No wheezing or  rhonchi.   Abdominal:      General: Bowel sounds are normal. There is no distension.      Palpations: Abdomen is soft. There is no mass.      Tenderness: There is no abdominal tenderness.      Hernia: No hernia is present.   Musculoskeletal:         General: No swelling, tenderness, deformity or signs of injury.   Lymphadenopathy:      Cervical: No cervical adenopathy.   Skin:     General: Skin is warm and dry.      Coloration: Skin is not jaundiced or pale.      Findings: No bruising or erythema.   Neurological:      General: No focal deficit present.      Mental Status: She is alert and oriented to person, place, and time.   Psychiatric:         Mood and Affect: Mood normal.         Thought Content: Thought content normal.         Judgment: Judgment normal.           Assessment:       1. Severe anxiety with panic    2. Insomnia due to medical condition    3. PCOS (polycystic ovarian syndrome)    4. Fatigue, unspecified type    5. Hx of iron deficiency anemia    6. Thyromegaly         Plan:       Severe anxiety with panic    Insomnia due to medical condition    PCOS (polycystic ovarian syndrome)  -     Lipid Panel; Future; Expected date: 02/20/2024    Fatigue, unspecified type  -     CBC Auto Differential; Future; Expected date: 02/20/2024  -     TSH; Future; Expected date: 02/20/2024  -     Comprehensive Metabolic Panel; Future; Expected date: 02/20/2024  -     Iron and TIBC; Future; Expected date: 02/20/2024    Hx of iron deficiency anemia  -     CBC Auto Differential; Future; Expected date: 02/20/2024  -     Iron and TIBC; Future; Expected date: 02/20/2024  -     FERRITIN; Future; Expected date: 02/20/2024  -     Hemoglobin A1C; Future; Expected date: 02/20/2024    Thyromegaly  -     US Soft Tissue Head Neck; Future; Expected date: 02/20/2024  -     Cancel: T3, FREE; Future; Expected date: 02/20/2024  -     T3, FREE; Future; Expected date: 02/20/2024      Follow up for 3-20-24, FOLLOW UP MEDICATIONS.         2/21/2024 Lyubov Cazares M.D.

## 2024-02-20 ENCOUNTER — OFFICE VISIT (OUTPATIENT)
Dept: FAMILY MEDICINE | Facility: CLINIC | Age: 29
End: 2024-02-20
Payer: COMMERCIAL

## 2024-02-20 VITALS
DIASTOLIC BLOOD PRESSURE: 70 MMHG | BODY MASS INDEX: 38.19 KG/M2 | OXYGEN SATURATION: 99 % | SYSTOLIC BLOOD PRESSURE: 110 MMHG | TEMPERATURE: 99 F | HEIGHT: 72 IN | WEIGHT: 282 LBS | RESPIRATION RATE: 16 BRPM | HEART RATE: 70 BPM

## 2024-02-20 DIAGNOSIS — E01.0 THYROMEGALY: ICD-10-CM

## 2024-02-20 DIAGNOSIS — G47.01 INSOMNIA DUE TO MEDICAL CONDITION: ICD-10-CM

## 2024-02-20 DIAGNOSIS — E28.2 PCOS (POLYCYSTIC OVARIAN SYNDROME): ICD-10-CM

## 2024-02-20 DIAGNOSIS — F41.0 SEVERE ANXIETY WITH PANIC: Primary | ICD-10-CM

## 2024-02-20 DIAGNOSIS — Z86.2 HX OF IRON DEFICIENCY ANEMIA: ICD-10-CM

## 2024-02-20 DIAGNOSIS — R53.83 FATIGUE, UNSPECIFIED TYPE: ICD-10-CM

## 2024-02-20 PROCEDURE — 99204 OFFICE O/P NEW MOD 45 MIN: CPT | Mod: S$GLB,,, | Performed by: INTERNAL MEDICINE

## 2024-02-20 PROCEDURE — 99999 PR PBB SHADOW E&M-EST. PATIENT-LVL IV: CPT | Mod: PBBFAC,,, | Performed by: INTERNAL MEDICINE

## 2024-02-20 PROCEDURE — 3078F DIAST BP <80 MM HG: CPT | Mod: CPTII,S$GLB,, | Performed by: INTERNAL MEDICINE

## 2024-02-20 PROCEDURE — 3074F SYST BP LT 130 MM HG: CPT | Mod: CPTII,S$GLB,, | Performed by: INTERNAL MEDICINE

## 2024-02-20 PROCEDURE — 3008F BODY MASS INDEX DOCD: CPT | Mod: CPTII,S$GLB,, | Performed by: INTERNAL MEDICINE

## 2024-02-20 PROCEDURE — 1159F MED LIST DOCD IN RCRD: CPT | Mod: CPTII,S$GLB,, | Performed by: INTERNAL MEDICINE

## 2024-02-20 RX ORDER — BUSPIRONE HYDROCHLORIDE 10 MG/1
10 TABLET ORAL 3 TIMES DAILY
Qty: 90 TABLET | Refills: 0 | Status: SHIPPED | OUTPATIENT
Start: 2024-02-20 | End: 2025-02-19

## 2024-02-20 RX ORDER — SERTRALINE HYDROCHLORIDE 50 MG/1
50 TABLET, FILM COATED ORAL DAILY
COMMUNITY
Start: 2024-01-02 | End: 2024-02-20

## 2024-02-20 RX ORDER — ALPRAZOLAM 1 MG/1
TABLET ORAL
Qty: 30 TABLET | Refills: 0 | Status: SHIPPED | OUTPATIENT
Start: 2024-02-20 | End: 2024-03-18 | Stop reason: SDUPTHER

## 2024-02-24 ENCOUNTER — HOSPITAL ENCOUNTER (OUTPATIENT)
Dept: RADIOLOGY | Facility: HOSPITAL | Age: 29
Discharge: HOME | End: 2024-02-24
Attending: INTERNAL MEDICINE
Payer: COMMERCIAL

## 2024-02-24 DIAGNOSIS — E01.0 THYROMEGALY: ICD-10-CM

## 2024-02-24 PROCEDURE — 76536 US EXAM OF HEAD AND NECK: CPT | Mod: 26,,, | Performed by: RADIOLOGY

## 2024-02-24 PROCEDURE — 76536 US EXAM OF HEAD AND NECK: CPT | Mod: TC

## 2024-02-25 ENCOUNTER — PATIENT MESSAGE (OUTPATIENT)
Dept: FAMILY MEDICINE | Facility: CLINIC | Age: 29
End: 2024-02-25
Payer: COMMERCIAL

## 2024-02-27 DIAGNOSIS — D50.0 IRON DEFICIENCY ANEMIA DUE TO CHRONIC BLOOD LOSS: Primary | ICD-10-CM

## 2024-02-27 DIAGNOSIS — E04.9 THYROID GOITER: ICD-10-CM

## 2024-02-27 LAB
ALBUMIN SERPL-MCNC: 4.5 G/DL (ref 4–5)
ALBUMIN/GLOB SERPL: 2 {RATIO} (ref 1.2–2.2)
ALP SERPL-CCNC: 83 IU/L (ref 44–121)
ALT SERPL-CCNC: 28 IU/L (ref 0–32)
AST SERPL-CCNC: 15 IU/L (ref 0–40)
BASOPHILS # BLD AUTO: 0 X10E3/UL (ref 0–0.2)
BASOPHILS NFR BLD AUTO: 1 %
BILIRUB SERPL-MCNC: 0.5 MG/DL (ref 0–1.2)
BUN SERPL-MCNC: 8 MG/DL (ref 6–20)
BUN/CREAT SERPL: 10 (ref 9–23)
CALCIUM SERPL-MCNC: 9.7 MG/DL (ref 8.7–10.2)
CHLORIDE SERPL-SCNC: 105 MMOL/L (ref 96–106)
CHOLEST SERPL-MCNC: 174 MG/DL (ref 100–199)
CO2 SERPL-SCNC: 22 MMOL/L (ref 20–29)
CREAT SERPL-MCNC: 0.84 MG/DL (ref 0.57–1)
EOSINOPHIL # BLD AUTO: 0.1 X10E3/UL (ref 0–0.4)
EOSINOPHIL NFR BLD AUTO: 1 %
ERYTHROCYTE [DISTWIDTH] IN BLOOD BY AUTOMATED COUNT: 14.8 % (ref 11.7–15.4)
EST. GFR  (NO RACE VARIABLE): 98 ML/MIN/1.73
FERRITIN SERPL-MCNC: 5 NG/ML (ref 15–150)
GLOBULIN SER CALC-MCNC: 2.3 G/DL (ref 1.5–4.5)
GLUCOSE SERPL-MCNC: 80 MG/DL (ref 70–99)
HBA1C MFR BLD: 5.6 % (ref 4.8–5.6)
HCT VFR BLD AUTO: 33.8 % (ref 34–46.6)
HDLC SERPL-MCNC: 59 MG/DL
HGB BLD-MCNC: 10.2 G/DL (ref 11.1–15.9)
IMM GRANULOCYTES # BLD AUTO: 0 X10E3/UL (ref 0–0.1)
IMM GRANULOCYTES NFR BLD AUTO: 0 %
IRON SATN MFR SERPL: 3 % (ref 15–55)
IRON SERPL-MCNC: 15 UG/DL (ref 27–159)
LDLC SERPL CALC-MCNC: 96 MG/DL (ref 0–99)
LYMPHOCYTES # BLD AUTO: 1.2 X10E3/UL (ref 0.7–3.1)
LYMPHOCYTES NFR BLD AUTO: 30 %
MCH RBC QN AUTO: 22.8 PG (ref 26.6–33)
MCHC RBC AUTO-ENTMCNC: 30.2 G/DL (ref 31.5–35.7)
MCV RBC AUTO: 75 FL (ref 79–97)
MONOCYTES # BLD AUTO: 0.3 X10E3/UL (ref 0.1–0.9)
MONOCYTES NFR BLD AUTO: 8 %
NEUTROPHILS # BLD AUTO: 2.5 X10E3/UL (ref 1.4–7)
NEUTROPHILS NFR BLD AUTO: 60 %
PLATELET # BLD AUTO: 270 X10E3/UL (ref 150–450)
POTASSIUM SERPL-SCNC: 4.6 MMOL/L (ref 3.5–5.2)
PROT SERPL-MCNC: 6.8 G/DL (ref 6–8.5)
RBC # BLD AUTO: 4.48 X10E6/UL (ref 3.77–5.28)
SODIUM SERPL-SCNC: 139 MMOL/L (ref 134–144)
T3FREE SERPL-MCNC: 3.3 PG/ML (ref 2–4.4)
TIBC SERPL-MCNC: 461 UG/DL (ref 250–450)
TRIGL SERPL-MCNC: 103 MG/DL (ref 0–149)
TSH SERPL DL<=0.005 MIU/L-ACNC: 1.07 UIU/ML (ref 0.45–4.5)
UIBC SERPL-MCNC: 446 UG/DL (ref 131–425)
VLDLC SERPL CALC-MCNC: 19 MG/DL (ref 5–40)
WBC # BLD AUTO: 4.2 X10E3/UL (ref 3.4–10.8)

## 2024-02-27 RX ORDER — FERROUS SULFATE 325(65) MG
TABLET ORAL
Qty: 180 TABLET | Refills: 1 | Status: SHIPPED | OUTPATIENT
Start: 2024-02-27

## 2024-02-27 RX ORDER — LEVOTHYROXINE SODIUM 100 UG/1
100 TABLET ORAL
Qty: 30 TABLET | Refills: 11 | Status: SHIPPED | OUTPATIENT
Start: 2024-02-27 | End: 2025-02-26

## 2024-03-18 DIAGNOSIS — F41.0 SEVERE ANXIETY WITH PANIC: ICD-10-CM

## 2024-03-18 RX ORDER — ALPRAZOLAM 1 MG/1
TABLET ORAL
Qty: 30 TABLET | Refills: 0 | Status: SHIPPED | OUTPATIENT
Start: 2024-03-18

## 2024-03-22 ENCOUNTER — E-VISIT (OUTPATIENT)
Dept: FAMILY MEDICINE | Facility: CLINIC | Age: 29
End: 2024-03-22
Payer: COMMERCIAL

## 2024-03-22 DIAGNOSIS — J01.00 SUBACUTE MAXILLARY SINUSITIS: Primary | ICD-10-CM

## 2024-03-22 DIAGNOSIS — R05.2 SUBACUTE COUGH: ICD-10-CM

## 2024-03-22 PROCEDURE — 99422 OL DIG E/M SVC 11-20 MIN: CPT | Mod: ,,, | Performed by: STUDENT IN AN ORGANIZED HEALTH CARE EDUCATION/TRAINING PROGRAM

## 2024-03-22 RX ORDER — PROMETHAZINE HYDROCHLORIDE AND DEXTROMETHORPHAN HYDROBROMIDE 6.25; 15 MG/5ML; MG/5ML
5 SYRUP ORAL EVERY 6 HOURS PRN
Qty: 118 ML | Refills: 1 | Status: SHIPPED | OUTPATIENT
Start: 2024-03-22 | End: 2024-04-01

## 2024-03-22 RX ORDER — OXYMETAZOLINE HCL 0.05 %
2 SPRAY, NON-AEROSOL (ML) NASAL 2 TIMES DAILY
Qty: 6 ML | Refills: 0 | Status: SHIPPED | OUTPATIENT
Start: 2024-03-22 | End: 2024-03-25

## 2024-03-22 RX ORDER — DOXYCYCLINE HYCLATE 100 MG
100 TABLET ORAL 2 TIMES DAILY
Qty: 14 TABLET | Refills: 0 | Status: SHIPPED | OUTPATIENT
Start: 2024-03-22 | End: 2024-03-29

## 2024-03-22 NOTE — PROGRESS NOTES
Patient ID: Rina Hearn is a 28 y.o. female.    Chief Complaint: URI (Entered automatically based on patient selection in Patient Portal.)          274}  The patient initiated a request through [employer] on 3/22/2024 for evaluation and management with a chief complaint of URI (Entered automatically based on patient selection in Patient Portal.)     I evaluated the questionnaire submission on 03/22/2024 .    Total Time (in minutes): 11     [facility] Peq Evisit Upper Respitatory/Cough Questionnaire    3/22/2024  2:09 PM CDT - Filed by Patient   Do you agree to participate in an E-Visit? Yes   If you have any of the following symptoms, please present to your local ER or call 911:  I acknowledge   What is the main issue that you would like for your doctor to address today? Coughing, green mucus, sinus pressure, scratchy throat   Are you able to take your vital signs? No   Are you pregnant, could you be pregnant, or are you breast feeding? None of the above   What symptoms do you currently have?  Cough;  Fatigue;  Nasal Congestion;  Runny nose;  Sore throat   Describe your cough: Productive (containing mucus)   Describe the mucus: Green;  Thick   Have you had any of the following? None of the above   Have you ever smoked? I have never smoked   Have you had a fever? No   When did your symptoms first appear? 3/19/2024   In the last two weeks, have you been in close contact with someone who has COVID-19 or the Flu? No   In the last two weeks, have you worked or volunteered in a healthcare facility or as a ? Healthcare facilities include a hospital, medical or dental clinic, long-term care facility, or nursing home No   Do you live in a long-term care facility, nursing home, group home, or homeless shelter? No   List what you have done or taken to help your symptoms. Dayquil and Nyquil, mucinex   How severe are your symptoms? Moderate   Have your symptoms improved since they first appeared? No change   Have you  taken an at home Covid test? No   Have you taken a Flu test? No   Have you been fully vaccinated for COVID? (2 Pfizer, 2 Moderna or 1 Cholo & Cholo vaccine injections) Yes   Have you received a booster? Yes   Have you recieved a Flu shot? No   Do you have transportation to get tested for COVID if it is indicated and ordered for you at an [facility] location? Yes   Provide any information you feel is important to your history not asked above    Please attach any relevant images or files           Active Problem List with Overview Notes    Diagnosis Date Noted    Delivery by elective  section 2023    Acute appendicitis with localized peritonitis, without perforation, abscess, or gangrene 2022    Right ovarian cyst 2022      Recent Labs Obtained:  Lab Results   Component Value Date    WBC 4.2 2024    HGB 10.2 (L) 2024    HCT 33.8 (L) 2024    MCV 75 (L) 2024     2024     2024    K 4.6 2024    GLU 80 2024    CREATININE 0.84 2024    EGFRNORACEVR 98 2024    HGBA1C 5.6 2024      Review of patient's allergies indicates:  No Known Allergies    Encounter Diagnoses   Name Primary?    Subacute maxillary sinusitis Yes    Subacute cough         No orders of the defined types were placed in this encounter.     Medications Ordered This Encounter   Medications    doxycycline (VIBRA-TABS) 100 MG tablet     Sig: Take 1 tablet (100 mg total) by mouth 2 (two) times daily. for 7 days     Dispense:  14 tablet     Refill:  0    oxymetazoline (AFRIN, OXYMETAZOLINE,) 0.05 % nasal spray     Si sprays by Nasal route 2 (two) times daily. Do not take over 3 days due to rebound congestion for 3 days     Dispense:  6 mL     Refill:  0    promethazine-dextromethorphan (PROMETHAZINE-DM) 6.25-15 mg/5 mL Syrp     Sig: Take 5 mLs by mouth every 6 (six) hours as needed (cough).     Dispense:  118 mL     Refill:  1        E-Visit Time  Tracking:    Day 1 Time (in minutes): 11    Total Time (in minutes): 11      274}

## 2024-04-20 ENCOUNTER — HOSPITAL ENCOUNTER (EMERGENCY)
Facility: HOSPITAL | Age: 29
Discharge: HOME | End: 2024-04-21
Attending: EMERGENCY MEDICINE
Payer: COMMERCIAL

## 2024-04-20 DIAGNOSIS — Q89.9 DEFORMITY: ICD-10-CM

## 2024-04-20 DIAGNOSIS — S53.105A DISLOCATION OF LEFT ELBOW, INITIAL ENCOUNTER: Primary | ICD-10-CM
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
## 2024-04-20 DIAGNOSIS — M25.529 ELBOW PAIN: ICD-10-CM

## 2024-04-20 LAB — HCG SERPL QL: NEGATIVE

## 2024-04-20 PROCEDURE — 99285 EMERGENCY DEPT VISIT HI MDM: CPT | Mod: 25

## 2024-04-20 PROCEDURE — 96361 HYDRATE IV INFUSION ADD-ON: CPT

## 2024-04-20 PROCEDURE — 84703 CHORIONIC GONADOTROPIN ASSAY: CPT | Performed by: EMERGENCY MEDICINE

## 2024-04-20 PROCEDURE — 96376 TX/PRO/DX INJ SAME DRUG ADON: CPT

## 2024-04-20 PROCEDURE — 96374 THER/PROPH/DIAG INJ IV PUSH: CPT

## 2024-04-20 PROCEDURE — 24600 TX CLSD ELBOW DISLC W/O ANES: CPT | Mod: LT

## 2024-04-20 PROCEDURE — 99152 MOD SED SAME PHYS/QHP 5/>YRS: CPT

## 2024-04-20 PROCEDURE — 99153 MOD SED SAME PHYS/QHP EA: CPT

## 2024-04-20 PROCEDURE — 96375 TX/PRO/DX INJ NEW DRUG ADDON: CPT

## 2024-04-20 PROCEDURE — 25000003 PHARM REV CODE 250: Performed by: EMERGENCY MEDICINE

## 2024-04-20 PROCEDURE — 63600175 PHARM REV CODE 636 W HCPCS: Performed by: EMERGENCY MEDICINE

## 2024-04-20 RX ORDER — PROPOFOL 10 MG/ML
INJECTION, EMULSION INTRAVENOUS
Status: DISCONTINUED
Start: 2024-04-20 | End: 2024-04-21 | Stop reason: WASHOUT

## 2024-04-20 RX ORDER — FENTANYL CITRATE 50 UG/ML
75 INJECTION, SOLUTION INTRAMUSCULAR; INTRAVENOUS
Status: COMPLETED | OUTPATIENT
Start: 2024-04-20 | End: 2024-04-20

## 2024-04-20 RX ORDER — HYDROMORPHONE HYDROCHLORIDE 1 MG/ML
1 INJECTION, SOLUTION INTRAMUSCULAR; INTRAVENOUS; SUBCUTANEOUS
Status: COMPLETED | OUTPATIENT
Start: 2024-04-20 | End: 2024-04-20

## 2024-04-20 RX ORDER — FENTANYL CITRATE 50 UG/ML
INJECTION, SOLUTION INTRAMUSCULAR; INTRAVENOUS
Status: COMPLETED
Start: 2024-04-20 | End: 2024-04-21

## 2024-04-20 RX ORDER — FENTANYL CITRATE 50 UG/ML
INJECTION, SOLUTION INTRAMUSCULAR; INTRAVENOUS CODE/TRAUMA/SEDATION MEDICATION
Status: COMPLETED | OUTPATIENT
Start: 2024-04-20 | End: 2024-04-20

## 2024-04-20 RX ORDER — FENTANYL CITRATE 50 UG/ML
50 INJECTION, SOLUTION INTRAMUSCULAR; INTRAVENOUS
Status: COMPLETED | OUTPATIENT
Start: 2024-04-20 | End: 2024-04-20

## 2024-04-20 RX ORDER — MIDAZOLAM HYDROCHLORIDE 2 MG/2ML
2.5 INJECTION, SOLUTION INTRAMUSCULAR; INTRAVENOUS
Status: COMPLETED | OUTPATIENT
Start: 2024-04-20 | End: 2024-04-20

## 2024-04-20 RX ORDER — SODIUM CHLORIDE 9 MG/ML
1000 INJECTION, SOLUTION INTRAVENOUS
Status: COMPLETED | OUTPATIENT
Start: 2024-04-20 | End: 2024-04-21

## 2024-04-20 RX ORDER — ONDANSETRON HYDROCHLORIDE 2 MG/ML
4 INJECTION, SOLUTION INTRAVENOUS
Status: COMPLETED | OUTPATIENT
Start: 2024-04-20 | End: 2024-04-20

## 2024-04-20 RX ADMIN — ONDANSETRON 4 MG: 2 INJECTION INTRAMUSCULAR; INTRAVENOUS at 10:04

## 2024-04-20 RX ADMIN — FENTANYL CITRATE 75 MCG: 50 INJECTION, SOLUTION INTRAMUSCULAR; INTRAVENOUS at 11:04

## 2024-04-20 RX ADMIN — HYDROMORPHONE HYDROCHLORIDE 1 MG: 0.5 INJECTION, SOLUTION INTRAMUSCULAR; INTRAVENOUS; SUBCUTANEOUS at 10:04

## 2024-04-20 RX ADMIN — FENTANYL CITRATE 50 MCG: 50 INJECTION, SOLUTION INTRAMUSCULAR; INTRAVENOUS at 11:04

## 2024-04-20 RX ADMIN — SODIUM CHLORIDE 1000 ML: 0.9 INJECTION, SOLUTION INTRAVENOUS at 10:04

## 2024-04-20 RX ADMIN — MIDAZOLAM HYDROCHLORIDE 2.5 MG: 1 INJECTION, SOLUTION INTRAMUSCULAR; INTRAVENOUS at 11:04

## 2024-04-20 RX ADMIN — FENTANYL CITRATE 75 MCG: 50 INJECTION, SOLUTION INTRAMUSCULAR; INTRAVENOUS at 10:04

## 2024-04-21 VITALS
SYSTOLIC BLOOD PRESSURE: 126 MMHG | WEIGHT: 280 LBS | OXYGEN SATURATION: 98 % | RESPIRATION RATE: 17 BRPM | HEIGHT: 72 IN | TEMPERATURE: 98 F | DIASTOLIC BLOOD PRESSURE: 55 MMHG | HEART RATE: 68 BPM | BODY MASS INDEX: 37.93 KG/M2

## 2024-04-21 PROCEDURE — 96361 HYDRATE IV INFUSION ADD-ON: CPT | Mod: 59

## 2024-04-21 PROCEDURE — 25000003 PHARM REV CODE 250: Performed by: EMERGENCY MEDICINE

## 2024-04-21 PROCEDURE — 63600175 PHARM REV CODE 636 W HCPCS

## 2024-04-21 RX ORDER — HYDROCODONE BITARTRATE AND ACETAMINOPHEN 10; 325 MG/1; MG/1
1 TABLET ORAL EVERY 6 HOURS PRN
Qty: 12 TABLET | Refills: 0 | Status: SHIPPED | OUTPATIENT
Start: 2024-04-21 | End: 2024-04-24

## 2024-04-21 RX ORDER — FENTANYL CITRATE 50 UG/ML
50 INJECTION, SOLUTION INTRAMUSCULAR; INTRAVENOUS
Status: COMPLETED | OUTPATIENT
Start: 2024-04-21 | End: 2024-04-21

## 2024-04-21 RX ORDER — HYDROCODONE BITARTRATE AND ACETAMINOPHEN 10; 325 MG/1; MG/1
1 TABLET ORAL
Status: COMPLETED | OUTPATIENT
Start: 2024-04-21 | End: 2024-04-21

## 2024-04-21 RX ADMIN — HYDROCODONE BITARTRATE AND ACETAMINOPHEN 1 TABLET: 10; 325 TABLET ORAL at 02:04

## 2024-04-21 RX ADMIN — FENTANYL CITRATE 50 MCG: 50 INJECTION, SOLUTION INTRAMUSCULAR; INTRAVENOUS at 12:04

## 2024-04-21 NOTE — ED PROVIDER NOTES
Encounter Date: 2024       History     Chief Complaint   Patient presents with    Fall     Slip and fall out of a window x10 mins landed on L arm. +deformity      Chief complaint is fall on left elbow.  Patient was trying to get out of a window and her weight what forward onto her elbow.  On arrival here she was in exquisite severe pain with elbow swelling.  Elbow appeared to be in place.  Good radial pulse good flexion-extension of the wrist good capillary refill to the fingers.        Review of patient's allergies indicates:  No Known Allergies  Past Medical History:   Diagnosis Date    Anxiety     Depression     PCOS (polycystic ovarian syndrome)      Past Surgical History:   Procedure Laterality Date    BARIATRIC SURGERY  2018    Gastric sleeve     SECTION N/A 02/10/2022    Procedure:  SECTION  (PRIMARY)   (BREECH);  Surgeon: Becca Eastman MD;  Location: [facility] L&D;  Service: OB/GYN;  Laterality: N/A;     SECTION N/A 2023    Procedure:  SECTION;  Surgeon: Becca Eastman MD;  Location: [facility] L&D;  Service: OB/GYN;  Laterality: N/A;    CHOLECYSTECTOMY  2019    LAPAROSCOPIC APPENDECTOMY N/A 2022    Procedure: APPENDECTOMY, LAPAROSCOPIC;  Surgeon: Sanjay Lopez III, MD;  Location: [facility] OR;  Service: General;  Laterality: N/A;     Family History   Problem Relation Name Age of Onset    Hypothyroidism Mother      Heart disease Father  56        ended up having CABG    Depression Brother      Mental illness Brother      Cancer Maternal Aunt  40        breast    Cancer Maternal Grandmother  60        colon     Social History     Tobacco Use    Smoking status: Never    Smokeless tobacco: Never   Substance Use Topics    Alcohol use: Not Currently    Drug use: Never     Review of Systems   Constitutional:  Negative for chills and fever.   HENT:  Negative for ear pain, rhinorrhea and sore throat.    Eyes:  Negative for pain and visual disturbance.   Respiratory:   Negative for cough and shortness of breath.    Cardiovascular:  Negative for chest pain and palpitations.   Gastrointestinal:  Negative for abdominal pain, constipation, diarrhea, nausea and vomiting.   Genitourinary:  Negative for dysuria, frequency, hematuria and urgency.   Musculoskeletal:  Negative for back pain, joint swelling and myalgias.        Left elbow pain   Skin:  Negative for rash.   Neurological:  Negative for dizziness, seizures, weakness and headaches.   Psychiatric/Behavioral:  Negative for dysphoric mood. The patient is not nervous/anxious.        Physical Exam     Initial Vitals [04/20/24 2149]   BP Pulse Resp Temp SpO2   (!) 108/92 73 (!) 24 97.6 °F (36.4 °C) 100 %      MAP       --         Physical Exam    Nursing note and vitals reviewed.  Constitutional: She appears well-developed and well-nourished.   HENT:   Head: Normocephalic and atraumatic.   Eyes: Conjunctivae, EOM and lids are normal. Pupils are equal, round, and reactive to light.   Neck: Trachea normal. Neck supple. No thyroid mass and no thyromegaly present.   Normal range of motion.  Cardiovascular:  Normal rate, regular rhythm and normal heart sounds.           Pulmonary/Chest: Effort normal and breath sounds normal.   Abdominal: Abdomen is soft. There is no abdominal tenderness.   Musculoskeletal:         General: Normal range of motion.      Cervical back: Normal range of motion and neck supple.      Comments: Initially patient has swollen left elbow with good position of landmarks medial and lateral condyles and olecranon.  Good radial pulse decreased range of motion secondary to pain sensation intact good flexion-extension of the wrist good capillary refill to the fingers.  No bruising noted     Neurological: She is alert and oriented to person, place, and time. She has normal strength and normal reflexes. No cranial nerve deficit or sensory deficit.   Skin: Skin is warm and dry.   Psychiatric: She has a normal mood and  affect. Her speech is normal and behavior is normal. Judgment and thought content normal.         ED Course   Orthopedic Injury    Date/Time: 4/21/2024 3:40 AM    Performed by: Jono Alba MD  Authorized by: Jono Alba MD    Location procedure was performed:  [facility] EMERGENCY DEPARTMENT          Post-procedure assessment:      Patient given meds including fentanyl and Versed.  and the left elbow dislocation was relocated without difficulty.  Postreduction x-rays negative post reduction splinting and CT scan reveals good location as well.  Patient be discharged home.    Labs Reviewed   PREGNANCY TEST SCREENING, SERUM          Imaging Results              CT Arm Elbow Without Contrast Left (Final result)  Result time 04/21/24 00:40:52      Final result by Armando Morris MD (04/21/24 00:40:52)                   Impression:      Interval reduction of previously demonstrated left elbow dislocation.    Detailed assessment of bony anatomy is limited by streak artifact from body habitus and patient positioning.  Questionable slight cortical irregularity is noted of the lateral aspect of the radial head and a nondisplaced fracture would be difficult to exclude on the basis of this study.  Otherwise, no grossly displaced fracture of the left elbow appreciated.      Electronically signed by: Armando Morris MD  Date:    04/21/2024  Time:    00:40               Narrative:    EXAMINATION:  CT ARM ELBOW WITHOUT CONTRAST LEFT    CLINICAL HISTORY:  pain;    TECHNIQUE:  2 mm images were acquired of the left elbow without IV contrast.  Sagittal and coronal reformatted images were reviewed.    COMPARISON:  Multiple left elbow radiograph series 04/20/2024    FINDINGS:  Image quality is degraded by artifact from patient body habitus and positioning.    There has been interval reduction of previously demonstrated left elbow dislocation.  Alignment appears within normal limits.  Slight cortical irregularity is noted of  the lateral aspect of the radial head and a nondisplaced fracture be difficult to exclude (for example series 7, image 46).  Otherwise, no displaced fracture of the visualized left elbow appreciated allowing for exam limitations.    There is moderate soft tissue edema predominately within the antecubital fossa region.  No soft tissue gas or retained radiopaque foreign body identified.                                       X-Ray Elbow Complete Left (Final result)  Result time 04/21/24 00:11:59      Final result by Armando Morris MD (04/21/24 00:11:59)                   Impression:      As above.      Electronically signed by: Armando Morris MD  Date:    04/21/2024  Time:    00:11               Narrative:    EXAMINATION:  XR ELBOW COMPLETE 3 VIEW LEFT    CLINICAL HISTORY:  Pain in unspecified elbow    TECHNIQUE:  AP, lateral, and oblique views of the left elbow were performed.    COMPARISON:  Multiple radiograph series 04/20/2024    FINDINGS:  There has been interval reduction of previously demonstrated left elbow dislocation.  There is mild widening of the radiocapitellar space noted which may relate to underlying component of ligamentous instability/laxity.  Orthopedic follow-up is advised.  No definite displaced fracture appreciated radiographically.  Soft tissue edema noted about the left elbow.                                        X-Ray Elbow 2 Views Left (Final result)  Result time 04/20/24 23:46:21   Procedure changed from X-Ray Elbow Complete Left     Final result by Armando Morris MD (04/20/24 23:46:21)                   Impression:      Acute dislocation of the left elbow with posterior translation of the proximal radius and ulna relative to the distal humerus.  No grossly displaced fracture appreciated, noting assessment is limited by single lateral view.    This report was flagged in [employer] as abnormal.      Electronically signed by: Armando Morris MD  Date:    04/20/2024  Time:    23:46                Narrative:    EXAMINATION:  XR ELBOW 2 VIEWS LEFT    CLINICAL HISTORY:  trauma;  Pain in unspecified elbow    TECHNIQUE:  Single lateral view of the left elbow.    COMPARISON:  04/20/2024    FINDINGS:  There is an acute dislocation of the left elbow now present.  The proximal radius and ulna/olecranon are posteriorly translated relative to the distal humerus.  No grossly displaced fracture appreciated, noting assessment is limited by single view.  Soft tissue edema noted within the region the antecubital fossa.  No retained radiopaque foreign body identified.                                       X-Ray Elbow Complete Left (Final result)  Result time 04/20/24 22:55:39      Final result by Armando Morris MD (04/20/24 22:55:39)                   Impression:      No definite radiographic evidence of acute displaced fracture or dislocation of the left elbow.      Electronically signed by: Armando Morris MD  Date:    04/20/2024  Time:    22:55               Narrative:    EXAMINATION:  XR ELBOW COMPLETE 3 VIEW LEFT    CLINICAL HISTORY:  Congenital malformation, unspecified    TECHNIQUE:  AP, lateral, and oblique views of the left elbow were performed.    COMPARISON:  None    FINDINGS:  No definite radiographic evidence of acute displaced fracture of the left elbow.  Alignment appears within normal limits without evidence of dislocation.  No significant joint effusion appreciated radiographically.                                       X-Ray Forearm Left (Final result)  Result time 04/20/24 23:18:30      Final result by Armando Morris MD (04/20/24 23:18:30)                   Impression:      No convincing radiographic evidence of acute displaced fracture or dislocation of the left forearm.      Electronically signed by: Armando Morris MD  Date:    04/20/2024  Time:    23:18               Narrative:    EXAMINATION:  XR FOREARM LEFT    CLINICAL HISTORY:  Unspecified fall, initial encounter    TECHNIQUE:  AP  and lateral views of the left forearm were performed.    COMPARISON:  None    FINDINGS:  No radiographic evidence of acute displaced fracture of the left forearm.  Alignment appears within normal limits.  Soft tissues are unremarkable.                                       X-Ray Humerus 2 View Left (Final result)  Result time 04/20/24 22:54:33      Final result by Armando Morris MD (04/20/24 22:54:33)                   Impression:      No radiographic evidence of acute displaced fracture or dislocation of the left humerus.      Electronically signed by: Armando Morris MD  Date:    04/20/2024  Time:    22:54               Narrative:    EXAMINATION:  XR HUMERUS 2 VIEW LEFT    CLINICAL HISTORY:  Congenital malformation, unspecified    TECHNIQUE:  Two views of the left humerus.    COMPARISON:  None    FINDINGS:  No radiographic evidence of acute displaced fracture of the left humerus.  Glenohumeral alignment appears within normal limits without evidence of dislocation.  Soft tissues are unremarkable.                                       Medications   HYDROmorphone injection 1 mg (1 mg Intravenous Given 4/20/24 2207)   ondansetron injection 4 mg (4 mg Intravenous Given 4/20/24 2207)   0.9%  NaCl infusion (0 mLs Intravenous Stopped 4/21/24 0206)   HYDROmorphone injection 1 mg (1 mg Intravenous Given 4/20/24 2219)   fentaNYL 50 mcg/mL injection 75 mcg (75 mcg Intravenous Given 4/20/24 2235)   fentaNYL 50 mcg/mL injection 75 mcg (75 mcg Intravenous Given 4/20/24 2304)   midazolam (PF) (VERSED) 1 mg/mL injection 2.5 mg (2.5 mg Intravenous Given 4/20/24 2335)   fentaNYL 50 mcg/mL injection 50 mcg (50 mcg Intravenous Given 4/20/24 2334)   fentaNYL 50 mcg/mL injection (50 mcg Intravenous Given 4/20/24 2349)   fentaNYL 50 mcg/mL injection 50 mcg (50 mcg Intravenous Given 4/21/24 0015)   HYDROcodone-acetaminophen  mg per tablet 1 tablet (1 tablet Oral Given 4/21/24 0215)     Medical Decision Making  The patient has  left elbow pain differential diagnosis includes dislocation versus fracture.  In this case initial x-ray showed no dislocation but the pain increased and on re-evaluation the elbow was now apparently clinically dislocated.  X-rays repeated verify that and the patient was given pain meds and Versed and the elbow was reduced without difficulty and a splint was placed.  X-rays and CT done after that shows relocation of the dislocated elbow.  This is a questionable cortical irregularity of the lateral aspect of the radial head.  I did speak with our orthopedist recommends splinting after reduction which we did he recommends pain meds neuro checks and she can follow-up in the office.Jono Alba MD  2:05 AM 04/21/2024          Amount and/or Complexity of Data Reviewed  Radiology: ordered.    Risk  Prescription drug management.                                      Clinical Impression:  Final diagnoses:  [Q89.9] Deformity  [W19.XXXA] Fall  [M25.529] Elbow pain  [S53.105A] Dislocation of left elbow, initial encounter (Primary)          ED Disposition Condition    Discharge Stable          ED Prescriptions       Medication Sig Dispense Start Date End Date Auth. Provider    HYDROcodone-acetaminophen (NORCO)  mg per tablet Take 1 tablet by mouth every 6 (six) hours as needed. 12 tablet 4/21/2024 4/24/2024 Jono Alba MD          Follow-up Information    None          Jono Alba MD  04/21/24 0308

## 2024-04-22 ENCOUNTER — TELEPHONE (OUTPATIENT)
Dept: ORTHOPEDICS | Facility: CLINIC | Age: 29
End: 2024-04-22
Payer: COMMERCIAL

## 2024-04-22 NOTE — TELEPHONE ENCOUNTER
----- Message from Og Ansari sent at 4/22/2024  8:07 AM CDT -----  Patient would like to be seen ASAP--    ER Follow Up-- Injured left arm-elbow    [phone]

## 2024-07-23 ENCOUNTER — HOSPITAL ENCOUNTER (EMERGENCY)
Facility: HOSPITAL | Age: 29
Discharge: HOME | End: 2024-07-23
Attending: EMERGENCY MEDICINE
Payer: COMMERCIAL

## 2024-07-23 VITALS
BODY MASS INDEX: 35.89 KG/M2 | RESPIRATION RATE: 20 BRPM | WEIGHT: 265 LBS | HEIGHT: 72 IN | TEMPERATURE: 98 F | SYSTOLIC BLOOD PRESSURE: 124 MMHG | OXYGEN SATURATION: 100 % | HEART RATE: 71 BPM | DIASTOLIC BLOOD PRESSURE: 79 MMHG

## 2024-07-23 DIAGNOSIS — L02.91 ABSCESS: ICD-10-CM

## 2024-07-23 DIAGNOSIS — N75.0 BARTHOLIN CYST: Primary | ICD-10-CM

## 2024-07-23 PROCEDURE — 99283 EMERGENCY DEPT VISIT LOW MDM: CPT | Mod: 25

## 2024-07-23 PROCEDURE — 10060 I&D ABSCESS SIMPLE/SINGLE: CPT

## 2024-07-23 PROCEDURE — 56420 I&D BARTHOLINS GLAND ABSCESS: CPT

## 2024-07-23 PROCEDURE — 25000003 PHARM REV CODE 250: Performed by: STUDENT IN AN ORGANIZED HEALTH CARE EDUCATION/TRAINING PROGRAM

## 2024-07-23 RX ORDER — SULFAMETHOXAZOLE AND TRIMETHOPRIM 800; 160 MG/1; MG/1
1 TABLET ORAL ONCE
Status: DISCONTINUED | OUTPATIENT
Start: 2024-07-23 | End: 2024-07-23

## 2024-07-23 RX ORDER — DOXYCYCLINE 100 MG/1
100 CAPSULE ORAL 2 TIMES DAILY
Qty: 14 CAPSULE | Refills: 0 | Status: SHIPPED | OUTPATIENT
Start: 2024-07-23 | End: 2024-07-30

## 2024-07-23 RX ORDER — ONDANSETRON 4 MG/1
4 TABLET, ORALLY DISINTEGRATING ORAL
Status: COMPLETED | OUTPATIENT
Start: 2024-07-23 | End: 2024-07-23

## 2024-07-23 RX ORDER — DOXYCYCLINE 100 MG/1
100 CAPSULE ORAL ONCE
Status: COMPLETED | OUTPATIENT
Start: 2024-07-23 | End: 2024-07-23

## 2024-07-23 RX ORDER — HYDROCODONE BITARTRATE AND ACETAMINOPHEN 7.5; 325 MG/1; MG/1
1 TABLET ORAL EVERY 6 HOURS PRN
Status: DISCONTINUED | OUTPATIENT
Start: 2024-07-23 | End: 2024-07-23

## 2024-07-23 RX ORDER — LIDOCAINE HYDROCHLORIDE 10 MG/ML
5 INJECTION, SOLUTION EPIDURAL; INFILTRATION; INTRACAUDAL; PERINEURAL
Status: COMPLETED | OUTPATIENT
Start: 2024-07-23 | End: 2024-07-23

## 2024-07-23 RX ADMIN — DOXYCYCLINE HYCLATE 100 MG: 100 CAPSULE ORAL at 06:07

## 2024-07-23 RX ADMIN — LIDOCAINE HYDROCHLORIDE 50 MG: 10 INJECTION, SOLUTION EPIDURAL; INFILTRATION; INTRACAUDAL; PERINEURAL at 04:07

## 2024-07-23 RX ADMIN — HYDROCODONE BITARTRATE AND ACETAMINOPHEN 1 TABLET: 7.5; 325 TABLET ORAL at 04:07

## 2024-07-23 RX ADMIN — ONDANSETRON 4 MG: 4 TABLET, ORALLY DISINTEGRATING ORAL at 04:07

## 2024-07-23 NOTE — ED PROVIDER NOTES
Encounter Date: 2024       History     Chief Complaint   Patient presents with    Abscess     Pt has abscess in her vagina     HPI    28-year-old female with no significant past medical history presents emergency department with complaint of a cyst/abscess towards her left vaginal region for the past 2-3 days.  She states symptoms are associated with nausea.  No associated vomiting, chest pain, shortness of breath, fever, chills.  Review of patient's allergies indicates:  No Known Allergies  Past Medical History:   Diagnosis Date    Anxiety     Depression     PCOS (polycystic ovarian syndrome)      Past Surgical History:   Procedure Laterality Date    BARIATRIC SURGERY  2018    Gastric sleeve     SECTION N/A 02/10/2022    Procedure:  SECTION  (PRIMARY)   (BREECH);  Surgeon: Becca Eastman MD;  Location: [facility] L&D;  Service: OB/GYN;  Laterality: N/A;     SECTION N/A 2023    Procedure:  SECTION;  Surgeon: Becca Eastman MD;  Location: [facility] L&D;  Service: OB/GYN;  Laterality: N/A;    CHOLECYSTECTOMY      LAPAROSCOPIC APPENDECTOMY N/A 2022    Procedure: APPENDECTOMY, LAPAROSCOPIC;  Surgeon: Sanjay Lopez III, MD;  Location: [facility] OR;  Service: General;  Laterality: N/A;     Family History   Problem Relation Name Age of Onset    Hypothyroidism Mother      Heart disease Father  56        ended up having CABG    Depression Brother      Mental illness Brother      Cancer Maternal Aunt  40        breast    Cancer Maternal Grandmother  60        colon     Social History     Tobacco Use    Smoking status: Never    Smokeless tobacco: Never   Substance Use Topics    Alcohol use: Not Currently    Drug use: Never     Review of Systems   Constitutional:  Negative for fever.   Skin:  Positive for color change and wound.       Physical Exam     Initial Vitals [24 0348]   BP Pulse Resp Temp SpO2   124/79 71 18 98 °F (36.7 °C) 100 %      MAP       --         Physical  Exam    Vitals reviewed.  Constitutional: She appears well-developed and well-nourished.  Non-toxic appearance.   HENT:   Head: Normocephalic and atraumatic.   Eyes: EOM are normal. Pupils are equal, round, and reactive to light.   Neck: Neck supple.   Normal range of motion.  Cardiovascular:  Normal rate and regular rhythm.           Abdominal: Abdomen is soft.   Genitourinary:    Genitourinary Comments:  examination performed with chaperone present  Patient noted to have an abscess towards her left labia majora at the 4 o'clock position concerning for Bartholin cyst involvement     Musculoskeletal:      Cervical back: Normal range of motion and neck supple.     Neurological: She is alert and oriented to person, place, and time.   Skin: Skin is warm and dry.         ED Course   I & D - Incision and Drainage    Date/Time: 7/23/2024 6:51 AM  Location procedure was performed: [facility] EMERGENCY DEPARTMENT    Performed by: Juanjo Herrera MD  Authorized by: Jono Alba MD  Consent Done: Not Needed  Type: abscess  Body area: anogenital  Location details: Bartholin's gland  Anesthesia: local infiltration    Anesthesia:  Local Anesthetic: lidocaine 1% without epinephrine    Patient sedated: no  Scalpel size: 11  Incision type: single straight  Complexity: simple  Drainage: pus and serosanguinous  Drainage amount: moderate  Wound treatment: wound packed  Packing material: 1/4 in gauze  Complications: No  Patient tolerance: Patient tolerated the procedure well with no immediate complications        Labs Reviewed - No data to display       Imaging Results    None          Medications   LIDOcaine (PF) 10 mg/ml (1%) injection 50 mg (50 mg Infiltration Given by Provider 7/23/24 5545)   ondansetron disintegrating tablet 4 mg (4 mg Oral Given 7/23/24 5664)   doxycycline capsule 100 mg (100 mg Oral Given 7/23/24 9115)     Medical Decision Making  Risk  Prescription drug management.    28-year-old female with no significant  past medical history presents emergency department with complaint of a cyst/abscess towards her left vaginal region for the past 2-3 days.  She states symptoms are associated with nausea.  No associated vomiting, chest pain, shortness of breath, fever, chills.  Initial vital signs within normal limits.  Physical examination as stated above.  Incision and drainage as per informed at bedside as stated above in procedure section.  Patient was given doxycycline and Zofran.  She was discharged to home with a prescription for doxycycline.  Fully reviewed return precautions.  Patient understood and agreed with plan of care.  Case was discussed with Dr. Alba.    Juanjo Herrera MD  PGY-4 [employer]  6:52 AM 7/23/2024                                    Clinical Impression:  Final diagnoses:  [N75.0] Bartholin cyst (Primary)  [L02.91] Abscess          ED Disposition Condition    Discharge           ED Prescriptions       Medication Sig Dispense Start Date End Date Auth. Provider    doxycycline (VIBRAMYCIN) 100 MG Cap Take 1 capsule (100 mg total) by mouth 2 (two) times daily. for 7 days 14 capsule 7/23/2024 7/30/2024 Juanjo Herrera MD          Follow-up Information       Follow up With Specialties Details Why Contact Info Additional Information    [facility] - Emergency Dept Emergency Medicine  If symptoms worsen [address] [facility] [id number] [phone] 1st floor             Juanjo Herrera MD  Resident  07/23/24 8468

## 2024-07-23 NOTE — DISCHARGE INSTRUCTIONS
Return to the emergency department if he experienced nausea, vomiting with inability to eat or drink, chest pain, shortness of breath, fever, chills that has not improve with medication.  We have discharged her with a prescription for doxycycline which he will take every 12 hours for the next 7 days.  Please follow-up with your regular doctor in regards to your presentation today.

## 2024-08-28 ENCOUNTER — OFFICE VISIT (OUTPATIENT)
Dept: URGENT CARE | Facility: CLINIC | Age: 29
End: 2024-08-28
Payer: COMMERCIAL

## 2024-08-28 VITALS
TEMPERATURE: 97 F | DIASTOLIC BLOOD PRESSURE: 70 MMHG | WEIGHT: 260 LBS | RESPIRATION RATE: 16 BRPM | SYSTOLIC BLOOD PRESSURE: 114 MMHG | HEART RATE: 82 BPM | BODY MASS INDEX: 35.26 KG/M2 | OXYGEN SATURATION: 98 %

## 2024-08-28 DIAGNOSIS — R05.1 ACUTE COUGH: ICD-10-CM

## 2024-08-28 DIAGNOSIS — J02.9 SORE THROAT: ICD-10-CM

## 2024-08-28 DIAGNOSIS — R52 BODY ACHES: ICD-10-CM

## 2024-08-28 DIAGNOSIS — J06.9 VIRAL URI WITH COUGH: Primary | ICD-10-CM

## 2024-08-28 DIAGNOSIS — Z20.822 COVID-19 VIRUS NOT DETECTED: ICD-10-CM

## 2024-08-28 LAB
CTP QC/QA: YES
FLUAV AG NPH QL: NEGATIVE
FLUBV AG NPH QL: NEGATIVE
S PYO RRNA THROAT QL PROBE: NEGATIVE
SARS-COV-2 AG RESP QL IA.RAPID: NEGATIVE

## 2024-08-28 PROCEDURE — 87811 SARS-COV-2 COVID19 W/OPTIC: CPT | Mod: QW,S$GLB,, | Performed by: NURSE PRACTITIONER

## 2024-08-28 PROCEDURE — 87804 INFLUENZA ASSAY W/OPTIC: CPT | Mod: QW,,, | Performed by: NURSE PRACTITIONER

## 2024-08-28 PROCEDURE — 99214 OFFICE O/P EST MOD 30 MIN: CPT | Mod: S$GLB,,, | Performed by: NURSE PRACTITIONER

## 2024-08-28 PROCEDURE — 87880 STREP A ASSAY W/OPTIC: CPT | Mod: QW,,, | Performed by: NURSE PRACTITIONER

## 2024-08-28 RX ORDER — GUAIFENESIN 200 MG/1
400 TABLET ORAL EVERY 4 HOURS PRN
Qty: 30 TABLET | Refills: 0 | Status: SHIPPED | OUTPATIENT
Start: 2024-08-28 | End: 2024-09-04

## 2024-08-28 RX ORDER — AZELASTINE 1 MG/ML
1 SPRAY, METERED NASAL 2 TIMES DAILY PRN
Qty: 30 ML | Refills: 0 | Status: SHIPPED | OUTPATIENT
Start: 2024-08-28 | End: 2025-08-28

## 2024-08-28 RX ORDER — BENZONATATE 100 MG/1
100 CAPSULE ORAL 3 TIMES DAILY PRN
Qty: 21 CAPSULE | Refills: 0 | Status: SHIPPED | OUTPATIENT
Start: 2024-08-28 | End: 2024-09-04

## 2024-08-28 RX ORDER — PROMETHAZINE HYDROCHLORIDE AND DEXTROMETHORPHAN HYDROBROMIDE 6.25; 15 MG/5ML; MG/5ML
5 SYRUP ORAL NIGHTLY PRN
Qty: 50 ML | Refills: 0 | Status: SHIPPED | OUTPATIENT
Start: 2024-08-28 | End: 2024-09-04

## 2024-08-28 NOTE — PATIENT INSTRUCTIONS
Increase clear fluid intake  Rest until better  Stop all current over the counter cough, cold, flu medicine  Tylenol/motrin otc for fever or pain  Use Astelin nasal spray as needed for sinus congestion and pressure.  Take Mucinex   as needed for chest congestion and coughing. Take mucinex with a full glass of water at each dose  May use Tessalon Perles as needed for excessive daytime coughing  May use promethazine DM at bedtime for excessive nighttime coughing  Add a humidifier to your room at bedtime for respiratory comfort.  Saltwater gargles 4 x daily and benzocaine anesthetic throat lozenges for sore throat. May also add honey based cough syrup  Follow up with PCP  Go immediately to the nearest emergency room for shortness of breath, chest pain,  or other emergent concern.  Return to clinic for new, worse, or unresolving symptoms

## 2024-08-28 NOTE — PROGRESS NOTES
Subjective:      Patient ID: Rina Hearn is a 28 y.o. female.    Vitals:  weight is 117.9 kg (260 lb). Her oral temperature is 97 °F (36.1 °C). Her blood pressure is 114/70 and her pulse is 82. Her respiration is 16 and oxygen saturation is 98%.     Chief Complaint: Generalized Body Aches    Patient states she has been experiencing extreme fatigue, HA, nasal congestion, sore throat, and body aches x 2 days. Patient states she has also been exposed to Covid by her mother-n-law who tested positive last Friday. Patient denies fever. States she has been taking Ibuprofen and Tylenol.       Constitution: Positive for sweating.   HENT:  Positive for congestion and sore throat. Negative for ear pain and trouble swallowing.    Cardiovascular:  Negative for chest pain, palpitations and sob on exertion.   Respiratory:  Positive for cough. Negative for sputum production, shortness of breath and wheezing.    Gastrointestinal:  Negative for abdominal pain, nausea, vomiting and diarrhea.   Musculoskeletal:  Positive for muscle ache.   Skin:  Negative for rash.   Neurological:  Negative for dizziness, light-headedness, passing out, disorientation and altered mental status.   Psychiatric/Behavioral:  Negative for altered mental status, disorientation and confusion.       Objective:     Physical Exam   Constitutional: She is oriented to person, place, and time. She appears well-developed. She is cooperative.  Non-toxic appearance. She does not appear ill. No distress.   HENT:   Head: Normocephalic and atraumatic.   Ears:   Right Ear: Hearing and external ear normal.   Left Ear: Hearing and external ear normal.   Nose: Rhinorrhea and congestion present. No mucosal edema, purulent discharge or nasal deformity. No epistaxis. Right sinus exhibits no maxillary sinus tenderness and no frontal sinus tenderness. Left sinus exhibits no maxillary sinus tenderness and no frontal sinus tenderness.   Mouth/Throat: Uvula is midline, oropharynx  is clear and moist and mucous membranes are normal. Mucous membranes are moist. No trismus in the jaw. Normal dentition. No uvula swelling. No oropharyngeal exudate, posterior oropharyngeal edema, posterior oropharyngeal erythema, tonsillar abscesses or cobblestoning. Tonsils are 1+ on the right. Tonsils are 1+ on the left. No tonsillar exudate.   Eyes: Conjunctivae and lids are normal. No scleral icterus.   Neck: Trachea normal and phonation normal. Neck supple. No edema present. No erythema present. No neck rigidity present.   Cardiovascular: Normal rate, regular rhythm, normal heart sounds and normal pulses.   Pulmonary/Chest: Effort normal and breath sounds normal. No stridor. No respiratory distress. She has no decreased breath sounds. She has no rhonchi.   Abdominal: Normal appearance.   Musculoskeletal: Normal range of motion.         General: No deformity. Normal range of motion.   Lymphadenopathy:     She has no cervical adenopathy.   Neurological: no focal deficit. She is alert and oriented to person, place, and time. She exhibits normal muscle tone. Coordination normal.   Skin: Skin is warm, dry, intact, not diaphoretic and not pale. Capillary refill takes 2 to 3 seconds.   Psychiatric: Her speech is normal and behavior is normal. Judgment and thought content normal.   Nursing note and vitals reviewed.      Assessment:     1. Viral URI with cough    2. Acute cough    3. Sore throat    4. Body aches    5. COVID-19 virus not detected        Plan:       Viral URI with cough  -     azelastine (ASTELIN) 137 mcg (0.1 %) nasal spray; 1 spray (137 mcg total) by Nasal route 2 (two) times daily as needed for Rhinitis.  Dispense: 30 mL; Refill: 0  -     benzocaine-menthoL 6-10 mg lozenge; Take 1 lozenge by mouth every 2 (two) hours as needed (Sore Throat).  Dispense: 18 tablet; Refill: 0  -     guaiFENesin 200 mg tablet; Take 2 tablets (400 mg total) by mouth every 4 (four) hours as needed for Congestion.  Dispense:  30 tablet; Refill: 0  -     benzonatate (TESSALON) 100 MG capsule; Take 1 capsule (100 mg total) by mouth 3 (three) times daily as needed for Cough.  Dispense: 21 capsule; Refill: 0  -     promethazine-dextromethorphan (PROMETHAZINE-DM) 6.25-15 mg/5 mL Syrp; Take 5 mLs by mouth nightly as needed (cough).  Dispense: 50 mL; Refill: 0    Acute cough  -     SARS Coronavirus 2 Antigen, POCT Manual Read  -     POCT rapid strep A  -     POCT Influenza A/B    Sore throat  -     SARS Coronavirus 2 Antigen, POCT Manual Read  -     POCT rapid strep A  -     POCT Influenza A/B  -     benzocaine-menthoL 6-10 mg lozenge; Take 1 lozenge by mouth every 2 (two) hours as needed (Sore Throat).  Dispense: 18 tablet; Refill: 0    Body aches  -     SARS Coronavirus 2 Antigen, POCT Manual Read  -     POCT rapid strep A  -     POCT Influenza A/B    COVID-19 virus not detected      Flu negative  Strep negative    The test results and physical exam findings were discussed with the patient and all questions answered. We discussed symptom monitoring, conservative care methods, medication use, and follow up orders. she verbalized understanding and agreement with the plan of care.

## 2024-08-28 NOTE — LETTER
August 28, 2024      [facility]  [address] [facility] [address]  Phone: [phone]       Patient: Rina Hearn   YOB: 1995  Date of Visit: 08/28/2024    To Whom It May Concern:    Asa Hearn  was at [facility] on 08/28/2024. The patient may return to work/school on August 30, 2024 with no restrictions. If you have any questions or concerns, or if I can be of further assistance, please do not hesitate to contact me.    Sincerely,    Gregg Al Jr., FNP-C

## 2024-11-10 ENCOUNTER — HOSPITAL ENCOUNTER (EMERGENCY)
Facility: HOSPITAL | Age: 29
Discharge: HOME | End: 2024-11-10
Attending: EMERGENCY MEDICINE
Payer: COMMERCIAL

## 2024-11-10 VITALS
HEART RATE: 80 BPM | DIASTOLIC BLOOD PRESSURE: 80 MMHG | TEMPERATURE: 99 F | HEIGHT: 72 IN | WEIGHT: 260 LBS | BODY MASS INDEX: 35.21 KG/M2 | RESPIRATION RATE: 16 BRPM | SYSTOLIC BLOOD PRESSURE: 124 MMHG | OXYGEN SATURATION: 99 %

## 2024-11-10 DIAGNOSIS — L02.91 ABSCESS: Primary | ICD-10-CM

## 2024-11-10 LAB
B-HCG UR QL: NEGATIVE
CTP QC/QA: YES

## 2024-11-10 PROCEDURE — 63600175 PHARM REV CODE 636 W HCPCS: Performed by: EMERGENCY MEDICINE

## 2024-11-10 PROCEDURE — 87070 CULTURE OTHR SPECIMN AEROBIC: CPT | Performed by: EMERGENCY MEDICINE

## 2024-11-10 PROCEDURE — 87077 CULTURE AEROBIC IDENTIFY: CPT | Performed by: EMERGENCY MEDICINE

## 2024-11-10 PROCEDURE — 87186 SC STD MICRODIL/AGAR DIL: CPT | Performed by: EMERGENCY MEDICINE

## 2024-11-10 PROCEDURE — 10060 I&D ABSCESS SIMPLE/SINGLE: CPT

## 2024-11-10 PROCEDURE — 87147 CULTURE TYPE IMMUNOLOGIC: CPT | Performed by: EMERGENCY MEDICINE

## 2024-11-10 PROCEDURE — 81025 URINE PREGNANCY TEST: CPT | Performed by: EMERGENCY MEDICINE

## 2024-11-10 PROCEDURE — 99284 EMERGENCY DEPT VISIT MOD MDM: CPT | Mod: 25

## 2024-11-10 RX ORDER — DOXYCYCLINE 100 MG/1
100 CAPSULE ORAL 2 TIMES DAILY
Qty: 20 CAPSULE | Refills: 0 | OUTPATIENT
Start: 2024-11-10 | End: 2024-11-11

## 2024-11-10 RX ORDER — MUPIROCIN 20 MG/G
OINTMENT TOPICAL 3 TIMES DAILY
Qty: 22 G | Refills: 2 | Status: SHIPPED | OUTPATIENT
Start: 2024-11-10

## 2024-11-10 RX ORDER — LIDOCAINE HYDROCHLORIDE 10 MG/ML
5 INJECTION, SOLUTION EPIDURAL; INFILTRATION; INTRACAUDAL; PERINEURAL
Status: COMPLETED | OUTPATIENT
Start: 2024-11-10 | End: 2024-11-10

## 2024-11-10 RX ADMIN — LIDOCAINE HYDROCHLORIDE 50 MG: 10 INJECTION, SOLUTION EPIDURAL; INFILTRATION; INTRACAUDAL; PERINEURAL at 11:11

## 2024-11-10 NOTE — ED PROVIDER NOTES
Encounter Date: 11/10/2024       History     Chief Complaint   Patient presents with    Abscess     Patient had R buttock abscess in October and was treated with Cipro 10/25/24.  Patient how has 2 spots now forming on L thigh, one on R knee, one on abdomen.      Patient with a history of MRSA abscess.  Patient reports several red areas concerning for staph infection and left upper thigh with possible abscess.  No fever chills.      Review of patient's allergies indicates:  No Known Allergies  Past Medical History:   Diagnosis Date    Anxiety     Depression     PCOS (polycystic ovarian syndrome)      Past Surgical History:   Procedure Laterality Date    BARIATRIC SURGERY  2018    Gastric sleeve     SECTION N/A 02/10/2022    Procedure:  SECTION  (PRIMARY)   (BREECH);  Surgeon: Becca Eastman MD;  Location: [facility] L&D;  Service: OB/GYN;  Laterality: N/A;     SECTION N/A 2023    Procedure:  SECTION;  Surgeon: Becca Eastman MD;  Location: [facility] L&D;  Service: OB/GYN;  Laterality: N/A;    CHOLECYSTECTOMY      LAPAROSCOPIC APPENDECTOMY N/A 2022    Procedure: APPENDECTOMY, LAPAROSCOPIC;  Surgeon: Sanjay Lopez III, MD;  Location: [facility] OR;  Service: General;  Laterality: N/A;     Family History   Problem Relation Name Age of Onset    Hypothyroidism Mother      Heart disease Father  56        ended up having CABG    Depression Brother      Mental illness Brother      Cancer Maternal Aunt  40        breast    Cancer Maternal Grandmother  60        colon     Social History     Tobacco Use    Smoking status: Never    Smokeless tobacco: Never   Substance Use Topics    Alcohol use: Not Currently    Drug use: Never     Review of Systems   Constitutional:  Negative for chills and fever.   HENT:  Negative for congestion.    Gastrointestinal:  Negative for abdominal pain and vomiting.   Musculoskeletal:  Negative for joint swelling.   Neurological:  Negative for headaches.    Psychiatric/Behavioral:  Negative for confusion.        Physical Exam     Initial Vitals [11/10/24 1049]   BP Pulse Resp Temp SpO2   124/80 80 16 98.9 °F (37.2 °C) 99 %      MAP       --         Physical Exam    Nursing note and vitals reviewed.  Constitutional: She is not diaphoretic. No distress.   HENT:   Head: Normocephalic and atraumatic.   Eyes: Conjunctivae are normal.   Neck:   Normal range of motion.  Musculoskeletal:         General: Normal range of motion.      Cervical back: Normal range of motion.     Neurological: She is alert.   No gross deficits   Skin: No rash noted.   1 x 2 cm abscess to left mid inner thigh.  Multiple areas of folliculitis to right upper leg and left abdominal area.  No other surrounding induration except left thigh.   Psychiatric: She has a normal mood and affect.         ED Course   I & D - Incision and Drainage    Date/Time: 11/10/2024 12:08 PM  Location procedure was performed: [facility] EMERGENCY DEPARTMENT    Performed by: Curtis Daley MD  Authorized by: Curtis Daley MD  Type: abscess  Body area: lower extremity  Location details: left leg  Anesthesia: local infiltration    Anesthesia:  Local Anesthetic: lidocaine 1% without epinephrine  Anesthetic total: 2 mL  Scalpel size: 11  Incision type: single straight  Complexity: simple  Drainage: bloody  Drainage amount: scant  Wound treatment: deloculation  Packing material: 1/4 in gauze  Patient tolerance: Patient tolerated the procedure well with no immediate complications        Labs Reviewed   CULTURE, AEROBIC  (SPECIFY SOURCE)   POCT URINE PREGNANCY       Result Value    POC Preg Test, Ur Negative       Acceptable Yes            Imaging Results    None          Medications   LIDOcaine (PF) 10 mg/ml (1%) injection 50 mg (50 mg Infiltration Given by Provider 11/10/24 1100)     Medical Decision Making  Patient presents with abscess and other areas superficial staph infection.  UPT negative.  Wound  culture collected.  I&D done.  Will discharge with doxycycline and Bactroban.    Amount and/or Complexity of Data Reviewed  Labs: ordered. Decision-making details documented in ED Course.    Risk  Prescription drug management.                                      Clinical Impression:  Final diagnoses:  [L02.91] Abscess (Primary)          ED Disposition Condition    Discharge Stable          ED Prescriptions       Medication Sig Dispense Start Date End Date Auth. Provider    doxycycline (VIBRAMYCIN) 100 MG Cap Take 1 capsule (100 mg total) by mouth 2 (two) times daily. for 10 days 20 capsule 11/10/2024 11/20/2024 Curtis Daley MD    mupirocin (BACTROBAN) 2 % ointment Apply topically 3 (three) times daily. 22 g 11/10/2024 -- Curtis Daley MD          Follow-up Information       Follow up With Specialties Details Why Contact Info Additional Information    [facility] - Emergency Dept Emergency Medicine  If symptoms worsen [address] [facility] [address] [facility] [address]  [phone] 1st floor    Lyubov Cazares MD Internal Medicine Schedule an appointment as soon as possible for a visit in 2 days  [address] [facility]  [address]  [facility] [address]  [phone]                Curtis Daley MD  11/10/24 1210       Curtis Daley MD  11/10/24 1211

## 2024-11-11 ENCOUNTER — HOSPITAL ENCOUNTER (EMERGENCY)
Facility: HOSPITAL | Age: 29
Discharge: HOME | End: 2024-11-11
Attending: EMERGENCY MEDICINE
Payer: COMMERCIAL

## 2024-11-11 VITALS
BODY MASS INDEX: 35.21 KG/M2 | WEIGHT: 260 LBS | HEIGHT: 72 IN | DIASTOLIC BLOOD PRESSURE: 55 MMHG | RESPIRATION RATE: 16 BRPM | TEMPERATURE: 98 F | HEART RATE: 58 BPM | SYSTOLIC BLOOD PRESSURE: 102 MMHG | OXYGEN SATURATION: 99 %

## 2024-11-11 DIAGNOSIS — L02.416 ABSCESS OF LEFT THIGH: ICD-10-CM

## 2024-11-11 DIAGNOSIS — L03.116 CELLULITIS OF LEFT LOWER EXTREMITY: Primary | ICD-10-CM

## 2024-11-11 LAB
ALBUMIN SERPL BCP-MCNC: 4.4 G/DL (ref 3.5–5.2)
ALP SERPL-CCNC: 78 U/L (ref 55–135)
ALT SERPL W/O P-5'-P-CCNC: 11 U/L (ref 10–44)
ANION GAP SERPL CALC-SCNC: 5 MMOL/L (ref 8–16)
AST SERPL-CCNC: 10 U/L (ref 10–40)
BASOPHILS # BLD AUTO: 0.02 K/UL (ref 0–0.2)
BASOPHILS NFR BLD: 0.3 % (ref 0–1.9)
BILIRUB SERPL-MCNC: 0.9 MG/DL (ref 0.1–1)
BUN SERPL-MCNC: 9 MG/DL (ref 6–20)
CALCIUM SERPL-MCNC: 9.3 MG/DL (ref 8.7–10.5)
CHLORIDE SERPL-SCNC: 109 MMOL/L (ref 95–110)
CO2 SERPL-SCNC: 26 MMOL/L (ref 23–29)
CREAT SERPL-MCNC: 0.7 MG/DL (ref 0.5–1.4)
CRP SERPL-MCNC: 0.9 MG/DL
DIFFERENTIAL METHOD BLD: ABNORMAL
EOSINOPHIL # BLD AUTO: 0.1 K/UL (ref 0–0.5)
EOSINOPHIL NFR BLD: 0.9 % (ref 0–8)
ERYTHROCYTE [DISTWIDTH] IN BLOOD BY AUTOMATED COUNT: 15.7 % (ref 11.5–14.5)
ERYTHROCYTE [SEDIMENTATION RATE] IN BLOOD BY WESTERGREN METHOD: 6 MM/HR (ref 0–20)
EST. GFR  (NO RACE VARIABLE): >60 ML/MIN/1.73 M^2
GLUCOSE SERPL-MCNC: 100 MG/DL (ref 70–110)
HCT VFR BLD AUTO: 37.5 % (ref 37–48.5)
HGB BLD-MCNC: 11.7 G/DL (ref 12–16)
IMM GRANULOCYTES # BLD AUTO: 0.02 K/UL (ref 0–0.04)
IMM GRANULOCYTES NFR BLD AUTO: 0.3 % (ref 0–0.5)
LACTATE SERPL-SCNC: 1.3 MMOL/L (ref 0.5–1.9)
LYMPHOCYTES # BLD AUTO: 1.2 K/UL (ref 1–4.8)
LYMPHOCYTES NFR BLD: 16.5 % (ref 18–48)
MCH RBC QN AUTO: 25.2 PG (ref 27–31)
MCHC RBC AUTO-ENTMCNC: 31.2 G/DL (ref 32–36)
MCV RBC AUTO: 81 FL (ref 82–98)
MONOCYTES # BLD AUTO: 0.5 K/UL (ref 0.3–1)
MONOCYTES NFR BLD: 7.2 % (ref 4–15)
NEUTROPHILS # BLD AUTO: 5.6 K/UL (ref 1.8–7.7)
NEUTROPHILS NFR BLD: 74.8 % (ref 38–73)
NRBC BLD-RTO: 0 /100 WBC
PLATELET # BLD AUTO: 229 K/UL (ref 150–450)
PMV BLD AUTO: 11 FL (ref 9.2–12.9)
POTASSIUM SERPL-SCNC: 3.6 MMOL/L (ref 3.5–5.1)
PROT SERPL-MCNC: 7.2 G/DL (ref 6–8.4)
RBC # BLD AUTO: 4.64 M/UL (ref 4–5.4)
SODIUM SERPL-SCNC: 140 MMOL/L (ref 136–145)
WBC # BLD AUTO: 7.47 K/UL (ref 3.9–12.7)

## 2024-11-11 PROCEDURE — 63600175 PHARM REV CODE 636 W HCPCS

## 2024-11-11 PROCEDURE — 87040 BLOOD CULTURE FOR BACTERIA: CPT | Mod: 59

## 2024-11-11 PROCEDURE — 86140 C-REACTIVE PROTEIN: CPT

## 2024-11-11 PROCEDURE — 85651 RBC SED RATE NONAUTOMATED: CPT

## 2024-11-11 PROCEDURE — 25000003 PHARM REV CODE 250

## 2024-11-11 PROCEDURE — 36415 COLL VENOUS BLD VENIPUNCTURE: CPT

## 2024-11-11 PROCEDURE — 99285 EMERGENCY DEPT VISIT HI MDM: CPT | Mod: 25

## 2024-11-11 PROCEDURE — 96376 TX/PRO/DX INJ SAME DRUG ADON: CPT

## 2024-11-11 PROCEDURE — 83605 ASSAY OF LACTIC ACID: CPT

## 2024-11-11 PROCEDURE — 96375 TX/PRO/DX INJ NEW DRUG ADDON: CPT

## 2024-11-11 PROCEDURE — 80053 COMPREHEN METABOLIC PANEL: CPT

## 2024-11-11 PROCEDURE — 85025 COMPLETE CBC W/AUTO DIFF WBC: CPT

## 2024-11-11 PROCEDURE — 25500020 PHARM REV CODE 255

## 2024-11-11 PROCEDURE — 96365 THER/PROPH/DIAG IV INF INIT: CPT

## 2024-11-11 RX ORDER — CLINDAMYCIN PHOSPHATE 600 MG/50ML
600 INJECTION, SOLUTION INTRAVENOUS
Status: COMPLETED | OUTPATIENT
Start: 2024-11-11 | End: 2024-11-11

## 2024-11-11 RX ORDER — CLINDAMYCIN HYDROCHLORIDE 150 MG/1
450 CAPSULE ORAL EVERY 8 HOURS
Qty: 63 CAPSULE | Refills: 0 | Status: SHIPPED | OUTPATIENT
Start: 2024-11-11 | End: 2024-11-18

## 2024-11-11 RX ORDER — HYDROCODONE BITARTRATE AND ACETAMINOPHEN 5; 325 MG/1; MG/1
1 TABLET ORAL EVERY 6 HOURS PRN
Qty: 12 TABLET | Refills: 0 | Status: SHIPPED | OUTPATIENT
Start: 2024-11-11

## 2024-11-11 RX ORDER — IBUPROFEN 600 MG/1
600 TABLET ORAL EVERY 6 HOURS PRN
Qty: 20 TABLET | Refills: 0 | Status: SHIPPED | OUTPATIENT
Start: 2024-11-11

## 2024-11-11 RX ORDER — MORPHINE SULFATE 4 MG/ML
4 INJECTION, SOLUTION INTRAMUSCULAR; INTRAVENOUS
Status: COMPLETED | OUTPATIENT
Start: 2024-11-11 | End: 2024-11-11

## 2024-11-11 RX ORDER — HYDROXYZINE PAMOATE 25 MG/1
25 CAPSULE ORAL
Status: COMPLETED | OUTPATIENT
Start: 2024-11-11 | End: 2024-11-11

## 2024-11-11 RX ADMIN — IOHEXOL 100 ML: 350 INJECTION, SOLUTION INTRAVENOUS at 01:11

## 2024-11-11 RX ADMIN — HYDROXYZINE PAMOATE 25 MG: 25 CAPSULE ORAL at 02:11

## 2024-11-11 RX ADMIN — CLINDAMYCIN PHOSPHATE 600 MG: 600 INJECTION, SOLUTION INTRAVENOUS at 12:11

## 2024-11-11 RX ADMIN — MORPHINE SULFATE 4 MG: 4 INJECTION INTRAVENOUS at 12:11

## 2024-11-11 RX ADMIN — MORPHINE SULFATE 4 MG: 4 INJECTION INTRAVENOUS at 02:11

## 2024-11-11 NOTE — DISCHARGE INSTRUCTIONS

## 2024-11-11 NOTE — ED PROVIDER NOTES
Encounter Date: 2024       History     Chief Complaint   Patient presents with    Abscess     Pt had abscess lanced yesterday, states it will not quit bleeding and its painful. Pt states she had to call off work due to pain.      29-year-old female with a past medical history of anxiety, depression, and PCOS presents to ED for an abscess.  Patient states she came here yesterday and had a left thigh abscess incision and drainage performed.  Patient states since then the pain has worsened.  Patient complaining of a throbbing stabbing constant 8/10 pain.  No medications prior to arrival.  Movement and walking makes it worse.  Patient states she was discharged with doxycycline and Bactroban.  Patient states she took 1 dose of the doxycycline.  Of note patient has been on Cipro due to an abscess on her right glute from October by her PCP.  She also admits to swelling, bleeding, chills, sweats, and nausea.  Patient denies fever, abdominal pain, vomiting, numbness, tingling, and purulent discharge.      Review of patient's allergies indicates:  No Known Allergies  Past Medical History:   Diagnosis Date    Anxiety     Depression     PCOS (polycystic ovarian syndrome)      Past Surgical History:   Procedure Laterality Date    BARIATRIC SURGERY  2018    Gastric sleeve     SECTION N/A 02/10/2022    Procedure:  SECTION  (PRIMARY)   (BREECH);  Surgeon: Becca Eastman MD;  Location: [facility] L&D;  Service: OB/GYN;  Laterality: N/A;     SECTION N/A 2023    Procedure:  SECTION;  Surgeon: eBcca Eastman MD;  Location: [facility] L&D;  Service: OB/GYN;  Laterality: N/A;    CHOLECYSTECTOMY  2019    LAPAROSCOPIC APPENDECTOMY N/A 2022    Procedure: APPENDECTOMY, LAPAROSCOPIC;  Surgeon: Sanjay Lopez III, MD;  Location: [facility] OR;  Service: General;  Laterality: N/A;     Family History   Problem Relation Name Age of Onset    Hypothyroidism Mother      Heart disease Father  56        ended up  having CABG    Depression Brother      Mental illness Brother      Cancer Maternal Aunt  40        breast    Cancer Maternal Grandmother  60        colon     Social History     Tobacco Use    Smoking status: Never    Smokeless tobacco: Never   Substance Use Topics    Alcohol use: Not Currently    Drug use: Never     Review of Systems   Constitutional:  Positive for chills and diaphoresis. Negative for fever.   Gastrointestinal:  Positive for nausea. Negative for vomiting.   Musculoskeletal:  Positive for myalgias (left thigh).   Skin:         (+) left thigh abscess with bleeding; no purulent drainage     Neurological:  Negative for weakness and numbness.        (-) paresthesia       Physical Exam     Initial Vitals   BP Pulse Resp Temp SpO2   11/11/24 1054 11/11/24 1054 11/11/24 1055 11/11/24 1054 11/11/24 1054   (!) 141/82 91 16 98.2 °F (36.8 °C) 100 %      MAP       --                Physical Exam    Nursing note and vitals reviewed.  Constitutional: She appears well-developed and well-nourished. She is not diaphoretic. She is active. She does not appear ill. No distress.   HENT:   Head: Normocephalic and atraumatic.   Right Ear: External ear normal.   Left Ear: External ear normal.   Nose: Nose normal.   Eyes: Conjunctivae, EOM and lids are normal. Pupils are equal, round, and reactive to light. Right eye exhibits no discharge. Left eye exhibits no discharge.   Neck: Phonation normal. Neck supple.   Normal range of motion.   Full passive range of motion without pain.     Pulmonary/Chest: Effort normal. No respiratory distress.   Abdominal: She exhibits no distension.   Musculoskeletal:         General: Normal range of motion.      Cervical back: Full passive range of motion without pain, normal range of motion and neck supple.      Left upper leg: Swelling, edema and tenderness (medial) present. No deformity, lacerations or bony tenderness.        Legs:       Comments: 4 cm abscess of left medial thigh with  packing in place; no active purulent or bloody drainage.  Swelling, erythema, and edema surrounding area with significant tenderness to palpation.     Neurological: She is alert and oriented to person, place, and time. She has normal strength. No sensory deficit. GCS eye subscore is 4. GCS verbal subscore is 5. GCS motor subscore is 6.   Skin: Skin is dry. Capillary refill takes less than 2 seconds.         ED Course   Procedures  Labs Reviewed   CBC W/ AUTO DIFFERENTIAL - Abnormal       Result Value    WBC 7.47      RBC 4.64      Hemoglobin 11.7 (*)     Hematocrit 37.5      MCV 81 (*)     MCH 25.2 (*)     MCHC 31.2 (*)     RDW 15.7 (*)     Platelets 229      MPV 11.0      Immature Granulocytes 0.3      Gran # (ANC) 5.6      Immature Grans (Abs) 0.02      Lymph # 1.2      Mono # 0.5      Eos # 0.1      Baso # 0.02      nRBC 0      Gran % 74.8 (*)     Lymph % 16.5 (*)     Mono % 7.2      Eosinophil % 0.9      Basophil % 0.3      Differential Method Automated     COMPREHENSIVE METABOLIC PANEL - Abnormal    Sodium 140      Potassium 3.6      Chloride 109      CO2 26      Glucose 100      BUN 9      Creatinine 0.7      Calcium 9.3      Total Protein 7.2      Albumin 4.4      Total Bilirubin 0.9      Alkaline Phosphatase 78      AST 10      ALT 11      eGFR >60.0      Anion Gap 5 (*)    CULTURE, BLOOD   CULTURE, BLOOD   C-REACTIVE PROTEIN    CRP 0.90     SEDIMENTATION RATE    Sed Rate 6     LACTIC ACID, PLASMA    Lactate (Lactic Acid) 1.3            Imaging Results              CT Thigh With Contrast Left (Final result)  Result time 11/11/24 14:12:35      Final result by Rogelio Cordero MD (11/11/24 14:12:35)                   Impression:      1.  Skin thickening and subcutaneous edema along the medial left thigh compatible with cellulitis.    2.  Linear subcutaneous emphysema in keeping with a history of recent instrumentation (incision and drainage), with no focal drainable abscess identified.    3.  Small linear  innominate vessel adjacent to the incision, with no gross active contrast extravasation or measurable hematoma identified.  Consider correlation with physical exam and history.      Electronically signed by: Rogelio Cordero  Date:    11/11/2024  Time:    14:12               Narrative:    CLINICAL HISTORY:  ([id number])28 y/o  (1995) F    Soft tissue mass, thigh, deep;  Abscess (Pt had abscess lanced yesterday, states it will not quit bleeding and its painful. Pt states she had to call off work due to pain. )    TECHNIQUE:  (A#[id number], exam time 11/11/2024 14:02)    CT THIGH WITH CONTRAST LEFT [id number]    Axial images of the left thigh were obtained with bone and soft tissue algorithms. Sagittal and coronal reformats were obtained and submitted for review.    CONTRAST: 100 mL of Omni 350 was administered uneventfully    CMS MANDATED QUALITY DATA - CT RADIATION - 436    All CT scans at this facility utilize dose modulation, iterative reconstruction, and/or weight based dosing when appropriate to reduce radiation dose to as low as reasonably achievable.    COMPARISON:  None available.    FINDINGS:  Osseous: Anatomic alignment.No fracture or dislocation.  No focal osseous erosion, lucency or periosteal reaction is seen to suggest osteomyelitis.    Joints: The hip and knee joint are maintained.  No knee joint effusion is identified.    Soft tissues: Focal skin thickening and adjacent subcutaneous soft tissue edema is seen along the medial aspect of the left thigh (image 291) there are scattered punctate foci of emphysema (image 292), likely related to recent instrumentation (noting a gas-forming infection is not entirely excluded).  No focal drainable abscess is identified.  There is a small adjacent linear vessel, with no gross active contrast extravasation identified (image 289) consider correlation with physical exam.    Vasculature: The common femoral, SFA, profundus femoris, popliteal and major geniculate  vessels appear patent with no active contrast extravasation, dissection, hematoma or aneurysm identified.    Pelvic soft tissues: Partially imaged is a T-shaped IUD projecting to the myometrium.  There is trace free fluid the pelvic cul-de-sac, likely physiologic in this age group.                                       Medications   morphine injection 4 mg (4 mg Intravenous Given 11/11/24 1201)   clindamycin in D5W 600 mg/50 mL IVPB 600 mg (0 mg Intravenous Stopped 11/11/24 1241)   hydrOXYzine pamoate capsule 25 mg (25 mg Oral Given 11/11/24 1418)   iohexoL (OMNIPAQUE 350) injection 100 mL (100 mLs Intravenous Given 11/11/24 1329)   morphine injection 4 mg (4 mg Intravenous Given 11/11/24 1417)     Medical Decision Making  29-year-old female with a past medical history of anxiety, depression, and PCOS presents to ED for an abscess.  Patient's chart and medical history reviewed.    Ddx:  Abscess  Cellulitis  Deep space abscess  Sepsis    Patient's vitals reviewed.  Afebrile, no respiratory distress, and nontoxic-appearing in the ED. Patient had 4 cm abscess of left medial thigh with packing in place; no active purulent or bloody drainage.  Swelling, erythema, and edema surrounding area with significant tenderness to palpation.  With shared decision-making we will get labs and imaging today.  UPT was negative.  Patient given morphine for pain.  Patient given a dose of clindamycin in the ED.  Blood cultures pending.  Lactic acid in normal range at 1.3.  Sed rate and CRP in normal range.  CMP unremarkable.  CBC overall unremarkable.  Patient now complaining of anxiety per nursing staff.  Patient given a dose of hydroxyzine in the ED. Patient is still complaining of pain after CT.  Patient given a dose of dose of morphine.  CT showed Skin thickening and subcutaneous edema along the medial left thigh compatible with cellulitis.  Linear subcutaneous emphysema in keeping with a history of recent instrumentation (incision  and drainage), with no focal drainable abscess identified.  Small linear innominate vessel adjacent to the incision, with no gross active contrast extravasation or measurable hematoma identified.  Consider correlation with physical exam and history.  Discussed all results with patient, she verbalized understanding.  We will change patient's doxycycline to clindamycin.  Patient will also be sent home with short course of Norco and naproxen for pain control. I have reviewed the  for this patient.  Instructed patient to keep the area clean and dry and watch out for signs of worsening infection including erythema, warmth, pus discharge, and fevers at home.  Discussed with patient to rest, stay well hydrated, and do warm compresses, she verbalized understanding.  Patient will follow-up with her PCP. Patient agrees with this plan. Discussed with her strict return precautions, she verbalized understanding. Patient is stable for discharge.     Amount and/or Complexity of Data Reviewed  External Data Reviewed: labs, radiology and notes.  Labs: ordered.  Radiology: ordered.    Risk  Prescription drug management.                                      Clinical Impression:  Final diagnoses:  [L03.116] Cellulitis of left lower extremity (Primary)  [L02.416] Abscess of left thigh          ED Disposition Condition    Discharge Stable          ED Prescriptions       Medication Sig Dispense Start Date End Date Auth. Provider    clindamycin (CLEOCIN) 150 MG capsule Take 3 capsules (450 mg total) by mouth every 8 (eight) hours. for 7 days 63 capsule 11/11/2024 11/18/2024 Holdsworth, Alayna, PA-C    ibuprofen (ADVIL,MOTRIN) 600 MG tablet Take 1 tablet (600 mg total) by mouth every 6 (six) hours as needed for Pain or Temperature greater than. 20 tablet 11/11/2024 -- Holdsworth, Alayna, PA-C    HYDROcodone-acetaminophen (NORCO) 5-325 mg per tablet Take 1 tablet by mouth every 6 (six) hours as needed for Pain. 12 tablet 11/11/2024 --  Holdsworth, Alayna, PA-C          Follow-up Information       Follow up With Specialties Details Why Contact Info    Lyubov Cazares MD Internal Medicine Call   [address] [facility] [address]  [place] [address]  [phone]               Holdsworth, Alayna, PA-C  11/11/24 2188

## 2024-11-11 NOTE — Clinical Note
"Rina Vegakane Hearn was seen and treated in our emergency department on 11/11/2024.  She may return to work on 11/12/2024.  Pt was seen at [facility] ED 11/10/24 and 11/11/24, please excuse patient from work.     If you have any questions or concerns, please don't hesitate to call.       RN    "

## 2024-11-12 LAB — BACTERIA SPEC AEROBE CULT: ABNORMAL

## 2024-11-12 NOTE — ED NOTES
Pt called back to ED.  Spoke to pt and informed her of positive MRSA and to  antibiotics fro pharmacy and take as directed

## 2024-11-12 NOTE — ED NOTES
Spoke to Dr Erwin.  Dr Erwin to call Doxycycline to pt pharmacy.  Attempted to call pt to inform of positive MRSA.  Left voicemail to call ED charge nurse

## 2024-11-16 LAB
BACTERIA BLD CULT: NORMAL
BACTERIA BLD CULT: NORMAL

## (undated) DEVICE — CUTTER LINEAR FLEX ARTICNG 45MM ATS45

## (undated) DEVICE — RELOAD LINEAR 6R45B

## (undated) DEVICE — PAD BOVIE ADULT

## (undated) DEVICE — SYRINGE HYPODERMC LL 22G 1.5 ECLIPSE 30

## (undated) DEVICE — BINDER 12 4-PANEL 30-45

## (undated) DEVICE — DRESSING POST OP MEPILEX  AG  4X12

## (undated) DEVICE — AGENT HEMOSTATIC ARISTA 3GR

## (undated) DEVICE — TROCAR OPTICAL ZTHREAD 5MMX100MM CTF03

## (undated) DEVICE — POUCH RETRIEVAL 10MM APP. MED.     CD001

## (undated) DEVICE — TROCAR OPTICAL ZTHREAD 12MMX100MM CTF73

## (undated) DEVICE — DERMABOND HVD MINI  DHVM12

## (undated) DEVICE — SLEEVE TROCAR 5MMX100MM  CTS02

## (undated) DEVICE — SUTURE MONOCRYL 4-0 PS-2 27 MCP426H

## (undated) DEVICE — GLOVE BIOGEL MICRO SURGEON PINK SZ 7.5

## (undated) DEVICE — TRAY GENERAL LAPAROSCOPY

## (undated) DEVICE — Device

## (undated) DEVICE — TROCAR BALL. BLNT HASSON C0R47

## (undated) DEVICE — RETRIEVER ENDOPOUCH SPEC BAG

## (undated) DEVICE — SOLUTION IRRI H2O BOTTLE 1000ML

## (undated) DEVICE — BINDER 12 4-PANEL 45-62

## (undated) DEVICE — SOLUTION IRRI NS BOTTLE 1000ML R5200-01

## (undated) DEVICE — SUTURE ETHIBOND 0 MO-6 18 CX45D

## (undated) DEVICE — DISSECTOR KITTNER 13300

## (undated) DEVICE — UNDERGLOVE BIOGEL PI MICRO BLUE SZ 8

## (undated) DEVICE — LIGASURE MARYLAND LF1937 REPLACES LF1737